# Patient Record
Sex: MALE | Race: BLACK OR AFRICAN AMERICAN | Employment: FULL TIME | URBAN - METROPOLITAN AREA
[De-identification: names, ages, dates, MRNs, and addresses within clinical notes are randomized per-mention and may not be internally consistent; named-entity substitution may affect disease eponyms.]

---

## 2017-07-07 ENCOUNTER — OFFICE VISIT (OUTPATIENT)
Dept: ORTHOPEDIC SURGERY | Age: 52
End: 2017-07-07

## 2017-07-07 VITALS
HEIGHT: 72 IN | SYSTOLIC BLOOD PRESSURE: 144 MMHG | RESPIRATION RATE: 18 BRPM | DIASTOLIC BLOOD PRESSURE: 91 MMHG | WEIGHT: 263 LBS | HEART RATE: 67 BPM | BODY MASS INDEX: 35.62 KG/M2

## 2017-07-07 DIAGNOSIS — G60.9 IDIOPATHIC PERIPHERAL NEUROPATHY: ICD-10-CM

## 2017-07-07 DIAGNOSIS — M54.14 THORACIC AND LUMBOSACRAL NEURITIS: ICD-10-CM

## 2017-07-07 DIAGNOSIS — M54.17 THORACIC AND LUMBOSACRAL NEURITIS: ICD-10-CM

## 2017-07-07 DIAGNOSIS — M51.24 DISPLACEMENT OF THORACIC INTERVERTEBRAL DISC WITHOUT MYELOPATHY: ICD-10-CM

## 2017-07-07 DIAGNOSIS — M96.1 LUMBAR POSTLAMINECTOMY SYNDROME: Primary | ICD-10-CM

## 2017-07-07 DIAGNOSIS — M54.17 LUMBOSACRAL RADICULITIS: ICD-10-CM

## 2017-07-07 RX ORDER — GLUCOSAMINE SULFATE 1500 MG
POWDER IN PACKET (EA) ORAL DAILY
COMMUNITY

## 2017-07-07 RX ORDER — FERROUS SULFATE 15 MG/ML
15 DROPS ORAL DAILY
COMMUNITY

## 2017-07-07 RX ORDER — DIAZEPAM 10 MG/1
TABLET ORAL
Qty: 1 TAB | Refills: 0 | OUTPATIENT
Start: 2017-07-07 | End: 2018-04-02

## 2017-07-07 NOTE — PROGRESS NOTES
[facility]  [address]  [address], [address] [place]   Phone: [phone]  Fax: [phone]        PROGRESS NOTE      HISTORY OF PRESENT ILLNESS:  The patient is a 46 y.o. male whom I last saw on 12/10/14 for f/u chronic low back pain into the BLE with onset of thoracic pain extending into the ribs on the right. This is a patient with a diagnosis of lumbar postlaminectomy syndrome having undergone eight lumbar spinal surgeries (most recent one from 2013 - lumbar interbody fusion performed at [facility]). At his last clinical appointment, I increased his Topamax 75 mg to BID. Pt failed to f/u until today. The patient returns today with chronic progressive middle and low back and groin pain and distal BLE pain (L>R) extending from the knees to the feet, worsening x 6-7 months. His symptoms are really more consistent with neuropathy, which he has been diagnosed with. He rates pain 8-10/10. He is currently followed by the [place] for pain management and is prescribed Oxycodone and Tramadol. Pt has continued to take Topamax 75 mg BID. He failed NEURONTIN and CYMBALTA in the past. Pt is an insulin-dependent diabetic with blood sugars consistently remaninig below 200 (A1C 6.5%). Lumbar spine MRI dated 4/21/17 reviewed. Per report, slight interval progression of degenerative disc disease at T11-T12. No definite appreciable change in lumbar spine findings. At L1-L2, facet joints are unremarkable. No spinal canal or neuroforaminal narrowing. No disc contour abnormality. At L2-L3, facet joints are unremarkable. Spinal canal appears grossly patent. tehre is a question of new disc material and/or osteophyte formation extending into the neuroforamen bilaterally. Blooming artifact on postcontrast images diminishes evaluation for granulation tissue. At L3-L4, facet joints are unremarkable. No spinal canal or neuroforaminal narrowing. No disc contour abnormality.  At L4-L5, no appreciable spinal canal or neural foraminal narrowing. Granulation tissue again noted circumferentially around the thecal sac with more prominent granulation tissue again noted along the left paracentral region. hypertrophic changes at the posterior elements again noted. Overall, no gross appreciable change. At L5-S1, hypertrophic changes the posterior elements again noted. Probable L5 pars defects again noted. Unchanged endplate osteophyte formation. Circumferential granulation tissue again noted, without gross appreciable change. blooming artifact limits evaluation of the neural foramen. Spinal canal is grossly unchanged. Thoracic spine MRI dated 6/13/17 reviewed. Per report, interval worsening of the large paracentral and foraminal disc protrusion on the right at T11-12. There is increase in the degree of spinal canal stenosis with secondary mass effect on the distal aspect of the conus at this level. Moderate spinal canal stenosis is present and there is severe right neural foraminal stenosis on the exiting T11 nerve root. Persistent and stable degenerative disc changes throughout the remainder of the thoracic spine without evidence of increased spinal canal or neural foraminal impingement.  reviewed. Past Medical History:   Diagnosis Date    Chronic pain     Diabetes ([facility] [address]     Hypertension     Unspecified sleep apnea         Social History     Social History    Marital status:      Spouse name: N/A    Number of children: N/A    Years of education: N/A     Occupational History    Not on file. Social History Main Topics    Smoking status: Never Smoker    Smokeless tobacco: Never Used    Alcohol use No    Drug use: No    Sexual activity: Not on file     Other Topics Concern    Not on file     Social History Narrative       Current Outpatient Prescriptions   Medication Sig Dispense Refill    cholecalciferol (VITAMIN D3) 1,000 unit cap Take  by mouth daily.       ferrous sulfate 15 mg iron, 75 mg,/mL (DONNA-IN-SOL) 15 mg iron (75 mg)/mL drop drops Take 15 mg by mouth daily.  metFORMIN (GLUCOPHAGE) 1,000 mg tablet Take 1,000 mg by mouth two (2) times daily (with meals).  insulin glargine (LANTUS) 100 unit/mL injection 50 Units by SubCUTAneous route nightly.  losartan (COZAAR) 100 mg tablet Take 100 mg by mouth daily.  atorvastatin (LIPITOR) 10 mg tablet Take 10 mg by mouth daily.  amLODIPine (NORVASC) 10 mg tablet Take 10 mg by mouth daily.  hydrochlorothiazide (HYDRODIURIL) 25 mg tablet Take 25 mg by mouth daily.  topiramate (TOPAMAX) 25 mg tablet Take 75 mg by mouth two (2) times a day.  potassium chloride SA (MICRO-K) 10 mEq capsule Take 10 mEq by mouth daily.  oxyCODONE-acetaminophen (PERCOCET) 5-325 mg per tablet Take 2 tablets by mouth every four (4) hours as needed for Pain. No Known Allergies       PHYSICAL EXAMINATION    Visit Vitals    BP (!) 144/91    Pulse 67    Resp 18    Ht 6' (1.829 m)    Wt 263 lb (119.3 kg)    BMI 35.67 kg/m2       CONSTITUTIONAL: NAD, A&O x 3  SENSATION: Decreased sensation to light touch in a stocking distribution of the BLE from the knees distally. Sensation to light touch otherwise intact. RANGE OF MOTION: The patient has full passive range of motion in all four extremities. MOTOR:  Straight Leg Raise: Negative, bilateral               Hip Flex Knee Ext Knee Flex Ankle DF GTE Ankle PF Tone   Right +4/5 +4/5 +4/5 +4/5 +4/5 +4/5 +4/5   Left +4/5 +4/5 +4/5 +4/5 +4/5 +4/5 +4/5       ASSESSMENT   Alyson Truong was seen today for back pain, leg pain and foot pain.     Diagnoses and all orders for this visit:    Lumbar postlaminectomy syndrome  -     SCHEDULE SURGERY    Displacement of thoracic intervertebral disc without myelopathy  -     SCHEDULE SURGERY    Thoracic and lumbosacral neuritis  -     SCHEDULE SURGERY    Lumbosacral radiculitis  -     SCHEDULE SURGERY    Idiopathic peripheral neuropathy  - SCHEDULE SURGERY          IMPRESSION AND PLAN:  The patient presents for chronic progressive thoracic and lumbar spine pain with distal BLE pain and paraesthesias. His symptoms appear multifactorial in nature, with some related to thoracic spinal pathology, some to lumbar spinal pathology and some to neuropathy. There is surgical pathology on his thoracic spine MRI. Patient is uninterested in surgical intervention for his thoracic spine. I have discussed the options of a spinal cord stimulator implantation concerning his lumbar spine. I will provide him with an informative spinal cord stimulator DVD for patient to review. Patient elects to proceed with lumbar blocks. I will order L1/2 epidural. As for his distal BLE symptoms, which is most consistent with peripheral neuropathy, I offered to try him on Lyrica however, he wishes to continue on Topamax 75 mg BID at this time. I will see the patient back following block. Written by Margarito Pineda, as dictated by Concepción Ge MD  I examined the patient, reviewed and agree with the note.

## 2018-04-02 ENCOUNTER — HOSPITAL ENCOUNTER (EMERGENCY)
Age: 53
Discharge: HOME | End: 2018-04-02
Attending: EMERGENCY MEDICINE
Payer: COMMERCIAL

## 2018-04-02 VITALS
TEMPERATURE: 98.6 F | RESPIRATION RATE: 18 BRPM | DIASTOLIC BLOOD PRESSURE: 85 MMHG | HEART RATE: 83 BPM | SYSTOLIC BLOOD PRESSURE: 137 MMHG | OXYGEN SATURATION: 100 %

## 2018-04-02 DIAGNOSIS — M54.17 LUMBOSACRAL RADICULITIS: Primary | ICD-10-CM

## 2018-04-02 LAB — GLUCOSE BLD STRIP.AUTO-MCNC: 114 MG/DL (ref 70–110)

## 2018-04-02 PROCEDURE — 74011250636 HC RX REV CODE- 250/636: Performed by: EMERGENCY MEDICINE

## 2018-04-02 PROCEDURE — 99282 EMERGENCY DEPT VISIT SF MDM: CPT

## 2018-04-02 PROCEDURE — 96372 THER/PROPH/DIAG INJ SC/IM: CPT

## 2018-04-02 PROCEDURE — 74011250637 HC RX REV CODE- 250/637: Performed by: EMERGENCY MEDICINE

## 2018-04-02 PROCEDURE — 82962 GLUCOSE BLOOD TEST: CPT

## 2018-04-02 RX ORDER — METHYLPREDNISOLONE 4 MG/1
TABLET ORAL
Qty: 1 DOSE PACK | Refills: 0 | Status: SHIPPED | OUTPATIENT
Start: 2018-04-02 | End: 2018-07-19 | Stop reason: ALTCHOICE

## 2018-04-02 RX ORDER — TRAMADOL HYDROCHLORIDE 50 MG/1
50 TABLET ORAL
Qty: 12 TAB | Refills: 0 | Status: SHIPPED | OUTPATIENT
Start: 2018-04-02

## 2018-04-02 RX ORDER — TRAMADOL HYDROCHLORIDE 50 MG/1
50 TABLET ORAL
Status: DISCONTINUED | OUTPATIENT
Start: 2018-04-02 | End: 2018-04-02 | Stop reason: HOSPADM

## 2018-04-02 RX ORDER — KETOROLAC TROMETHAMINE 30 MG/ML
60 INJECTION, SOLUTION INTRAMUSCULAR; INTRAVENOUS
Status: COMPLETED | OUTPATIENT
Start: 2018-04-02 | End: 2018-04-02

## 2018-04-02 RX ADMIN — TRAMADOL HYDROCHLORIDE 50 MG: 50 TABLET, FILM COATED ORAL at 07:52

## 2018-04-02 RX ADMIN — KETOROLAC TROMETHAMINE 60 MG: 30 INJECTION, SOLUTION INTRAMUSCULAR at 07:52

## 2018-04-02 NOTE — ED PROVIDER NOTES
EMERGENCY DEPARTMENT HISTORY AND PHYSICAL EXAM    7:32 AM      Date: 4/2/2018  Patient Name: Matt Burnett    History of Presenting Illness     Chief Complaint   Patient presents with    Back Pain         History Provided By: Patient    Chief Complaint: Back pain   Duration: 1 Weeks  Timing:  acute on chronic  Location: Lower back  Quality: radiating numbness and tingling  Severity: Moderate  Modifying Factors: worse when walking  Associated Symptoms: B/L leg numbness      Additional History (Context): Matt Burnett is a 46 y.o. male with diabetes, hypertension and chronic back pain with multiple spinal fusions who presents with lower back pain x1 wk. Pain radiates to feet with numbness and tingling and associated muscle spasms. Pt denies leg weakness, incontinence, new trauma. Pt did not check his sugar this morning PTA. Pt does not have a pain contract and only uses Tramadol which he fills at the [facility]. No etOH or tobacco use. PCP: Hattie Rojas MD    Current Facility-Administered Medications   Medication Dose Route Frequency Provider Last Rate Last Dose    traMADol (ULTRAM) tablet 50 mg  50 mg Oral Q6H PRN Marcelina Chiu MD   50 mg at 04/02/18 3530     Current Outpatient Prescriptions   Medication Sig Dispense Refill    methylPREDNISolone (MEDROL, TIFFANY,) 4 mg tablet Per dose pack instructions 1 Dose Pack 0    traMADol (ULTRAM) 50 mg tablet Take 1 Tab by mouth every six (6) hours as needed for Pain. Max Daily Amount: 200 mg. 12 Tab 0    cholecalciferol (VITAMIN D3) 1,000 unit cap Take  by mouth daily.  ferrous sulfate 15 mg iron, 75 mg,/mL (DONNA-IN-SOL) 15 mg iron (75 mg)/mL drop drops Take 15 mg by mouth daily.  potassium chloride SA (MICRO-K) 10 mEq capsule Take 10 mEq by mouth daily.  metFORMIN (GLUCOPHAGE) 1,000 mg tablet Take 1,000 mg by mouth two (2) times daily (with meals).  insulin glargine (LANTUS) 100 unit/mL injection 50 Units by SubCUTAneous route nightly.       losartan (COZAAR) 100 mg tablet Take 100 mg by mouth daily.  atorvastatin (LIPITOR) 10 mg tablet Take 10 mg by mouth daily.  amLODIPine (NORVASC) 10 mg tablet Take 10 mg by mouth daily.  hydrochlorothiazide (HYDRODIURIL) 25 mg tablet Take 25 mg by mouth daily.  topiramate (TOPAMAX) 25 mg tablet Take 75 mg by mouth two (2) times a day. Past History     Past Medical History:  Past Medical History:   Diagnosis Date    Chronic pain     Diabetes ([address]     Hypertension     Unspecified sleep apnea        Past Surgical History:  Past Surgical History:   Procedure Laterality Date    NEUROLOGICAL PROCEDURE UNLISTED      spinal fusion       Family History:  Family History   Problem Relation Age of Onset    No Known Problems Mother     Diabetes Father     Hypertension Father        Social History:  Social History   Substance Use Topics    Smoking status: Never Smoker    Smokeless tobacco: Never Used    Alcohol use No       Allergies:  No Known Allergies      Review of Systems       Review of Systems   Gastrointestinal: Negative for nausea and vomiting. Genitourinary: Negative for dysuria and flank pain. Denies incontinence   Musculoskeletal: Positive for back pain. Neurological: Positive for numbness (B/L legs). Negative for weakness. All other systems reviewed and are negative. Physical Exam     Visit Vitals    /85    Pulse 83    Temp 98.6 °F (37 °C)    Resp 18    SpO2 100%         Physical Exam   Constitutional: He is oriented to person, place, and time. He appears well-developed and well-nourished. No distress. HENT:   Head: Normocephalic and atraumatic. Mouth/Throat: Oropharynx is clear and moist.   Eyes: Conjunctivae and EOM are normal. Pupils are equal, round, and reactive to light. No scleral icterus. Neck: Normal range of motion. Neck supple. Cardiovascular: Normal rate, regular rhythm and normal heart sounds.     No murmur heard.  Pulmonary/Chest: Effort normal and breath sounds normal. No respiratory distress. Abdominal: Soft. Bowel sounds are normal. He exhibits no distension. There is no tenderness. Musculoskeletal: He exhibits no edema. Tender L4-L5   Mild paraspinal tenderness   Lymphadenopathy:     He has no cervical adenopathy. Neurological: He is alert and oriented to person, place, and time. He has normal strength. Coordination normal.   Chronic decreased sensation in B/L LE  Negative straight leg raise   Skin: Skin is warm and dry. No rash noted. Multiple old well-healed surgical scars   Psychiatric: He has a normal mood and affect. His behavior is normal.   Nursing note and vitals reviewed. Diagnostic Study Results     Labs -  Recent Results (from the past 12 hour(s))   GLUCOSE, POC    Collection Time: 04/02/18  7:45 AM   Result Value Ref Range    Glucose (POC) 114 (H) 70 - 110 mg/dL       Radiologic Studies -   MRI LUMB SPINE WO CONT    (Results Pending)         Medical Decision Making   I am the first provider for this patient. I reviewed the vital signs, available nursing notes, past medical history, past surgical history, family history and social history. Vital Signs-Reviewed the patient's vital signs. Pulse Oximetry Analysis -  100% on room air (Interpretation) nonhypoxic     Cardiac Monitor:  Rate: 83      Records Reviewed: Nursing Notes (Time of Review: 7:32 AM)    ED Course: Progress Notes, Reevaluation, and Consults:        Provider Notes (Medical Decision Making):   MDM  Number of Diagnoses or Management Options  Lumbosacral radiculitis:   Diagnosis management comments: H/o chronic lower back pain with increased lower back pain radiating to R leg denies new weakness or numbness straight leg raise neg in Ed will tx and outpt referral and MRI        Amount and/or Complexity of Data Reviewed  Clinical lab tests: ordered and reviewed              Diagnosis     Clinical Impression:   1. Lumbosacral radiculitis        Disposition: Discharge     Follow-up Information     Follow up With Details Comments Contact Info    [facility] EMERGENCY DEPT  As needed, If symptoms worsen [address] [facility]    Trang Bernal MD Schedule an appointment as soon as possible for a visit for ED follow up appointment  Chirag Collier [address]  [facility] [address] [place]      Etienne Andrade MD Schedule an appointment as soon as possible for a visit  ambrosio   [address] [address]  [address], [place] Schedule an appointment as soon as possible for a visit  [address] [facility] [address] [address]  [phone]             Discharge Medication List as of 4/2/2018  7:50 AM      START taking these medications    Details   methylPREDNISolone (MEDROL, TIFFANY,) 4 mg tablet Per dose pack instructions, Print, Disp-1 Dose Pack, R-0      traMADol (ULTRAM) 50 mg tablet Take 1 Tab by mouth every six (6) hours as needed for Pain. Max Daily Amount: 200 mg., Print, Disp-12 Tab, R-0         CONTINUE these medications which have NOT CHANGED    Details   cholecalciferol (VITAMIN D3) 1,000 unit cap Take  by mouth daily. , Historical Med      ferrous sulfate 15 mg iron, 75 mg,/mL (DONNA-IN-SOL) 15 mg iron (75 mg)/mL drop drops Take 15 mg by mouth daily. , Historical Med      potassium chloride SA (MICRO-K) 10 mEq capsule Take 10 mEq by mouth daily. , Historical Med      metFORMIN (GLUCOPHAGE) 1,000 mg tablet Take 1,000 mg by mouth two (2) times daily (with meals). , Historical Med      insulin glargine (LANTUS) 100 unit/mL injection 50 Units by SubCUTAneous route nightly., Historical Med      losartan (COZAAR) 100 mg tablet Take 100 mg by mouth daily. , Historical Med      atorvastatin (LIPITOR) 10 mg tablet Take 10 mg by mouth daily. , Historical Med      amLODIPine (NORVASC) 10 mg tablet Take 10 mg by mouth daily. , Historical Med      hydrochlorothiazide (HYDRODIURIL) 25 mg tablet Take 25 mg by mouth daily. , Historical Med      topiramate (TOPAMAX) 25 mg tablet Take 75 mg by mouth two (2) times a day., Historical Med           _______________________________    Attestations:  Scribe Attestation     Lizabeth Mclaughlin acting as a scribe for and in the presence of Za Bright MD      April 02, 2018 at 7:58 AM       Provider Attestation:      I personally performed the services described in the documentation, reviewed the documentation, as recorded by the scribe in my presence, and it accurately and completely records my words and actions.  April 02, 2018 at 7:58 AM - Za Bright MD    _______________________________

## 2018-04-02 NOTE — DISCHARGE INSTRUCTIONS

## 2018-04-03 ENCOUNTER — OFFICE VISIT (OUTPATIENT)
Dept: SURGERY | Age: 53
End: 2018-04-03

## 2018-04-03 VITALS
RESPIRATION RATE: 20 BRPM | WEIGHT: 256 LBS | HEART RATE: 92 BPM | BODY MASS INDEX: 35.84 KG/M2 | TEMPERATURE: 96.6 F | SYSTOLIC BLOOD PRESSURE: 162 MMHG | DIASTOLIC BLOOD PRESSURE: 92 MMHG | HEIGHT: 71 IN

## 2018-04-03 DIAGNOSIS — E66.01 MORBID OBESITY (HCC): Primary | ICD-10-CM

## 2018-04-03 DIAGNOSIS — Z79.4 CONTROLLED TYPE 2 DIABETES MELLITUS WITHOUT COMPLICATION, WITH LONG-TERM CURRENT USE OF INSULIN (HCC): ICD-10-CM

## 2018-04-03 DIAGNOSIS — I10 ESSENTIAL HYPERTENSION: ICD-10-CM

## 2018-04-03 DIAGNOSIS — E11.9 CONTROLLED TYPE 2 DIABETES MELLITUS WITHOUT COMPLICATION, WITH LONG-TERM CURRENT USE OF INSULIN (HCC): ICD-10-CM

## 2018-04-03 RX ORDER — LEVOTHYROXINE SODIUM 137 UG/1
TABLET ORAL
COMMUNITY

## 2018-04-03 NOTE — PROGRESS NOTES
Initial Consultation for Bariatric Surgery Template (Gastric Bypass)    Jono Bazzi is a 46 y.o. male who comes into the office today for initial consultation for the surgical options for the treatment of morbid obesity. The patient initially identified obesity at the age of 48  and at age 25 weighed 165 lbs. He has tried a variety of unsupervised weight-loss attempts including self imposed, Weight Watchers and family physician, but has yet to meet with lasting success. Maximum weight lost on a diet is about 10  lbs, but that the weight loss always seems to return. He has gained weight since having 10 back surgeries in the last 10 years. Today, the patient is  Height: 5' 11\" (180.3 cm) tall, Weight: 116.1 kg (256 lb) lbs for a Body mass index is 35.7 kg/(m^2). It is due to the patient's severe obesity, which is further complicated by adult onset diabetes mellitus, hypertension, obstructive sleep apnea - CPAP and weight related arthopathies  that the patient is now seeking out bariatric surgery, specifically, gastric bypass. Past Medical History:   Diagnosis Date    Chronic pain     Diabetes ([address]     Hypertension     Unspecified sleep apnea        Past Surgical History:   Procedure Laterality Date    HC KNEE ARTHROSCOPY/SURGERY Left     NEUROLOGICAL PROCEDURE UNLISTED      spinal fusion X10 Lumbar region       Current Outpatient Prescriptions   Medication Sig Dispense Refill    methylPREDNISolone (MEDROL, TIFFANY,) 4 mg tablet Per dose pack instructions 1 Dose Pack 0    traMADol (ULTRAM) 50 mg tablet Take 1 Tab by mouth every six (6) hours as needed for Pain. Max Daily Amount: 200 mg. 12 Tab 0    cholecalciferol (VITAMIN D3) 1,000 unit cap Take  by mouth daily.  ferrous sulfate 15 mg iron, 75 mg,/mL (DONNA-IN-SOL) 15 mg iron (75 mg)/mL drop drops Take 15 mg by mouth daily.  potassium chloride SA (MICRO-K) 10 mEq capsule Take 10 mEq by mouth daily.       metFORMIN (GLUCOPHAGE) 1,000 mg tablet Take 1,000 mg by mouth two (2) times daily (with meals).  insulin glargine (LANTUS) 100 unit/mL injection 50 Units by SubCUTAneous route nightly.  losartan (COZAAR) 100 mg tablet Take 100 mg by mouth daily.  atorvastatin (LIPITOR) 10 mg tablet Take 10 mg by mouth daily.  amLODIPine (NORVASC) 10 mg tablet Take 10 mg by mouth daily.  hydrochlorothiazide (HYDRODIURIL) 25 mg tablet Take 25 mg by mouth daily.          No Known Allergies    Social History   Substance Use Topics    Smoking status: Never Smoker    Smokeless tobacco: Never Used    Alcohol use Yes       Family History   Problem Relation Age of Onset    No Known Problems Mother    [facility] Diabetes Father     Hypertension Father        Family Status   Relation Status    Mother [de-identified]    Father        Review of Systems:  Positive in BOLD    CONST: Fever, weight loss, fatigue or chills  GI: Nausea, vomiting, abdominal pain, change in bowel habits, hematochezia, melena, and GERD   INTEG: Dermatitis, abnormal moles  HEENT: Recent changes in vision, vertigo, epistaxis, dysphagia and hoarseness  CV: Chest pain, palpitations, HTN, edema and varicosities  RESP: Cough, shortness of breath, wheezing, hemoptysis, snoring and reactive airway disease  : Hematuria, dysuria, frequency, urgency, nocturia and stress urinary incontinence   MS: Weakness, joint pain and arthritis  ENDO: Diabetes, thyroid disease, polyuria, polydipsia, polyphagia, poor wound healing, heat intolerance, cold intolerance  LYMPH/HEME: Anemia, bruising and history of blood transfusions  NEURO: Dizziness, headache, fainting, seizures and stroke  PSYCH: Anxiety and depression      Physical Exam    Visit Vitals    BP (!) 162/92 (BP 1 Location: Right arm, BP Patient Position: At rest)    Pulse 92    Temp 96.6 °F (35.9 °C) (Oral)    Resp 20    Ht 5' 11\" (1.803 m)    Wt 116.1 kg (256 lb)    BMI 35.7 kg/m2       Pre op weight: 256  EBW: 96  Wt loss to date: 0 General: 46 y.o.) male in no acute distress. Morbidly obese in abdomen and hips - mixed pattern  HEENT: Normocephalic, atraumatic, Pupils equal and reactive, nasopharynx clear, oropharynx clear and moist without lesions  NECK: Supple, no lymphadenopathy, thyromegaly, carotid bruits or jugular venous distension. trachea midline  RESP: Clear to auscultation bilaterally, no wheezes, rhonchi, or rales, normal respiratory excursion  CV: Regular rate and rhythm, no murmurs, rubs or gallops. 3+/4 pulses in bilateral dorsalis pedis and posterior tibialis. No distal edema or varicosities. ABD: Soft, nontender, nondistended, normoactive bowel sounds, small umbilical hernias, no hepatosplenomegaly, easily palpable costal margins, mixed distribution  Extremities: Warm, well perfused, no tenderness or swelling, normal gait/station  Neuro: Sensation and strength grossly intact and symmetrical  Psych: Alert and oriented to person, place, and time. Impression:    Delfina Winslow is a 46 y.o. male who is suffering from morbid obesity with a BMI of 36  and comorbidities including adult onset diabetes mellitus, hypertension, obstructive sleep apnea - CPAP and weight related arthopathies  who would benefit from bariatric surgery. We have had an extensive discussion with regard to the risks, benefits and likely outcomes of the operation. We've discussed the restrictive and malabsorptive nature of the gastric bypass and compared and contrasted with the sleeve gastrectomy. The patient understands the likelihood of losing approximately 80% of their excess weight in 12 to 18 months. He also understands the risks including but not limited to bleeding, infection, need for reoperation, ulcers, leaks and strictures, bowel obstruction secondary to adhesions and internal hernias, DVT, PE, heart attack, stroke, and death.  Patient also understands risks of inadequate weight loss, excess weight loss, vitamin insufficiency, protein malnutrition, excess skin, and loss of hair. We have reviewed the components of a successful postoperative course including requirement for a high protein, low carbohydrate diet, 60 oz a day of zero calorie liquids, daily vitamin supplementation, daily exercise, regular follow-up, and participation in support groups. At this time we will enroll the patient in our bariatric program, undertake routine laboratory evaluation, chest X-ray, EKG, possible UGI and evaluation by  nutritionist as well as psychologist and pending their satisfactory completion of the preop evaluation, plan to perform a gastric bypass.

## 2018-04-03 NOTE — MR AVS SNAPSHOT
[facility] 
 
 
 [address] [address] 
[phone] Patient: Lonny Dear MRN: [id number] :1965 Visit Information Date & Time Provider Department Dept. Phone Encounter #  
 4/3/2018  9:30 AM MD [facility] [phone] [id number] Your Appointments 2018  3:00 PM  
NUTRITION COUNSELING with [facility] DIETICIAN [address] ([facility]) Appt Note: 1 of 3 [facility]  
 [address] 222 [facility]  
  
   
 [address] [facility] [address] [place] [address] 951 2018  3:00 PM  
NUTRITION COUNSELING with [facility] DIETICIAN [address] ([facility]) Appt Note: 2 of 3 [facility] [address] [address]  
[phone] 2018 10:00 AM  
Nurse Visit with [address] [place] [address] [address] [facility] ([facility]) Appt Note: orientation [facility] ([facility]) [phone]  
  
    
 2018  2:45 PM  
Follow Up with MD Pa[facility]) Appt Note: [address] [facility] [address] [facility]  
  
    
 2018  3:00 PM  
NUTRITION COUNSELING with [facility] DIETICIAN [address] ([facility]) Appt Note: 3 of 3  
 [address] [address]  
[phone] Upcoming Health Maintenance Date Due Hepatitis C Screening 1965 DTaP/Tdap/Td series (1 - Tdap) 1986 FOBT Q 1 YEAR AGE 50-75 2015 Influenza Age 5 to Adult 2017 MEDICARE YEARLY EXAM 3/28/2018 Allergies as of 4/3/2018  Review Complete On: 4/3/2018 By: Kenan Setting No Known Allergies Current Immunizations  Never Reviewed No immunizations on file. Not reviewed this visit You Were Diagnosed With   
  
 Codes Comments Morbid obesity ([facility]    -  Primary ICD-10-CM: E66.01 
ICD-9-CM: 278.01   
 BMI 35.0-35.9,adult     ICD-10-CM: R38.17 ICD-9-CM: V85.35 Essential hypertension     ICD-10-CM: I10 
ICD-9-CM: 401.9 Controlled type 2 diabetes mellitus without complication, with long-term current use of insulin (HCC)     ICD-10-CM: E11.9, Z79.4 ICD-9-CM: 250.00, V58.67 Vitals BP Pulse Temp Resp Height(growth percentile) Weight(growth percentile) (!) 162/92 (BP 1 Location: Right arm, BP Patient Position: At rest) 92 96.6 °F (35.9 °C) (Oral) 20 5' 11\" (1.803 m) 256 lb (116.1 kg) BMI Smoking Status 35.7 kg/m2 Never Smoker Vitals History BMI and BSA Data Body Mass Index Body Surface Area 35.7 kg/m 2 2.41 m 2 Preferred Pharmacy Pharmacy Name Phone Kerline Dick [address]  [facility] [phone] Your Updated Medication List  
  
   
This list is accurate as of 4/3/18 11:16 AM.  Always use your most recent med list. amLODIPine 10 mg tablet Commonly known as:  Erenest John Take 10 mg by mouth daily. atorvastatin 10 mg tablet Commonly known as:  LIPITOR Take 10 mg by mouth daily. ferrous sulfate 15 mg iron (75 mg)/mL 15 mg iron (75 mg)/mL Drop drops Commonly known as:  DONNA-IN-SOL Take 15 mg by mouth daily. hydroCHLOROthiazide 25 mg tablet Commonly known as:  HYDRODIURIL Take 25 mg by mouth daily. insulin glargine 100 unit/mL injection Commonly known as:  LANTUS  
50 Units by SubCUTAneous route nightly. losartan 100 mg tablet Commonly known as:  COZAAR Take 100 mg by mouth daily. metFORMIN 1,000 mg tablet Commonly known as:  GLUCOPHAGE Take 1,000 mg by mouth two (2) times daily (with meals). methylPREDNISolone 4 mg tablet Commonly known as:  MEDROL (TIFFANY) Per dose pack instructions  
  
 potassium chloride SA 10 mEq capsule Commonly known as:  Chilhowie Kennedi Take 10 mEq by mouth daily. SYNTHROID 137 mcg tablet Generic drug:  levothyroxine Take  by mouth Daily (before breakfast). traMADol 50 mg tablet Commonly known as:  ULTRAM  
Take 1 Tab by mouth every six (6) hours as needed for Pain. Max Daily Amount: 200 mg. VITAMIN D3 1,000 unit Cap Generic drug:  cholecalciferol Take  by mouth daily. To-Do List   
 04/03/2018 Lab:  ALBUMIN   
  
 04/03/2018 Lab:  CBC WITH AUTOMATED DIFF   
  
 04/03/2018 ECG:  EKG, 12 LEAD, INITIAL   
  
 04/03/2018 Lab:  H. PYLORI BREATH TEST   
  
 04/03/2018 Lab:  IRON   
  
 04/03/2018 Lab:  METABOLIC PANEL, BASIC   
  
 04/03/2018 Lab:  TSH 3RD GENERATION   
  
 04/03/2018 Lab:  VITAMIN B1, WHOLE BLOOD   
  
 04/03/2018 Lab:  VITAMIN B12 & FOLATE   
  
 04/03/2018 Lab:  VITAMIN D, 25 HYDROXY Patient Instructions If you have any questions or concerns about today's appointment, the verbal and/or written instructions you were given for follow up care, please call our office at [phone]. [facility] - [address] 
 
[phone] office [phone] Introducing [facility]! Ariana Hernandez introduces [employer] patient portal. Now you can access parts of your medical record, email your doctor's office, and request medication refills online. 1. In your internet browser, go to [email or web address] 2. Click on the First Time User? Click Here link in the Sign In box. You will see the New Member Sign Up page. 3. Enter your [employer] Access Code exactly as it appears below.  You will not need to use this code after youve completed the sign-up process. If you do not sign up before the expiration date, you must request a new code. · [employer] Access Code: [email or web address] Expires: 7/1/2018  7:29 AM 
 
4. Enter the last four digits of your Social Security Number (xxxx) and Date of Birth (mm/dd/yyyy) as indicated and click Submit. You will be taken to the next sign-up page. 5. Create a [employer] ID. This will be your [employer] login ID and cannot be changed, so think of one that is secure and easy to remember. 6. Create a [employer] password. You can change your password at any time. 7. Enter your Password Reset Question and Answer. This can be used at a later time if you forget your password. 8. Enter your e-mail address. You will receive e-mail notification when new information is available in [address] 9. Click Sign Up. You can now view and download portions of your medical record. 10. Click the Download Summary menu link to download a portable copy of your medical information. If you have questions, please visit the Frequently Asked Questions section of the [employer] website. Remember, [employer] is NOT to be used for urgent needs. For medical emergencies, dial 911. Now available from your iPhone and Android! Please provide this summary of care documentation to your next provider. Your primary care clinician is listed as Irasema Pickard. If you have any questions after today's visit, please call [phone].

## 2018-04-03 NOTE — PROGRESS NOTES
Morgan Pino is a 46 y.o. male who presents today with   Chief Complaint   Patient presents with    Morbid Obesity     consult        Body mass index is 35.7 kg/(m^2). 1. Have you been to the ER, urgent care clinic since your last visit? Hospitalized since your last visit? No    2. Have you seen or consulted any other health care providers outside of the [address] [place] [address] since your last visit? Include any pap smears or colon screening.  No

## 2018-04-03 NOTE — COMMUNICATION BODY
Initial Consultation for Bariatric Surgery Template (Gastric Bypass)    Marcelo Dhaliwal is a 46 y.o. male who comes into the office today for initial consultation for the surgical options for the treatment of morbid obesity. The patient initially identified obesity at the age of 48  and at age 25 weighed 165 lbs. He has tried a variety of unsupervised weight-loss attempts including self imposed, Weight Watchers and family physician, but has yet to meet with lasting success. Maximum weight lost on a diet is about 10  lbs, but that the weight loss always seems to return. He has gained weight since having 10 back surgeries in the last 10 years. Today, the patient is  Height: 5' 11\" (180.3 cm) tall, Weight: 116.1 kg (256 lb) lbs for a Body mass index is 35.7 kg/(m^2). It is due to the patient's severe obesity, which is further complicated by adult onset diabetes mellitus, hypertension, obstructive sleep apnea - CPAP and weight related arthopathies  that the patient is now seeking out bariatric surgery, specifically, gastric bypass. Past Medical History:   Diagnosis Date    Chronic pain     Diabetes ([address]     Hypertension     Unspecified sleep apnea        Past Surgical History:   Procedure Laterality Date    HC KNEE ARTHROSCOPY/SURGERY Left     NEUROLOGICAL PROCEDURE UNLISTED      spinal fusion X10 Lumbar region       Current Outpatient Prescriptions   Medication Sig Dispense Refill    methylPREDNISolone (MEDROL, TIFFANY,) 4 mg tablet Per dose pack instructions 1 Dose Pack 0    traMADol (ULTRAM) 50 mg tablet Take 1 Tab by mouth every six (6) hours as needed for Pain. Max Daily Amount: 200 mg. 12 Tab 0    cholecalciferol (VITAMIN D3) 1,000 unit cap Take  by mouth daily.  ferrous sulfate 15 mg iron, 75 mg,/mL (DONNA-IN-SOL) 15 mg iron (75 mg)/mL drop drops Take 15 mg by mouth daily.  potassium chloride SA (MICRO-K) 10 mEq capsule Take 10 mEq by mouth daily.       metFORMIN (GLUCOPHAGE) 1,000 mg tablet Take 1,000 mg by mouth two (2) times daily (with meals).  insulin glargine (LANTUS) 100 unit/mL injection 50 Units by SubCUTAneous route nightly.  losartan (COZAAR) 100 mg tablet Take 100 mg by mouth daily.  atorvastatin (LIPITOR) 10 mg tablet Take 10 mg by mouth daily.  amLODIPine (NORVASC) 10 mg tablet Take 10 mg by mouth daily.  hydrochlorothiazide (HYDRODIURIL) 25 mg tablet Take 25 mg by mouth daily.          No Known Allergies    Social History   Substance Use Topics    Smoking status: Never Smoker    Smokeless tobacco: Never Used    Alcohol use Yes       Family History   Problem Relation Age of Onset    No Known Problems Mother    [facility] Diabetes Father     Hypertension Father        Family Status   Relation Status    Mother [de-identified]    Father        Review of Systems:  Positive in BOLD    CONST: Fever, weight loss, fatigue or chills  GI: Nausea, vomiting, abdominal pain, change in bowel habits, hematochezia, melena, and GERD   INTEG: Dermatitis, abnormal moles  HEENT: Recent changes in vision, vertigo, epistaxis, dysphagia and hoarseness  CV: Chest pain, palpitations, HTN, edema and varicosities  RESP: Cough, shortness of breath, wheezing, hemoptysis, snoring and reactive airway disease  : Hematuria, dysuria, frequency, urgency, nocturia and stress urinary incontinence   MS: Weakness, joint pain and arthritis  ENDO: Diabetes, thyroid disease, polyuria, polydipsia, polyphagia, poor wound healing, heat intolerance, cold intolerance  LYMPH/HEME: Anemia, bruising and history of blood transfusions  NEURO: Dizziness, headache, fainting, seizures and stroke  PSYCH: Anxiety and depression      Physical Exam    Visit Vitals    BP (!) 162/92 (BP 1 Location: Right arm, BP Patient Position: At rest)    Pulse 92    Temp 96.6 °F (35.9 °C) (Oral)    Resp 20    Ht 5' 11\" (1.803 m)    Wt 116.1 kg (256 lb)    BMI 35.7 kg/m2       Pre op weight: 256  EBW: 96  Wt loss to date: 0 General: 46 y.o.) male in no acute distress. Morbidly obese in abdomen and hips - mixed pattern  HEENT: Normocephalic, atraumatic, Pupils equal and reactive, nasopharynx clear, oropharynx clear and moist without lesions  NECK: Supple, no lymphadenopathy, thyromegaly, carotid bruits or jugular venous distension. trachea midline  RESP: Clear to auscultation bilaterally, no wheezes, rhonchi, or rales, normal respiratory excursion  CV: Regular rate and rhythm, no murmurs, rubs or gallops. 3+/4 pulses in bilateral dorsalis pedis and posterior tibialis. No distal edema or varicosities. ABD: Soft, nontender, nondistended, normoactive bowel sounds, small umbilical hernias, no hepatosplenomegaly, easily palpable costal margins, mixed distribution  Extremities: Warm, well perfused, no tenderness or swelling, normal gait/station  Neuro: Sensation and strength grossly intact and symmetrical  Psych: Alert and oriented to person, place, and time. Impression:    Tommie Curran is a 46 y.o. male who is suffering from morbid obesity with a BMI of 36  and comorbidities including adult onset diabetes mellitus, hypertension, obstructive sleep apnea - CPAP and weight related arthopathies  who would benefit from bariatric surgery. We have had an extensive discussion with regard to the risks, benefits and likely outcomes of the operation. We've discussed the restrictive and malabsorptive nature of the gastric bypass and compared and contrasted with the sleeve gastrectomy. The patient understands the likelihood of losing approximately 80% of their excess weight in 12 to 18 months. He also understands the risks including but not limited to bleeding, infection, need for reoperation, ulcers, leaks and strictures, bowel obstruction secondary to adhesions and internal hernias, DVT, PE, heart attack, stroke, and death.  Patient also understands risks of inadequate weight loss, excess weight loss, vitamin insufficiency, protein malnutrition, excess skin, and loss of hair. We have reviewed the components of a successful postoperative course including requirement for a high protein, low carbohydrate diet, 60 oz a day of zero calorie liquids, daily vitamin supplementation, daily exercise, regular follow-up, and participation in support groups. At this time we will enroll the patient in our bariatric program, undertake routine laboratory evaluation, chest X-ray, EKG, possible UGI and evaluation by  nutritionist as well as psychologist and pending their satisfactory completion of the preop evaluation, plan to perform a gastric bypass.

## 2018-04-03 NOTE — LETTER
4/3/2018 10:34 AM 
 
Patient:  Nisreen Mitchell YOB: 1965 Date of Visit: 4/3/2018 Erin Reed MD 
[address] [facility] [address] [facility] [address] [address] VIA Facsimile: [phone] Dear Erin Reed MD, Thank you for referring Mr. Basilia Macias to [address] [address] for evaluation and treatment. Below are the relevant portions of my assessment and plan of care. Initial Consultation for Bariatric Surgery Template (Gastric Bypass) Nisreen Mitchell is a 46 y.o. male who comes into the office today for initial consultation for the surgical options for the treatment of morbid obesity. The patient initially identified obesity at the age of 48  and at age 25 weighed 165 lbs. He has tried a variety of unsupervised weight-loss attempts including self imposed, Weight Watchers and family physician, but has yet to meet with lasting success. Maximum weight lost on a diet is about 10  lbs, but that the weight loss always seems to return. He has gained weight since having 10 back surgeries in the last 10 years. Today, the patient is  Height: 5' 11\" (180.3 cm) tall, Weight: 116.1 kg (256 lb) lbs for a Body mass index is 35.7 kg/(m^2). It is due to the patient's severe obesity, which is further complicated by adult onset diabetes mellitus, hypertension, obstructive sleep apnea - CPAP and weight related arthopathies  that the patient is now seeking out bariatric surgery, specifically, gastric bypass. Past Medical History:  
Diagnosis Date  Chronic pain  Diabetes ([address]  Hypertension  Unspecified sleep apnea Past Surgical History:  
Procedure Laterality Date  HC KNEE ARTHROSCOPY/SURGERY Left  NEUROLOGICAL PROCEDURE UNLISTED    
 spinal fusion X10 Lumbar region Current Outpatient Prescriptions Medication Sig Dispense Refill  methylPREDNISolone (MEDROL, TIFFANY,) 4 mg tablet Per dose pack instructions 1 Dose Pack 0  
  traMADol (ULTRAM) 50 mg tablet Take 1 Tab by mouth every six (6) hours as needed for Pain. Max Daily Amount: 200 mg. 12 Tab 0  cholecalciferol (VITAMIN D3) 1,000 unit cap Take  by mouth daily.  ferrous sulfate 15 mg iron, 75 mg,/mL (DONNA-IN-SOL) 15 mg iron (75 mg)/mL drop drops Take 15 mg by mouth daily.  potassium chloride SA (MICRO-K) 10 mEq capsule Take 10 mEq by mouth daily.  metFORMIN (GLUCOPHAGE) 1,000 mg tablet Take 1,000 mg by mouth two (2) times daily (with meals).  insulin glargine (LANTUS) 100 unit/mL injection 50 Units by SubCUTAneous route nightly.  losartan (COZAAR) 100 mg tablet Take 100 mg by mouth daily.  atorvastatin (LIPITOR) 10 mg tablet Take 10 mg by mouth daily.  amLODIPine (NORVASC) 10 mg tablet Take 10 mg by mouth daily.  hydrochlorothiazide (HYDRODIURIL) 25 mg tablet Take 25 mg by mouth daily. No Known Allergies Social History Substance Use Topics  Smoking status: Never Smoker  Smokeless tobacco: Never Used  Alcohol use Yes Family History Problem Relation Age of Onset  No Known Problems Mother  Diabetes Father  Hypertension Father Family Status Relation Status  Mother Alive  Father  Review of Systems:  Positive in BOLD 
 
CONST: Fever, weight loss, fatigue or chills GI: Nausea, vomiting, abdominal pain, change in bowel habits, hematochezia, melena, and GERD INTEG: Dermatitis, abnormal moles HEENT: Recent changes in vision, vertigo, epistaxis, dysphagia and hoarseness CV: Chest pain, palpitations, HTN, edema and varicosities RESP: Cough, shortness of breath, wheezing, hemoptysis, snoring and reactive airway disease : Hematuria, dysuria, frequency, urgency, nocturia and stress urinary incontinence MS: Weakness, joint pain and arthritis ENDO: Diabetes, thyroid disease, polyuria, polydipsia, polyphagia, poor wound healing, heat intolerance, cold intolerance LYMPH/HEME: Anemia, bruising and history of blood transfusions NEURO: Dizziness, headache, fainting, seizures and stroke PSYCH: Anxiety and depression Physical Exam 
 
Visit Vitals  BP (!) 162/92 (BP 1 Location: Right arm, BP Patient Position: At rest)  Pulse 92  Temp 96.6 °F (35.9 °C) (Oral)  Resp 20  
 Ht 5' 11\" (1.803 m)  Wt 116.1 kg (256 lb)  BMI 35.7 kg/m2 Pre op weight: 256 EBW: 96 Wt loss to date: 0 General: 46 y.o.) male in no acute distress. Morbidly obese in abdomen and hips - mixed pattern HEENT: Normocephalic, atraumatic, Pupils equal and reactive, nasopharynx clear, oropharynx clear and moist without lesions NECK: Supple, no lymphadenopathy, thyromegaly, carotid bruits or jugular venous distension. trachea midline RESP: Clear to auscultation bilaterally, no wheezes, rhonchi, or rales, normal respiratory excursion CV: Regular rate and rhythm, no murmurs, rubs or gallops. 3+/4 pulses in bilateral dorsalis pedis and posterior tibialis. No distal edema or varicosities. ABD: Soft, nontender, nondistended, normoactive bowel sounds, small umbilical hernias, no hepatosplenomegaly, easily palpable costal margins, mixed distribution Extremities: Warm, well perfused, no tenderness or swelling, normal gait/station Neuro: Sensation and strength grossly intact and symmetrical 
Psych: Alert and oriented to person, place, and time. Impression: Morgan Pino is a 46 y.o. male who is suffering from morbid obesity with a BMI of 36  and comorbidities including adult onset diabetes mellitus, hypertension, obstructive sleep apnea - CPAP and weight related arthopathies  who would benefit from bariatric surgery. We have had an extensive discussion with regard to the risks, benefits and likely outcomes of the operation.  We've discussed the restrictive and malabsorptive nature of the gastric bypass and compared and contrasted with the sleeve gastrectomy. The patient understands the likelihood of losing approximately 80% of their excess weight in 12 to 18 months. He also understands the risks including but not limited to bleeding, infection, need for reoperation, ulcers, leaks and strictures, bowel obstruction secondary to adhesions and internal hernias, DVT, PE, heart attack, stroke, and death. Patient also understands risks of inadequate weight loss, excess weight loss, vitamin insufficiency, protein malnutrition, excess skin, and loss of hair. We have reviewed the components of a successful postoperative course including requirement for a high protein, low carbohydrate diet, 60 oz a day of zero calorie liquids, daily vitamin supplementation, daily exercise, regular follow-up, and participation in support groups. At this time we will enroll the patient in our bariatric program, undertake routine laboratory evaluation, chest X-ray, EKG, possible UGI and evaluation by  nutritionist as well as psychologist and pending their satisfactory completion of the preop evaluation, plan to perform a gastric bypass. Thank you very much for your referral of Mr. Vincent Hernandez. If you have questions, please do not hesitate to call me. I look forward to following Mr. Yannick Jackson along with you and will keep you updated as to his progress.   
 
 
 
 
Sincerely, 
 
 
Efren Braun MD

## 2018-04-03 NOTE — PATIENT INSTRUCTIONS
If you have any questions or concerns about today's appointment, the verbal and/or written instructions you were given for follow up care, please call our office at [phone].     [facility] - [address]    [phone] office  [phone]

## 2018-04-05 LAB
UREA BREATH TEST QL: NEGATIVE
UREA BREATH TEST QL: NORMAL

## 2018-04-17 ENCOUNTER — DOCUMENTATION ONLY (OUTPATIENT)
Dept: SURGERY | Age: 53
End: 2018-04-17

## 2018-04-17 NOTE — PROGRESS NOTES
[employer] [facility]  [address] [facility], [facility]    Patient's Name: Duane Fredrickson                                  Age: 46 y.o. YOB: 1965                                          Sex: male  Date: 04/11/2018  Insurance: [facility]                          Session: 1 of 3               Surgeon:  Dr. Nallely Allan    Height: 5'11\"                    Weight: 256#           Starting weight with surgeon: 256#          Do you smoke?: no    Alcohol Intake:     I drink occasionally:x  Number of drinks at a time:  2  Number of times a week: Patient did not answer. Class Guidelines    Pt. Understand that if they miss a month, depending on insurance, they may have to start over. Pt. Also understand that the expectation is to lose  Or maintain weight. Weight gain may result in delaying surgery until the weight is off. EATING HABITS AND BEHAVIORS:  Pt. Struggles With:  Liquid calories:   Skipping breakfast:   Eating foods with a high amount of carbohydrates:   Eating High fat foods:   Eating large portions:   Making poor snack choices:x  Eating out frequently: x  Skipping meals:   Reading labels: x  Drinking >48 oz fluids daily: x  Getting sufficient physical activity/exercise: x  Night time eating/snacking:   Binge eating:   Eating often, even when not hungry (grazing):   Giving into peer pressure regarding eating/drinking: x  Other:     Each class we spend time discussing the pre-op diet, diet progression and vitamin/mineral requirements as well as the Key Diet Principles. Each class we also cover lowering carbohydrate consumption. Keeping carbohydrates <25 grams per meal and avoiding added sugars is emphasized. Patient is educated on the effects that  carbohydrates and bad fats have on them.       PHYSICAL ACTIVITY/EXERCISE:   Patient's activity is increasing because patient plans to or is:  Walking more: x  Other aerobic activity such as running, swimming, Isidro, kickboxing ect. : x  Chair exercises: x  Active in resistance training such as weight lifting: x  Other:     Each class we spend time discussing the importance of increasing activity. Patient can start with 10 minutes of walking daily or chair exercises and build from there. BEHAVIOR MODIFICATION:  Making modifications to behavior, patient plans to or is:  Eating only when hungry not to treat stress, anger, tiredness or boredom:   Eating away from TV, computer and phone: x  Eating on a small plate: x  Eats slowly, chewing food well: x  Other: We spend time in each class discussing the importance of breaking bad habits and how to do this. I encourage pt.s to self evaluate and look for those crucial moments in which they are making these poor choices. I recommend a food journal which can help identify when/where//why/who we are with when we compromise and make exceptions that are poor choices. ADDITIONAL INFORMATION:  Specific changes patient made since the last class:  Cut back on fruit juice and sweet tea.     Devorah Guadarrama RD  04/11/2018

## 2018-04-20 ENCOUNTER — HOSPITAL ENCOUNTER (OUTPATIENT)
Dept: LAB | Age: 53
Discharge: HOME | End: 2018-04-20
Payer: COMMERCIAL

## 2018-04-20 ENCOUNTER — HOSPITAL ENCOUNTER (OUTPATIENT)
Dept: PREADMISSION TESTING | Age: 53
Discharge: HOME | End: 2018-04-20
Payer: COMMERCIAL

## 2018-04-20 DIAGNOSIS — Z79.4 CONTROLLED TYPE 2 DIABETES MELLITUS WITHOUT COMPLICATION, WITH LONG-TERM CURRENT USE OF INSULIN (HCC): ICD-10-CM

## 2018-04-20 DIAGNOSIS — I10 ESSENTIAL HYPERTENSION: ICD-10-CM

## 2018-04-20 DIAGNOSIS — E66.01 MORBID OBESITY (HCC): ICD-10-CM

## 2018-04-20 DIAGNOSIS — E11.9 CONTROLLED TYPE 2 DIABETES MELLITUS WITHOUT COMPLICATION, WITH LONG-TERM CURRENT USE OF INSULIN (HCC): ICD-10-CM

## 2018-04-20 LAB
25(OH)D3 SERPL-MCNC: 27.5 NG/ML (ref 30–100)
ALBUMIN SERPL-MCNC: 4 G/DL (ref 3.4–5)
ANION GAP SERPL CALC-SCNC: 5 MMOL/L (ref 3–18)
ATRIAL RATE: 69 BPM
BASOPHILS # BLD: 0 K/UL (ref 0–0.06)
BASOPHILS NFR BLD: 0 % (ref 0–2)
BUN SERPL-MCNC: 12 MG/DL (ref 7–18)
BUN/CREAT SERPL: 9 (ref 12–20)
CALCIUM SERPL-MCNC: 9.1 MG/DL (ref 8.5–10.1)
CALCULATED P AXIS, ECG09: 65 DEGREES
CALCULATED R AXIS, ECG10: -11 DEGREES
CALCULATED T AXIS, ECG11: 92 DEGREES
CHLORIDE SERPL-SCNC: 106 MMOL/L (ref 100–108)
CO2 SERPL-SCNC: 33 MMOL/L (ref 21–32)
CREAT SERPL-MCNC: 1.28 MG/DL (ref 0.6–1.3)
DIAGNOSIS, 93000: NORMAL
DIFFERENTIAL METHOD BLD: ABNORMAL
EOSINOPHIL # BLD: 0.1 K/UL (ref 0–0.4)
EOSINOPHIL NFR BLD: 2 % (ref 0–5)
ERYTHROCYTE [DISTWIDTH] IN BLOOD BY AUTOMATED COUNT: 13.3 % (ref 11.6–14.5)
FOLATE SERPL-MCNC: 16.9 NG/ML (ref 3.1–17.5)
GLUCOSE SERPL-MCNC: 89 MG/DL (ref 74–99)
HCT VFR BLD AUTO: 38.9 % (ref 36–48)
HGB BLD-MCNC: 12.7 G/DL (ref 13–16)
IRON SERPL-MCNC: 81 UG/DL (ref 50–175)
LYMPHOCYTES # BLD: 2.7 K/UL (ref 0.9–3.6)
LYMPHOCYTES NFR BLD: 38 % (ref 21–52)
MCH RBC QN AUTO: 30.2 PG (ref 24–34)
MCHC RBC AUTO-ENTMCNC: 32.6 G/DL (ref 31–37)
MCV RBC AUTO: 92.6 FL (ref 74–97)
MONOCYTES # BLD: 0.6 K/UL (ref 0.05–1.2)
MONOCYTES NFR BLD: 8 % (ref 3–10)
NEUTS SEG # BLD: 3.7 K/UL (ref 1.8–8)
NEUTS SEG NFR BLD: 52 % (ref 40–73)
P-R INTERVAL, ECG05: 166 MS
PLATELET # BLD AUTO: 264 K/UL (ref 135–420)
PMV BLD AUTO: 12.1 FL (ref 9.2–11.8)
POTASSIUM SERPL-SCNC: 3.7 MMOL/L (ref 3.5–5.5)
Q-T INTERVAL, ECG07: 384 MS
QRS DURATION, ECG06: 106 MS
QTC CALCULATION (BEZET), ECG08: 411 MS
RBC # BLD AUTO: 4.2 M/UL (ref 4.7–5.5)
SODIUM SERPL-SCNC: 144 MMOL/L (ref 136–145)
TSH SERPL DL<=0.05 MIU/L-ACNC: 0.83 UIU/ML (ref 0.36–3.74)
VENTRICULAR RATE, ECG03: 69 BPM
VIT B12 SERPL-MCNC: 445 PG/ML (ref 211–911)
WBC # BLD AUTO: 7.1 K/UL (ref 4.6–13.2)

## 2018-04-20 PROCEDURE — 84443 ASSAY THYROID STIM HORMONE: CPT | Performed by: SURGERY

## 2018-04-20 PROCEDURE — 82306 VITAMIN D 25 HYDROXY: CPT | Performed by: SURGERY

## 2018-04-20 PROCEDURE — 82040 ASSAY OF SERUM ALBUMIN: CPT | Performed by: SURGERY

## 2018-04-20 PROCEDURE — 83540 ASSAY OF IRON: CPT | Performed by: SURGERY

## 2018-04-20 PROCEDURE — 82607 VITAMIN B-12: CPT | Performed by: SURGERY

## 2018-04-20 PROCEDURE — 85025 COMPLETE CBC W/AUTO DIFF WBC: CPT | Performed by: SURGERY

## 2018-04-20 PROCEDURE — 93005 ELECTROCARDIOGRAM TRACING: CPT

## 2018-04-20 PROCEDURE — 84425 ASSAY OF VITAMIN B-1: CPT | Performed by: SURGERY

## 2018-04-20 PROCEDURE — 36415 COLL VENOUS BLD VENIPUNCTURE: CPT | Performed by: SURGERY

## 2018-04-20 PROCEDURE — 80048 BASIC METABOLIC PNL TOTAL CA: CPT | Performed by: SURGERY

## 2018-04-23 LAB — VIT B1 BLD-SCNC: 386.3 NMOL/L (ref 66.5–200)

## 2018-05-03 ENCOUNTER — TELEPHONE (OUTPATIENT)
Dept: SURGERY | Age: 53
End: 2018-05-03

## 2018-05-03 NOTE — TELEPHONE ENCOUNTER
----- Message from Lori Leiva MD sent at 5/3/2018 11:55 AM EDT -----  Needs a CV clearance for this EKG

## 2018-05-03 NOTE — TELEPHONE ENCOUNTER
Called patient to inform him that Dr. Judith Oglesby would like for him to have a cardiac clearance. Made appt for patient with cardiovascular specialist for May 31 at 01:45pm. Per patient request I have e-mail the information to the patient. Patient confirm he rcvd the  Information.

## 2018-05-22 ENCOUNTER — DOCUMENTATION ONLY (OUTPATIENT)
Dept: SURGERY | Age: 53
End: 2018-05-22

## 2018-05-22 NOTE — PROGRESS NOTES
[facility]  [address] [facility], [facility]    Patient's Name: Justice Noe                                  Age: 46 y.o. YOB: 1965                                          Sex: male  Date: 05/16/2018  Insurance: [facility]                          Session: 2 of 3               Surgeon:  Dr. Steph Mendieta    Height: 5'11\"                    Weight: 249#           Starting weight with surgeon: 256#          Do you smoke?: no    Alcohol Intake:     I drink occasionally:x  Number of drinks at a time:  1  Number of times a week: 1 maybe    Class Guidelines    Pt. Understand that if they miss a month, depending on insurance, they may have to start over. Pt. Also understand that the expectation is to lose  Or maintain weight. Weight gain may result in delaying surgery until the weight is off. EATING HABITS AND BEHAVIORS:  Pt. Struggles With:  Liquid calories:   Skipping breakfast:   Eating foods with a high amount of carbohydrates:   Eating High fat foods:   Eating large portions:   Making poor snack choices:  Eating out frequently: x  Skipping meals: x  Reading labels: x  Drinking >48 oz fluids daily: x  Getting sufficient physical activity/exercise: x  Night time eating/snacking:   Binge eating:   Eating often, even when not hungry (grazing):   Giving into peer pressure regarding eating/drinking:   Other:     Each class we spend time discussing the pre-op diet, diet progression and vitamin/mineral requirements as well as the Key Diet Principles. Each class we also cover lowering carbohydrate consumption. Keeping carbohydrates <25 grams per meal and avoiding added sugars is emphasized. Patient is educated on the effects that  carbohydrates and bad fats have on them. PHYSICAL ACTIVITY/EXERCISE:   Patient's activity is increasing because patient plans to or is:  Walking more:    Other aerobic activity such as running, swimming, Isidro, kickboxing ect.:   Chair exercises: Active in resistance training such as weight lifting:   Other:     Each class we spend time discussing the importance of increasing activity. Patient can start with 10 minutes of walking daily or chair exercises and build from there. BEHAVIOR MODIFICATION:  Making modifications to behavior, patient plans to or is:  Eating only when hungry not to treat stress, anger, tiredness or boredom:   Eating away from TV, computer and phone: x  Eating on a small plate: x  Eats slowly, chewing food well: x  Other: We spend time in each class discussing the importance of breaking bad habits and how to do this. I encourage pt.s to self evaluate and look for those crucial moments in which they are making these poor choices. I recommend a food journal which can help identify when/where//why/who we are with when we compromise and make exceptions that are poor choices. ADDITIONAL INFORMATION:  Specific changes patient made since the last class:  No eating after 7, no soda, no candy. RD's concerns/opinion's:  Patient is making a few lifestyle changes.     Martín Fisher RD  05/16/2018

## 2018-05-23 ENCOUNTER — OFFICE VISIT (OUTPATIENT)
Dept: CARDIOLOGY CLINIC | Age: 53
End: 2018-05-23

## 2018-05-23 VITALS
SYSTOLIC BLOOD PRESSURE: 169 MMHG | OXYGEN SATURATION: 96 % | HEART RATE: 84 BPM | WEIGHT: 250 LBS | DIASTOLIC BLOOD PRESSURE: 103 MMHG | BODY MASS INDEX: 35 KG/M2 | HEIGHT: 71 IN

## 2018-05-23 DIAGNOSIS — R94.31 ABNORMAL EKG: ICD-10-CM

## 2018-05-23 DIAGNOSIS — Z01.818 PRE-OP TESTING: ICD-10-CM

## 2018-05-23 DIAGNOSIS — R06.00 DYSPNEA, UNSPECIFIED TYPE: Primary | ICD-10-CM

## 2018-05-23 NOTE — PROGRESS NOTES
[facility]    Mr. Christos Mancia is a 46year old male with a history of diabetes, hypertension, obesity, obstructive sleep apnea, and chronic back pain. Mr. Christos Mancia is here today for initial evaluation prior to considering gastric bypass surgery for cardiac surgery. Mr. Christos Mancia denies any prior history of myocardial infarction or congestive heart failure. He does have diabetes, hypertension, hyperlipidemia and sleep apnea. He uses a CPAP machine regularly. Mr. Christos Mancia has a significant chronic back pain and had multiple spinal fusion surgeries in the past.  He said his functional status is limited because of significant back pain. He says he cannot walk more than one block for the last ten years because of his significant spasm in the back after walking and discomfort. He does not have any chest pain or chest tightness or shortness of breath with limited amount of walking he can perform. He uses 1-2 pillows at night. He denies any PND or lower extremity swelling, however he is concerned his functional status is limited because of his back pain. Denies any nausea, vomiting, abdominal pain, fever, chills, sputum production. No hematuria or other bleeding complaints    Past Medical History:   Diagnosis Date    Chronic back pain     Diabetes ([facility] [address]     Hypertension     Unspecified sleep apnea     Use CPAP         Past Surgical History:   Procedure Laterality Date    HC KNEE ARTHROSCOPY/SURGERY Left     NEUROLOGICAL PROCEDURE UNLISTED      spinal fusion X10 Lumbar region       Current Outpatient Prescriptions   Medication Sig    levothyroxine (SYNTHROID) 137 mcg tablet Take  by mouth Daily (before breakfast).  methylPREDNISolone (MEDROL, TIFFANY,) 4 mg tablet Per dose pack instructions    traMADol (ULTRAM) 50 mg tablet Take 1 Tab by mouth every six (6) hours as needed for Pain. Max Daily Amount: 200 mg.     cholecalciferol (VITAMIN D3) 1,000 unit cap Take  by mouth daily.  ferrous sulfate 15 mg iron, 75 mg,/mL (DONNA-IN-SOL) 15 mg iron (75 mg)/mL drop drops Take 15 mg by mouth daily.  potassium chloride SA (MICRO-K) 10 mEq capsule Take 10 mEq by mouth daily.  metFORMIN (GLUCOPHAGE) 1,000 mg tablet Take 1,000 mg by mouth two (2) times daily (with meals).  insulin glargine (LANTUS) 100 unit/mL injection 50 Units by SubCUTAneous route nightly.  losartan (COZAAR) 100 mg tablet Take 100 mg by mouth daily.  atorvastatin (LIPITOR) 10 mg tablet Take 10 mg by mouth daily.  amLODIPine (NORVASC) 10 mg tablet Take 10 mg by mouth daily.  hydrochlorothiazide (HYDRODIURIL) 25 mg tablet Take 25 mg by mouth daily. No current facility-administered medications for this visit. Allergies and Sensitivities:  No Known Allergies    Family History:  Family History   Problem Relation Age of Onset    No Known Problems Mother    Varun.Alma Diabetes Father     Hypertension Father        Social History:  Social History   Substance Use Topics    Smoking status: Never Smoker    Smokeless tobacco: Never Used    Alcohol use Yes     He  reports that he has never smoked. He has never used smokeless tobacco.  He  reports that he drinks alcohol. Review of Systems:  Cardiac symptoms as noted above in HPI. All others negative. Denies fatigue, malaise, skin rash, joint pain, blurring vision, photophobia, neck pain, hemoptysis, chronic cough, nausea, vomiting, hematuria, burning micturition, BRBPR, chronic headaches. Physical Exam:  BP Readings from Last 3 Encounters:   05/23/18 (!) 169/103   04/03/18 (!) 162/92   04/02/18 137/85         Pulse Readings from Last 3 Encounters:   05/23/18 84   04/03/18 92   04/02/18 83          Wt Readings from Last 3 Encounters:   05/23/18 250 lb (113.4 kg)   04/03/18 256 lb (116.1 kg)   07/07/17 263 lb (119.3 kg)       Constitutional: Oriented to person, place, and time. HENT: Head: Normocephalic and atraumatic.  Eyes: Conjunctivae and extraocular motions are normal.   Neck: No JVD present. Carotid bruit is not appreciated. Cardiovascular: Regular rhythm. No murmur, gallop or rubs appreciated  Lung: Breath sounds normal. No respiratory distress. No ronchi or rales appreciated  Abdominal: No tenderness. No rebound and no guarding. Musculoskeletal: There is no lower extremity edema. No cynosis  Lymphadenopathy:  No cervical or supraclavicular adenopathy appriciated. Neurological: No gross motor deficit noted. Skin: No visible skin rash noted. No Ear discharge noted  Psychiatric: Normal mood and affect. Good distal pulse    Review of Data  LABS:   Lab Results   Component Value Date/Time    Sodium 144 04/20/2018 09:41 AM    Potassium 3.7 04/20/2018 09:41 AM    Chloride 106 04/20/2018 09:41 AM    CO2 33 (H) 04/20/2018 09:41 AM    Glucose 89 04/20/2018 09:41 AM    BUN 12 04/20/2018 09:41 AM    Creatinine 1.28 04/20/2018 09:41 AM     No flowsheet data found. No results found for: GPT, ALT  No results found for: HBA1C, HGBE8, EZS8ITFY, UZV6JDPB    EKG  (04/18) Sinus rhythm at 69 bpm. T wave abnormality in anterolateral leads. ECHO    STRESS TEST (EST, PHARM, NUC, ECHO etc)    IMPRESSION & PLAN:  Mr. Aguilar Dunn is a 46year old male with a history of diabetes, hypertension, sleep apnea and chronic back pain. Diabetes:  Mr. Aguilar Dunn has diabetes. Goal HGB A1c less than 7 is recommended from a cardiovascular standpoint. Currently he is on Metformin and insulin. I would recommend to continue same. Defer this management to PCP. Hypertension:  His blood pressure today is 169/100 mmHg. He is on Losartan and Amlodipine and Hydrochlorothiazide, however this morning he has not taken his medication yet. Usually his blood pressure stays less than 150 according to patient. I recommend to continue same.   Echocardiogram will be ordered to rule out hypertensive cardiovascular heart disease because of his uncontrolled hypertension and also because of abnormal EKG. Abnormal EKG:  Mr. Hayde Gonzalez had an EKG for preop workup. EKG showed sinus rhythm. Possible electrical criteria to suggest left ventricular hypertrophy. There was T wave inversion in anterolateral lead, which could be from hypertrophy or underlying silent ischemia because of diabetes. Nuclear stress test and echocardiogram will be ordered before the elective surgery. His functional status is limited because of chronic back pain. Preop evaluation:  Mr. Hayde Gonzalez is considering gastric bypass surgery. Currently he has no cardiac symptoms, however he is functionally limited because of his severe debilitating back pain. His EKG is abnormal as mentioned above. Because of his risk factors and abnormal EKG, I am going to proceed with nuclear stress test for an elective procedure. An echocardiogram has also been ordered because of hypertension and abnormal EKG. He was advised to take aspirin 81 mg OTC. Sleep apnea:  He uses his CPAP machine regularly. I will defer this management to PCP. Obesity:  Mr. Hayde Gonzalez weighs 250 lbs with multiple other comorbidities. His BMI is 34. He is being seen by Dr. Ranjan Soni for gastric bypass surgery. Preop workup has been ordered as mentioned above. Importance of diet was discussed with patient. This plan was discussed with patient who is in agreement. Thank you for allowing me to participate in patient care. Please feel free to call me if you have any question or concern. Deb Winslow MD  Please note: This document has been produced using voice recognition software. Unrecognized errors in transcription may be present.

## 2018-05-23 NOTE — MR AVS SNAPSHOT
[address] [facility] [address] [id number] 
[phone] Patient: Morgan Pino MRN: [id number] :1965 Visit Information Date & Time Provider Department Dept. Phone Encounter #  
 2018  8:00 AM Morgan Dang MD [address] [place] [address] Specialist at [facility] [phone] [id number] Your Appointments 2018  2:45 PM  
Follow Up with MD Ariana Clements [facility] [address]) Appt Note: [address], [address] [facility], [address] [facility]  
  
    
 2018  3:00 PM  
NUTRITION COUNSELING with [facility] DIETICIAN [address] [place] ([address]) Appt Note: 3 of 3  
 [address] [place] [address] [address] [place] [address] 2018  8:30 AM  
Follow Up with Morgan Dang MD  
Cardio Specialist at [facility]) Appt Note: after echo and nuc  
 [facility] [phone] Upcoming Health Maintenance Date Due Hepatitis C Screening 1965 HEMOGLOBIN A1C Q6M 1965 LIPID PANEL Q1 1965 FOOT EXAM Q1 1975 MICROALBUMIN Q1 1975 EYE EXAM RETINAL OR DILATED Q1 1975 Pneumococcal 19-64 Medium Risk (1 of 1 - PPSV23) 1984 DTaP/Tdap/Td series (1 - Tdap) 1986 FOBT Q 1 YEAR AGE 50-75 2015 MEDICARE YEARLY EXAM 3/28/2018 Influenza Age 5 to Adult 2018 Allergies as of 2018  Review Complete On: 2018 By: Karime Feliciano No Known Allergies Current Immunizations  Never Reviewed No immunizations on file. Not reviewed this visit You Were Diagnosed With   
  
 Codes Comments Dyspnea, unspecified type    -  Primary ICD-10-CM: R06.00 
ICD-9-CM: 786.09 Abnormal EKG     ICD-10-CM: R94.31 
ICD-9-CM: 794.31 Pre-op testing     ICD-10-CM: P50.715 ICD-9-CM: V72.84 Vitals BP Pulse Height(growth percentile) Weight(growth percentile) SpO2 BMI  
 (!) 169/103 84 5' 11\" (1.803 m) 250 lb (113.4 kg) 96% 34.87 kg/m2 Smoking Status Never Smoker BMI and BSA Data Body Mass Index Body Surface Area 34.87 kg/m 2 2.38 m 2 Preferred Pharmacy Pharmacy Name Phone [facility] [address]  [facility] [phone] Your Updated Medication List  
  
   
This list is accurate as of 5/23/18  8:29 AM.  Always use your most recent med list. amLODIPine 10 mg tablet Commonly known as:  Skip Newcomer Take 10 mg by mouth daily. atorvastatin 10 mg tablet Commonly known as:  LIPITOR Take 10 mg by mouth daily. ferrous sulfate 15 mg iron (75 mg)/mL 15 mg iron (75 mg)/mL Drop drops Commonly known as:  DONNA-IN-SOL Take 15 mg by mouth daily. hydroCHLOROthiazide 25 mg tablet Commonly known as:  HYDRODIURIL Take 25 mg by mouth daily. insulin glargine 100 unit/mL injection Commonly known as:  LANTUS  
50 Units by SubCUTAneous route nightly. losartan 100 mg tablet Commonly known as:  COZAAR Take 100 mg by mouth daily. metFORMIN 1,000 mg tablet Commonly known as:  GLUCOPHAGE Take 1,000 mg by mouth two (2) times daily (with meals). methylPREDNISolone 4 mg tablet Commonly known as:  MEDROL (TIFFANY) Per dose pack instructions  
  
 potassium chloride SA 10 mEq capsule Commonly known as:  Wade [place]ar Take 10 mEq by mouth daily. SYNTHROID 137 mcg tablet Generic drug:  levothyroxine Take  by mouth Daily (before breakfast). traMADol 50 mg tablet Commonly known as:  Keith Palin  
 Take 1 Tab by mouth every six (6) hours as needed for Pain. Max Daily Amount: 200 mg. VITAMIN D3 1,000 unit Cap Generic drug:  cholecalciferol Take  by mouth daily. Patient Instructions Luis Miguel will call to schedule echo and stress test  
 
 
  
Introducing [facility]! Mimi Larson introduces [employer] patient portal. Now you can access parts of your medical record, email your doctor's office, and request medication refills online. 1. In your internet browser, go to [email or web address]. [employer] 2. Click on the First Time User? Click Here link in the Sign In box. You will see the New Member Sign Up page. 3. Enter your [facility] [employer] Access Code exactly as it appears below. You will not need to use this code after youve completed the sign-up process. If you do not sign up before the expiration date, you must request a new code. · [facility] [employer] Access Code: [email or web address] Expires: 7/1/2018  7:29 AM 
 
4. Enter the last four digits of your Social Security Number (xxxx) and Date of Birth (mm/dd/yyyy) as indicated and click Submit. You will be taken to the next sign-up page. 5. Create a [facility] [employer] ID. This will be your [facility] [employer] login ID and cannot be changed, so think of one that is secure and easy to remember. 6. Create a [facility] [employer] password. You can change your password at any time. 7. Enter your Password Reset Question and Answer. This can be used at a later time if you forget your password. 8. Enter your e-mail address. You will receive e-mail notification when new information is available in [address] 9. Click Sign Up. You can now view and download portions of your medical record. 10. Click the Download Summary menu link to download a portable copy of your medical information. If you have questions, please visit the Frequently Asked Questions section of the [employer] website. Remember, [facility] [employer] is NOT to be used for urgent needs. For medical emergencies, dial 911. Now available from your iPhone and Android! Please provide this summary of care documentation to your next provider. Your primary care clinician is listed as Ana Chavez. If you have any questions after today's visit, please call [phone].

## 2018-06-07 ENCOUNTER — TELEPHONE (OUTPATIENT)
Dept: SURGERY | Age: 53
End: 2018-06-07

## 2018-06-07 NOTE — TELEPHONE ENCOUNTER
Patient called he received phone call to either move his appointment or r/s due to him needing cardiac clearance work up.  Patient wants to still the doctor on 6/8/18 so that he can let the doctor know what is going on with him and if there is anything else he needs to do because he is almost done with requirements in the bariatric program.

## 2018-06-13 ENCOUNTER — DOCUMENTATION ONLY (OUTPATIENT)
Dept: SURGERY | Age: 53
End: 2018-06-13

## 2018-06-13 NOTE — PROGRESS NOTES
[facility]  [address] [facility], [address]    Patient's Name: Ashlee Garcia                                  Age: 46 y.o. YOB: 1965                                          Sex: male  Date: 06/13/2018  Insurance: [employer]                          Session: 3 of 3               Surgeon:  Dr. Violeta Gee       Height: 5'11\"                    Weight: 252#           Starting weight with surgeon: 256#          Do you smoke?: no    Alcohol Intake:     I drink occasionally:x  Number of drinks at a time:  2  Number of times a week: 1    Class Guidelines    Pt. Understand that if they miss a month, depending on insurance, they may have to start over. Pt. Also understand that the expectation is to lose  Or maintain weight. Weight gain may result in delaying surgery until the weight is off. EATING HABITS AND BEHAVIORS:  Pt. Struggles With:  Liquid calories:   Skipping breakfast:   Eating foods with a high amount of carbohydrates:   Eating High fat foods:   Eating large portions:   Making poor snack choices:x  Eating out frequently: x  Skipping meals:   Reading labels: x  Drinking >48 oz fluids daily: x  Getting sufficient physical activity/exercise: x  Night time eating/snacking:   Binge eating:   Eating often, even when not hungry (grazing):   Giving into peer pressure regarding eating/drinking:   Other:     Each class we spend time discussing the pre-op diet, diet progression and vitamin/mineral requirements as well as the Key Diet Principles. Each class we also cover lowering carbohydrate consumption. Keeping carbohydrates <25 grams per meal and avoiding added sugars is emphasized. Patient is educated on the effects that  carbohydrates and bad fats have on them. PHYSICAL ACTIVITY/EXERCISE:   Patient's activity is increasing because patient plans to or is:  Walking more: x  Other aerobic activity such as running, swimming, Isidro, kickboxing ect. : x  Chair exercises: x  Active in resistance training such as weight lifting: x  Other:     Each class we spend time discussing the importance of increasing activity. Patient can start with 10 minutes of walking daily or chair exercises and build from there. BEHAVIOR MODIFICATION:  Making modifications to behavior, patient plans to or is:  Eating only when hungry not to treat stress, anger, tiredness or boredom:   Eating away from TV, computer and phone:   Eating on a small plate: x  Eats slowly, chewing food well: x  Other: We spend time in each class discussing the importance of breaking bad habits and how to do this. I encourage pt.s to self evaluate and look for those crucial moments in which they are making these poor choices. I recommend a food journal which can help identify when/where//why/who we are with when we compromise and make exceptions that are poor choices. ADDITIONAL INFORMATION:  Specific changes patient made since the last class:  Drink more water. RD's concerns/opinion's:  Patient has completed a 3 month WLT. He has needed to maintain weight but has been an active participant in the class. He has been educated on the pre-op, post-op diet as well as the vitamin/mineral/protein supplements. He is cleared to proceed from a nutritional perspective.     Melissa Ayaal, DELORES  06/13/2018

## 2018-07-02 ENCOUNTER — APPOINTMENT (OUTPATIENT)
Dept: NUCLEAR MEDICINE | Age: 53
End: 2018-07-02
Attending: INTERNAL MEDICINE
Payer: COMMERCIAL

## 2018-07-02 ENCOUNTER — APPOINTMENT (OUTPATIENT)
Dept: NON INVASIVE DIAGNOSTICS | Age: 53
End: 2018-07-02
Attending: INTERNAL MEDICINE
Payer: COMMERCIAL

## 2018-07-02 ENCOUNTER — HOSPITAL ENCOUNTER (OUTPATIENT)
Dept: NON INVASIVE DIAGNOSTICS | Age: 53
Discharge: HOME | End: 2018-07-02
Attending: INTERNAL MEDICINE
Payer: COMMERCIAL

## 2018-07-02 ENCOUNTER — HOSPITAL ENCOUNTER (OUTPATIENT)
Dept: NUCLEAR MEDICINE | Age: 53
Discharge: HOME | End: 2018-07-02
Attending: INTERNAL MEDICINE
Payer: COMMERCIAL

## 2018-07-02 VITALS
SYSTOLIC BLOOD PRESSURE: 138 MMHG | HEIGHT: 71 IN | DIASTOLIC BLOOD PRESSURE: 79 MMHG | WEIGHT: 250 LBS | BODY MASS INDEX: 35 KG/M2

## 2018-07-02 DIAGNOSIS — R94.31 ABNORMAL EKG: ICD-10-CM

## 2018-07-02 DIAGNOSIS — R06.00 DYSPNEA, UNSPECIFIED TYPE: ICD-10-CM

## 2018-07-02 DIAGNOSIS — Z01.818 PRE-OP TESTING: ICD-10-CM

## 2018-07-02 LAB
ECHO AV PEAK GRADIENT: 0 MMHG
ECHO AV PEAK VELOCITY: 0 CM/S
ECHO AV REGURGITANT PHT: 502.4 CM
ECHO LA VOL 2C: 84.24 ML (ref 18–58)
ECHO LA VOL 4C: 48.97 ML (ref 18–58)
ECHO LA VOLUME INDEX A2C: 36.33 ML/M2
ECHO LA VOLUME INDEX A4C: 21.12 ML/M2
ECHO LV EDV A2C: 155.7 ML
ECHO LV EDV A4C: 177.1 ML
ECHO LV EDV BP: 170.8 ML (ref 67–155)
ECHO LV EDV INDEX A4C: 76.4 ML/M2
ECHO LV EDV INDEX BP: 73.7 ML/M2
ECHO LV EDV NDEX A2C: 67.2 ML/M2
ECHO LV EJECTION FRACTION A2C: 67 %
ECHO LV EJECTION FRACTION A4C: 42 %
ECHO LV EJECTION FRACTION BIPLANE: 57 % (ref 55–100)
ECHO LV ESV A2C: 50.8 ML
ECHO LV ESV A4C: 103.6 ML
ECHO LV ESV BP: 73.4 ML (ref 22–58)
ECHO LV ESV INDEX A2C: 21.9 ML/M2
ECHO LV ESV INDEX A4C: 44.7 ML/M2
ECHO LV ESV INDEX BP: 31.7 ML/M2
ECHO LV INTERNAL DIMENSION DIASTOLIC: 5.14 CM (ref 4.2–5.9)
ECHO LV INTERNAL DIMENSION SYSTOLIC: 3.76 CM
ECHO LV ISOVOLUMETRIC RELAXATION TIME (IVRT): 55.5 MS
ECHO LV IVSD: 1.24 CM (ref 0.6–1)
ECHO LV MASS 2D: 321.5 G (ref 88–224)
ECHO LV MASS INDEX 2D: 138.7 G/M2
ECHO LV POSTERIOR WALL DIASTOLIC: 1.33 CM (ref 0.6–1)
ECHO LVOT DIAM: 2.33 CM
ECHO LVOT PEAK GRADIENT: 3.5 MMHG
ECHO LVOT PEAK VELOCITY: 94.17 CM/S
ECHO LVOT VTI: 16.13 CM
ECHO MV A VELOCITY: 84.7 CM/S
ECHO MV AREA PHT: 8.5 CM2
ECHO MV E DECELERATION TIME (DT): 89 MS
ECHO MV E VELOCITY: 0.51 CM/S
ECHO MV E/A RATIO: 0.6
ECHO MV PRESSURE HALF TIME (PHT): 25.8 MS
ECHO PVEIN A DURATION: 72.1 MS
ECHO PVEIN A VELOCITY: 19.38 CM/S
PISA AR MAX VEL: 521.43 CM/S

## 2018-07-02 PROCEDURE — 93306 TTE W/DOPPLER COMPLETE: CPT

## 2018-07-03 ENCOUNTER — HOSPITAL ENCOUNTER (OUTPATIENT)
Dept: NUCLEAR MEDICINE | Age: 53
Discharge: HOME | End: 2018-07-03
Attending: INTERNAL MEDICINE
Payer: COMMERCIAL

## 2018-07-03 ENCOUNTER — HOSPITAL ENCOUNTER (OUTPATIENT)
Dept: NON INVASIVE DIAGNOSTICS | Age: 53
Discharge: HOME | End: 2018-07-03
Attending: INTERNAL MEDICINE
Payer: COMMERCIAL

## 2018-07-03 VITALS
HEIGHT: 71 IN | BODY MASS INDEX: 35 KG/M2 | WEIGHT: 250 LBS | DIASTOLIC BLOOD PRESSURE: 106 MMHG | SYSTOLIC BLOOD PRESSURE: 167 MMHG

## 2018-07-03 LAB
NUC REST EJECTION FRACTION: 49 %
STRESS BASELINE HR: 86 BPM
STRESS ESTIMATED WORKLOAD: 1 METS
STRESS EXERCISE DUR MIN: NORMAL
STRESS PEAK DIAS BP: 104 MMHG
STRESS PEAK SYS BP: 187 MMHG
STRESS POST PEAK HR: 136 BPM
STRESS RATE PRESSURE PRODUCT: NORMAL BPM*MMHG
STRESS TARGET HR: 143 BPM

## 2018-07-03 PROCEDURE — 93017 CV STRESS TEST TRACING ONLY: CPT

## 2018-07-03 PROCEDURE — 74011250636 HC RX REV CODE- 250/636: Performed by: INTERNAL MEDICINE

## 2018-07-03 PROCEDURE — 78452 HT MUSCLE IMAGE SPECT MULT: CPT

## 2018-07-03 RX ADMIN — REGADENOSON 0.4 MG: 0.08 INJECTION, SOLUTION INTRAVENOUS at 09:15

## 2018-07-19 ENCOUNTER — OFFICE VISIT (OUTPATIENT)
Dept: SURGERY | Age: 53
End: 2018-07-19

## 2018-07-19 VITALS
WEIGHT: 253 LBS | RESPIRATION RATE: 20 BRPM | HEIGHT: 71 IN | SYSTOLIC BLOOD PRESSURE: 172 MMHG | DIASTOLIC BLOOD PRESSURE: 104 MMHG | TEMPERATURE: 97.1 F | BODY MASS INDEX: 35.42 KG/M2 | HEART RATE: 90 BPM

## 2018-07-19 DIAGNOSIS — I10 ESSENTIAL HYPERTENSION: ICD-10-CM

## 2018-07-19 DIAGNOSIS — Z79.4 CONTROLLED TYPE 2 DIABETES MELLITUS WITHOUT COMPLICATION, WITH LONG-TERM CURRENT USE OF INSULIN (HCC): ICD-10-CM

## 2018-07-19 DIAGNOSIS — E11.9 CONTROLLED TYPE 2 DIABETES MELLITUS WITHOUT COMPLICATION, WITH LONG-TERM CURRENT USE OF INSULIN (HCC): ICD-10-CM

## 2018-07-19 DIAGNOSIS — E66.01 MORBID OBESITY (HCC): Primary | ICD-10-CM

## 2018-07-19 NOTE — PROGRESS NOTES
Preop History and Physical Exam:    Albert Ron is a 48 y.o. male who comes into the office today after completing the entirety of the bariatric preoperative protocol satisfactorily. he initially identified obesity at the age of 48 and at age 25 weighed 165lbs. he has tried a variety of unsupervised weight-loss attempts, but has yet to meet with lasting success. Today, the patient is Height: 5' 11\" (180.3 cm) tall, Weight: 114.8 kg (253 lb) lbs for a Body mass index is 35.29 kg/(m^2). It is due to their severe obesity, which is further complicated by adult onset diabetes mellitus, hypertension, obstructive sleep apnea - CPAP and weight related arthopathies  that the patient is now seeking out bariatric surgery, specifically, the gastric bypass      Past Medical History:   Diagnosis Date    Chronic back pain     Diabetes ([address]     Hypertension     Unspecified sleep apnea     Use CPAP       Past Surgical History:   Procedure Laterality Date    HC KNEE ARTHROSCOPY/SURGERY Left     NEUROLOGICAL PROCEDURE UNLISTED      spinal fusion X10 Lumbar region       Current Outpatient Prescriptions   Medication Sig Dispense Refill    levothyroxine (SYNTHROID) 137 mcg tablet Take  by mouth Daily (before breakfast).  traMADol (ULTRAM) 50 mg tablet Take 1 Tab by mouth every six (6) hours as needed for Pain. Max Daily Amount: 200 mg. 12 Tab 0    cholecalciferol (VITAMIN D3) 1,000 unit cap Take  by mouth daily.  ferrous sulfate 15 mg iron, 75 mg,/mL (DONNA-IN-SOL) 15 mg iron (75 mg)/mL drop drops Take 15 mg by mouth daily.  potassium chloride SA (MICRO-K) 10 mEq capsule Take 10 mEq by mouth daily.  metFORMIN (GLUCOPHAGE) 1,000 mg tablet Take 1,000 mg by mouth two (2) times daily (with meals).  insulin glargine (LANTUS) 100 unit/mL injection 50 Units by SubCUTAneous route nightly.  losartan (COZAAR) 100 mg tablet Take 100 mg by mouth daily.       atorvastatin (LIPITOR) 10 mg tablet Take 10 mg by mouth daily.  amLODIPine (NORVASC) 10 mg tablet Take 10 mg by mouth daily.  hydrochlorothiazide (HYDRODIURIL) 25 mg tablet Take 25 mg by mouth daily. No Known Allergies    Social History   Substance Use Topics    Smoking status: Never Smoker    Smokeless tobacco: Never Used    Alcohol use Yes       Family History   Problem Relation Age of Onset    No Known Problems Mother     Diabetes Father     Hypertension Father      Family Status   Relation Status    Mother Alive    Father        Review of Systems:  Positive in BOLD     CONST: Fever, weight loss, fatigue or chills  GI: Nausea, vomiting, abdominal pain, change in bowel habits, hematochezia, melena, and GERD   INTEG: Dermatitis, abnormal moles  HEENT: Recent changes in vision, vertigo, epistaxis, dysphagia and hoarseness  CV: Chest pain, palpitations, HTN, edema and varicosities  RESP: Cough, shortness of breath, wheezing, hemoptysis, snoring and reactive airway disease  : Hematuria, dysuria, frequency, urgency, nocturia and stress urinary incontinence   MS: Weakness, joint pain and arthritis  ENDO: Diabetes, thyroid disease, polyuria, polydipsia, polyphagia, poor wound healing, heat intolerance, cold intolerance  LYMPH/HEME: Anemia, bruising and history of blood transfusions  NEURO: Dizziness, headache, fainting, seizures and stroke  PSYCH: Anxiety and depression    Physical Exam    Visit Vitals    BP (!) 172/104 (BP 1 Location: Left arm, BP Patient Position: At rest)    Pulse 90    Temp 97.1 °F (36.2 °C) (Oral)    Resp 20    Ht 5' 11\" (1.803 m)    Wt 114.8 kg (253 lb)    BMI 35.29 kg/m2     General: 46 y.o.) male in no acute distress. Morbidly obese in abdomen and hips - mixed pattern  HEENT: Normocephalic, atraumatic, Pupils equal and reactive, nasopharynx clear, oropharynx clear and moist without lesions  NECK: Supple, no lymphadenopathy, thyromegaly, carotid bruits or jugular venous distension.  trachea midline  RESP: Clear to auscultation bilaterally, no wheezes, rhonchi, or rales, normal respiratory excursion  CV: Regular rate and rhythm, no murmurs, rubs or gallops. 3+/4 pulses in bilateral dorsalis pedis and posterior tibialis. No distal edema or varicosities. ABD: Soft, nontender, nondistended, normoactive bowel sounds, small umbilical hernias, no hepatosplenomegaly, easily palpable costal margins, mixed distribution  Extremities: Warm, well perfused, no tenderness or swelling, normal gait/station  Neuro: Sensation and strength grossly intact and symmetrical  Psych: Alert and oriented to person, place, and time. Studies:    LABS: within normal limits except for hypovit D   EKG: abnormal  NUTRITIONAL EVALUATION has deemed her a good candidate for weight loss surgery - lost 4 lbs  PSYCHIATRIC EVALUATION has deemed her a good candidate for weight loss surgery  CV clear pending   PCP clear pending    Impression:    Mary Rios is a 48 y.o. male who is suffering from morbid obesity and comorbidities including adult onset diabetes mellitus, hypertension, hyperlipidemia and obstructive sleep apnea - CPAPwho would benefit from bariatric surgery. We've discussed the restrictive and malabsorptive nature of the gastric bypass and compared and contrasted with the sleeve gastrectomy. The patient understands the likelihood of losing approximately 80% of their excess weight in 12 to 18 months. He also understands the risks including but not limited to bleeding, infection, need for reoperation, anastomotic ulcers, leaks and strictures, bowel obstruction secondary to adhesions and internal hernias, DVT, PE, heart attack, stroke, and death. Patient also understands risks of inadequate weight loss, excess weight loss, vitamin insufficiency, protein malnutrition, excess skin, and loss of hair.   We have reviewed the components of a successful postoperative course including requirement for a high protein, low carbohydrate diet, 60 oz a day of zero calorie liquids, daily vitamin supplementation, daily exercise, regular follow-up, and participation in support groups. We have reviewed the preoperative liver shrinking clear liquid diet, as well as reviewed any medication changes required while on the clear liquid diet. In addition, the patient understands that all medications to be taken during the first 8 weeks postoperatively can be taken whole as long as the medication is approximately the size of a Janae 325 mg aspirin tablet in size. The patient further understands that it is his/her responsibility to review these and verify with their primary care doctor and pharmacist that all medications are of the appropriate size. We will schedule the patient for laparoscopic gastric bypass  in the near future once clearances are complete. He knows his BP must be better controlled on the day of surgery. He is seeing his PCP next week.

## 2018-07-19 NOTE — LETTER
7/19/2018 2:20 PM 
 
Patient:  Mary Bowers YOB: 1965 Date of Visit: 7/19/2018 Abner Solis MD 
[id number] [facility] [address] [place] [address] [phone] VIA Facsimile: [phone] Dear Abner Solis MD, Thank you for referring Mr. Coronado Dears to [facility]  for evaluation and treatment. Below are the relevant portions of my assessment and plan of care. Preop History and Physical Exam: 
 
Mary Bowers is a 48 y.o. male who comes into the office today after completing the entirety of the bariatric preoperative protocol satisfactorily. he initially identified obesity at the age of 48 and at age 25 weighed 165lbs. he has tried a variety of unsupervised weight-loss attempts, but has yet to meet with lasting success. Today, the patient is Height: 5' 11\" (180.3 cm) tall, Weight: 114.8 kg (253 lb) lbs for a Body mass index is 35.29 kg/(m^2). It is due to their severe obesity, which is further complicated by adult onset diabetes mellitus, hypertension, obstructive sleep apnea - CPAP and weight related arthopathies  that the patient is now seeking out bariatric surgery, specifically, the gastric bypass Past Medical History:  
Diagnosis Date  Chronic back pain  Diabetes ([address]  Hypertension  Unspecified sleep apnea Use CPAP Past Surgical History:  
Procedure Laterality Date  HC KNEE ARTHROSCOPY/SURGERY Left  NEUROLOGICAL PROCEDURE UNLISTED    
 spinal fusion X10 Lumbar region Current Outpatient Prescriptions Medication Sig Dispense Refill  levothyroxine (SYNTHROID) 137 mcg tablet Take  by mouth Daily (before breakfast).  traMADol (ULTRAM) 50 mg tablet Take 1 Tab by mouth every six (6) hours as needed for Pain. Max Daily Amount: 200 mg. 12 Tab 0  cholecalciferol (VITAMIN D3) 1,000 unit cap Take  by mouth daily.     
 ferrous sulfate 15 mg iron, 75 mg,/mL (DONNA-IN-SOL) 15 mg iron (75 mg)/mL drop drops Take 15 mg by mouth daily.  potassium chloride SA (MICRO-K) 10 mEq capsule Take 10 mEq by mouth daily.  metFORMIN (GLUCOPHAGE) 1,000 mg tablet Take 1,000 mg by mouth two (2) times daily (with meals).  insulin glargine (LANTUS) 100 unit/mL injection 50 Units by SubCUTAneous route nightly.  losartan (COZAAR) 100 mg tablet Take 100 mg by mouth daily.  atorvastatin (LIPITOR) 10 mg tablet Take 10 mg by mouth daily.  amLODIPine (NORVASC) 10 mg tablet Take 10 mg by mouth daily.  hydrochlorothiazide (HYDRODIURIL) 25 mg tablet Take 25 mg by mouth daily. No Known Allergies Social History Substance Use Topics  Smoking status: Never Smoker  Smokeless tobacco: Never Used  Alcohol use Yes Family History Problem Relation Age of Onset  No Known Problems Mother  Diabetes Father  Hypertension Father Family Status Relation Status  Mother Alive  Father  Review of Systems:  Positive in BOLD 
  
CONST: Fever, weight loss, fatigue or chills GI: Nausea, vomiting, abdominal pain, change in bowel habits, hematochezia, melena, and GERD INTEG: Dermatitis, abnormal moles HEENT: Recent changes in vision, vertigo, epistaxis, dysphagia and hoarseness CV: Chest pain, palpitations, HTN, edema and varicosities RESP: Cough, shortness of breath, wheezing, hemoptysis, snoring and reactive airway disease : Hematuria, dysuria, frequency, urgency, nocturia and stress urinary incontinence MS: Weakness, joint pain and arthritis ENDO: Diabetes, thyroid disease, polyuria, polydipsia, polyphagia, poor wound healing, heat intolerance, cold intolerance LYMPH/HEME: Anemia, bruising and history of blood transfusions NEURO: Dizziness, headache, fainting, seizures and stroke PSYCH: Anxiety and depression Physical Exam 
 
Visit Vitals  BP (!) 172/104 (BP 1 Location: Left arm, BP Patient Position: At rest)  Pulse 90  Temp 97.1 °F (36.2 °C) (Oral)  Resp 20  
 Ht 5' 11\" (1.803 m)  Wt 114.8 kg (253 lb)  BMI 35.29 kg/m2 General: 46 y.o.) male in no acute distress. Morbidly obese in abdomen and hips - mixed pattern HEENT: Normocephalic, atraumatic, Pupils equal and reactive, nasopharynx clear, oropharynx clear and moist without lesions NECK: Supple, no lymphadenopathy, thyromegaly, carotid bruits or jugular venous distension. trachea midline RESP: Clear to auscultation bilaterally, no wheezes, rhonchi, or rales, normal respiratory excursion CV: Regular rate and rhythm, no murmurs, rubs or gallops. 3+/4 pulses in bilateral dorsalis pedis and posterior tibialis. No distal edema or varicosities. ABD: Soft, nontender, nondistended, normoactive bowel sounds, small umbilical hernias, no hepatosplenomegaly, easily palpable costal margins, mixed distribution Extremities: Warm, well perfused, no tenderness or swelling, normal gait/station Neuro: Sensation and strength grossly intact and symmetrical 
Psych: Alert and oriented to person, place, and time. Studies: LABS: within normal limits except for hypovit D  
EKG: abnormal 
NUTRITIONAL EVALUATION has deemed her a good candidate for weight loss surgery - lost 4 lbs PSYCHIATRIC EVALUATION has deemed her a good candidate for weight loss surgery CV clear pending PCP clear pending Impression: Barry Almeida is a 48 y.o. male who is suffering from morbid obesity and comorbidities including adult onset diabetes mellitus, hypertension, hyperlipidemia and obstructive sleep apnea - CPAPwho would benefit from bariatric surgery. We've discussed the restrictive and malabsorptive nature of the gastric bypass and compared and contrasted with the sleeve gastrectomy. The patient understands the likelihood of losing approximately 80% of their excess weight in 12 to 18 months.  He also understands the risks including but not limited to bleeding, infection, need for reoperation, anastomotic ulcers, leaks and strictures, bowel obstruction secondary to adhesions and internal hernias, DVT, PE, heart attack, stroke, and death. Patient also understands risks of inadequate weight loss, excess weight loss, vitamin insufficiency, protein malnutrition, excess skin, and loss of hair. We have reviewed the components of a successful postoperative course including requirement for a high protein, low carbohydrate diet, 60 oz a day of zero calorie liquids, daily vitamin supplementation, daily exercise, regular follow-up, and participation in support groups. We have reviewed the preoperative liver shrinking clear liquid diet, as well as reviewed any medication changes required while on the clear liquid diet. In addition, the patient understands that all medications to be taken during the first 8 weeks postoperatively can be taken whole as long as the medication is approximately the size of a Janae 325 mg aspirin tablet in size. The patient further understands that it is his/her responsibility to review these and verify with their primary care doctor and pharmacist that all medications are of the appropriate size. We will schedule the patient for laparoscopic gastric bypass  in the near future once clearances are complete. He knows his BP must be better controlled on the day of surgery. He is seeing his PCP next week. Thank you very much for your referral of Mr. Naomi Mehta. If you have questions, please do not hesitate to call me. I look forward to following . Saeed Correa along with you and will keep you updated as to his progress.   
 
 
 
 
Sincerely, 
 
 
Valentine Santana MD

## 2018-07-19 NOTE — PROGRESS NOTES
Joann Hoffman is a 48 y.o. male who presents today with   Chief Complaint   Patient presents with    Morbid Obesity     Pt presents today to discuss surgical options. Body mass index is 35.29 kg/(m^2). 1. Have you been to the ER, urgent care clinic since your last visit? Hospitalized since your last visit? No    2. Have you seen or consulted any other health care providers outside of the [address] [place] [address] since your last visit? Include any pap smears or colon screening.  No

## 2018-07-19 NOTE — COMMUNICATION BODY
Preop History and Physical Exam:    Viri Bravo is a 48 y.o. male who comes into the office today after completing the entirety of the bariatric preoperative protocol satisfactorily. he initially identified obesity at the age of 48 and at age 25 weighed 165lbs. he has tried a variety of unsupervised weight-loss attempts, but has yet to meet with lasting success. Today, the patient is Height: 5' 11\" (180.3 cm) tall, Weight: 114.8 kg (253 lb) lbs for a Body mass index is 35.29 kg/(m^2). It is due to their severe obesity, which is further complicated by adult onset diabetes mellitus, hypertension, obstructive sleep apnea - CPAP and weight related arthopathies  that the patient is now seeking out bariatric surgery, specifically, the gastric bypass      Past Medical History:   Diagnosis Date    Chronic back pain     Diabetes ([address]     Hypertension     Unspecified sleep apnea     Use CPAP       Past Surgical History:   Procedure Laterality Date    HC KNEE ARTHROSCOPY/SURGERY Left     NEUROLOGICAL PROCEDURE UNLISTED      spinal fusion X10 Lumbar region       Current Outpatient Prescriptions   Medication Sig Dispense Refill    levothyroxine (SYNTHROID) 137 mcg tablet Take  by mouth Daily (before breakfast).  traMADol (ULTRAM) 50 mg tablet Take 1 Tab by mouth every six (6) hours as needed for Pain. Max Daily Amount: 200 mg. 12 Tab 0    cholecalciferol (VITAMIN D3) 1,000 unit cap Take  by mouth daily.  ferrous sulfate 15 mg iron, 75 mg,/mL (DONNA-IN-SOL) 15 mg iron (75 mg)/mL drop drops Take 15 mg by mouth daily.  potassium chloride SA (MICRO-K) 10 mEq capsule Take 10 mEq by mouth daily.  metFORMIN (GLUCOPHAGE) 1,000 mg tablet Take 1,000 mg by mouth two (2) times daily (with meals).  insulin glargine (LANTUS) 100 unit/mL injection 50 Units by SubCUTAneous route nightly.  losartan (COZAAR) 100 mg tablet Take 100 mg by mouth daily.       atorvastatin (LIPITOR) 10 mg tablet Take 10 mg by mouth daily.  amLODIPine (NORVASC) 10 mg tablet Take 10 mg by mouth daily.  hydrochlorothiazide (HYDRODIURIL) 25 mg tablet Take 25 mg by mouth daily. No Known Allergies    Social History   Substance Use Topics    Smoking status: Never Smoker    Smokeless tobacco: Never Used    Alcohol use Yes       Family History   Problem Relation Age of Onset    No Known Problems Mother     Diabetes Father     Hypertension Father      Family Status   Relation Status    Mother Alive    Father        Review of Systems:  Positive in BOLD     CONST: Fever, weight loss, fatigue or chills  GI: Nausea, vomiting, abdominal pain, change in bowel habits, hematochezia, melena, and GERD   INTEG: Dermatitis, abnormal moles  HEENT: Recent changes in vision, vertigo, epistaxis, dysphagia and hoarseness  CV: Chest pain, palpitations, HTN, edema and varicosities  RESP: Cough, shortness of breath, wheezing, hemoptysis, snoring and reactive airway disease  : Hematuria, dysuria, frequency, urgency, nocturia and stress urinary incontinence   MS: Weakness, joint pain and arthritis  ENDO: Diabetes, thyroid disease, polyuria, polydipsia, polyphagia, poor wound healing, heat intolerance, cold intolerance  LYMPH/HEME: Anemia, bruising and history of blood transfusions  NEURO: Dizziness, headache, fainting, seizures and stroke  PSYCH: Anxiety and depression    Physical Exam    Visit Vitals    BP (!) 172/104 (BP 1 Location: Left arm, BP Patient Position: At rest)    Pulse 90    Temp 97.1 °F (36.2 °C) (Oral)    Resp 20    Ht 5' 11\" (1.803 m)    Wt 114.8 kg (253 lb)    BMI 35.29 kg/m2     General: 46 y.o.) male in no acute distress. Morbidly obese in abdomen and hips - mixed pattern  HEENT: Normocephalic, atraumatic, Pupils equal and reactive, nasopharynx clear, oropharynx clear and moist without lesions  NECK: Supple, no lymphadenopathy, thyromegaly, carotid bruits or jugular venous distension.  trachea midline  RESP: Clear to auscultation bilaterally, no wheezes, rhonchi, or rales, normal respiratory excursion  CV: Regular rate and rhythm, no murmurs, rubs or gallops. 3+/4 pulses in bilateral dorsalis pedis and posterior tibialis. No distal edema or varicosities. ABD: Soft, nontender, nondistended, normoactive bowel sounds, small umbilical hernias, no hepatosplenomegaly, easily palpable costal margins, mixed distribution  Extremities: Warm, well perfused, no tenderness or swelling, normal gait/station  Neuro: Sensation and strength grossly intact and symmetrical  Psych: Alert and oriented to person, place, and time. Studies:    LABS: within normal limits except for hypovit D   EKG: abnormal  NUTRITIONAL EVALUATION has deemed her a good candidate for weight loss surgery - lost 4 lbs  PSYCHIATRIC EVALUATION has deemed her a good candidate for weight loss surgery  CV clear pending   PCP clear pending    Impression:    Fely Calero is a 48 y.o. male who is suffering from morbid obesity and comorbidities including adult onset diabetes mellitus, hypertension, hyperlipidemia and obstructive sleep apnea - CPAPwho would benefit from bariatric surgery. We've discussed the restrictive and malabsorptive nature of the gastric bypass and compared and contrasted with the sleeve gastrectomy. The patient understands the likelihood of losing approximately 80% of their excess weight in 12 to 18 months. He also understands the risks including but not limited to bleeding, infection, need for reoperation, anastomotic ulcers, leaks and strictures, bowel obstruction secondary to adhesions and internal hernias, DVT, PE, heart attack, stroke, and death. Patient also understands risks of inadequate weight loss, excess weight loss, vitamin insufficiency, protein malnutrition, excess skin, and loss of hair.   We have reviewed the components of a successful postoperative course including requirement for a high protein, low carbohydrate diet, 60 oz a day of zero calorie liquids, daily vitamin supplementation, daily exercise, regular follow-up, and participation in support groups. We have reviewed the preoperative liver shrinking clear liquid diet, as well as reviewed any medication changes required while on the clear liquid diet. In addition, the patient understands that all medications to be taken during the first 8 weeks postoperatively can be taken whole as long as the medication is approximately the size of a Janae 325 mg aspirin tablet in size. The patient further understands that it is his/her responsibility to review these and verify with their primary care doctor and pharmacist that all medications are of the appropriate size. We will schedule the patient for laparoscopic gastric bypass  in the near future once clearances are complete. He knows his BP must be better controlled on the day of surgery. He is seeing his PCP next week.

## 2018-07-19 NOTE — PATIENT INSTRUCTIONS
If you have any questions or concerns about today's appointment, the verbal and/or written instructions you were given for follow up care, please call our office at [phone]. [facility] - [address]    [phone] office  [phone]    Take the following medications as noted. - If the medication is circled, take with a sip of water on the morning of surgery prior to reporting to the hospital  - If the medication has a line drawn through it, do not take that medication after starting your liquid diet before surgery  - If the medication has a star beside it, change its dosing as written beside it on the date written beside it. Current Outpatient Prescriptions   Medication Sig Dispense Refill    levothyroxine (SYNTHROID) 137 mcg tablet Take  by mouth Daily (before breakfast).  traMADol (ULTRAM) 50 mg tablet Take 1 Tab by mouth every six (6) hours as needed for Pain. Max Daily Amount: 200 mg. 12 Tab 0    cholecalciferol (VITAMIN D3) 1,000 unit cap Take  by mouth daily.  ferrous sulfate 15 mg iron, 75 mg,/mL (DONNA-IN-SOL) 15 mg iron (75 mg)/mL drop drops Take 15 mg by mouth daily.  potassium chloride SA (MICRO-K) 10 mEq capsule Take 10 mEq by mouth daily.  metFORMIN (GLUCOPHAGE) 1,000 mg tablet Take 1,000 mg by mouth two (2) times daily (with meals).  insulin glargine (LANTUS) 100 unit/mL injection 50 Units by SubCUTAneous route nightly.  losartan (COZAAR) 100 mg tablet Take 100 mg by mouth daily.  atorvastatin (LIPITOR) 10 mg tablet Take 10 mg by mouth daily.  amLODIPine (NORVASC) 10 mg tablet Take 10 mg by mouth daily.  hydrochlorothiazide (HYDRODIURIL) 25 mg tablet Take 25 mg by mouth daily.

## 2018-07-23 ENCOUNTER — TELEPHONE (OUTPATIENT)
Dept: SURGERY | Age: 53
End: 2018-07-23

## 2018-08-20 ENCOUNTER — TELEPHONE (OUTPATIENT)
Dept: CARDIOLOGY CLINIC | Age: 53
End: 2018-08-20

## 2018-08-20 NOTE — TELEPHONE ENCOUNTER
Patient is having gastric bypass with Dr. Efrain Rm office on September 11, 2018. Needs to know if he can be cleared for surgery. Advised will talk to Dr. Chery Stover tomorrow to see if he is able to proceed with surgery.

## 2018-08-28 ENCOUNTER — TELEPHONE (OUTPATIENT)
Dept: SURGERY | Age: 53
End: 2018-08-28

## 2018-08-29 NOTE — TELEPHONE ENCOUNTER
Message  Received: 1 week ago       MD Leo Allen       Caller: Unspecified (1 week ago)                     He needs to come see me for discussion for possible cath before surgery   He had mildly abnormal stress test            Previous Messages

## 2021-07-15 ENCOUNTER — IMPORTED ENCOUNTER (OUTPATIENT)
Dept: URBAN - METROPOLITAN AREA CLINIC 1 | Facility: CLINIC | Age: 56
End: 2021-07-15

## 2021-07-15 PROBLEM — H52.223: Noted: 2021-07-15

## 2021-07-15 PROBLEM — H52.13: Noted: 2021-07-15

## 2021-07-15 PROBLEM — H52.4: Noted: 2021-07-15

## 2021-07-15 PROCEDURE — S0620 ROUTINE OPHTHALMOLOGICAL EXA: HCPCS

## 2021-07-15 NOTE — PATIENT DISCUSSION
1. Myopia with Astigmatism OU -- Rx was given for correction if indicated and requested. 2. Presbyopia3. DM Type II (Insulin) without signs of Diabetic Retinopathy and no signs of Macular edema OU on today's dilated exam.4. Cataracts OU -- Observe5. Alexey Aguilar for an appointment in 1 year 36 (Dilated) with Dr. Rafal Palomo.

## 2021-08-11 NOTE — PATIENT DISCUSSION
Recommend Mini lower lift, +/- platysmaplasty, post-tragel, NO SMAS to *(discussed risks and benefits of sx. .. ).

## 2021-08-11 NOTE — PATIENT DISCUSSION
Recommend Bilateral upper lid blepharoplasty. Medicare (discussed risks and benefits of sx. ..).  If patient desires BLL Blepharoplasty will remove fat to upper lids @ [facility].

## 2021-08-19 NOTE — PATIENT DISCUSSION
This visual field clearly demonstrated a minimum of 45% loss of upper field of vision OU, with upper lid skin in repose and elevated by taping of the lid to demonstrate potential correction. This field shows that taping the lids significantly improved this patient's superior field of vision by approximately 43%, OU.

## 2022-03-19 PROBLEM — M96.1 LUMBAR POSTLAMINECTOMY SYNDROME: Status: ACTIVE | Noted: 2017-07-07

## 2022-03-19 PROBLEM — I10 ESSENTIAL HYPERTENSION: Status: ACTIVE | Noted: 2018-04-03

## 2022-03-19 PROBLEM — M54.14 THORACIC AND LUMBOSACRAL NEURITIS: Status: ACTIVE | Noted: 2017-07-07

## 2022-03-19 PROBLEM — M54.17 THORACIC AND LUMBOSACRAL NEURITIS: Status: ACTIVE | Noted: 2017-07-07

## 2022-03-19 PROBLEM — M54.17 LUMBOSACRAL RADICULITIS: Status: ACTIVE | Noted: 2017-07-07

## 2022-03-19 PROBLEM — E11.9 CONTROLLED TYPE 2 DIABETES MELLITUS WITHOUT COMPLICATION, WITH LONG-TERM CURRENT USE OF INSULIN (HCC): Status: ACTIVE | Noted: 2018-04-03

## 2022-03-19 PROBLEM — Z79.4 CONTROLLED TYPE 2 DIABETES MELLITUS WITHOUT COMPLICATION, WITH LONG-TERM CURRENT USE OF INSULIN (HCC): Status: ACTIVE | Noted: 2018-04-03

## 2022-03-19 PROBLEM — G60.9 IDIOPATHIC PERIPHERAL NEUROPATHY: Status: ACTIVE | Noted: 2017-07-07

## 2022-03-20 PROBLEM — M51.24 DISPLACEMENT OF THORACIC INTERVERTEBRAL DISC WITHOUT MYELOPATHY: Status: ACTIVE | Noted: 2017-07-07

## 2022-03-20 PROBLEM — E66.01 MORBID OBESITY (HCC): Status: ACTIVE | Noted: 2018-04-03

## 2022-04-02 ASSESSMENT — KERATOMETRY
OD_AXISANGLE_DEGREES: 037
OD_K2POWER_DIOPTERS: 41.00
OS_K2POWER_DIOPTERS: 39.50
OD_AXISANGLE2_DEGREES: 127
OD_K1POWER_DIOPTERS: 40.75
OS_AXISANGLE_DEGREES: 180
OS_K1POWER_DIOPTERS: 39.25
OS_AXISANGLE2_DEGREES: 090

## 2022-04-02 ASSESSMENT — VISUAL ACUITY
OD_CC: 20/25
OS_CC: 20/25

## 2022-04-02 ASSESSMENT — TONOMETRY
OS_IOP_MMHG: 16
OD_IOP_MMHG: 15

## 2022-06-23 ENCOUNTER — OFFICE VISIT (OUTPATIENT)
Dept: ORTHOPEDIC SURGERY | Age: 57
End: 2022-06-23
Payer: OTHER GOVERNMENT

## 2022-06-23 VITALS
BODY MASS INDEX: 34.33 KG/M2 | TEMPERATURE: 97.3 F | OXYGEN SATURATION: 98 % | HEIGHT: 71 IN | HEART RATE: 95 BPM | WEIGHT: 245.2 LBS

## 2022-06-23 DIAGNOSIS — G89.29 CHRONIC PAIN OF LEFT KNEE: Primary | ICD-10-CM

## 2022-06-23 DIAGNOSIS — M23.92 LOCKING OF LEFT KNEE: ICD-10-CM

## 2022-06-23 DIAGNOSIS — M17.12 PRIMARY OSTEOARTHRITIS OF LEFT KNEE: ICD-10-CM

## 2022-06-23 DIAGNOSIS — M25.562 CHRONIC PAIN OF LEFT KNEE: Primary | ICD-10-CM

## 2022-06-23 DIAGNOSIS — M25.562 MECHANICAL PAIN OF LEFT KNEE: ICD-10-CM

## 2022-06-23 PROCEDURE — 20610 DRAIN/INJ JOINT/BURSA W/O US: CPT | Performed by: ORTHOPAEDIC SURGERY

## 2022-06-23 PROCEDURE — 73564 X-RAY EXAM KNEE 4 OR MORE: CPT | Performed by: ORTHOPAEDIC SURGERY

## 2022-06-23 PROCEDURE — 99204 OFFICE O/P NEW MOD 45 MIN: CPT | Performed by: ORTHOPAEDIC SURGERY

## 2022-06-23 RX ORDER — BETAMETHASONE SODIUM PHOSPHATE AND BETAMETHASONE ACETATE 3; 3 MG/ML; MG/ML
6 INJECTION, SUSPENSION INTRA-ARTICULAR; INTRALESIONAL; INTRAMUSCULAR; SOFT TISSUE ONCE
Status: COMPLETED | OUTPATIENT
Start: 2022-06-23 | End: 2022-06-23

## 2022-06-23 RX ADMIN — BETAMETHASONE SODIUM PHOSPHATE AND BETAMETHASONE ACETATE 6 MG: 3; 3 INJECTION, SUSPENSION INTRA-ARTICULAR; INTRALESIONAL; INTRAMUSCULAR; SOFT TISSUE at 08:37

## 2022-06-23 NOTE — PROGRESS NOTES
Patient: Gwyn Trevino                MRN: [id number]       SSN: [id number]  YOB: 1965        AGE: 64 y.o. SEX: male  Body mass index is 34.2 kg/m². PCP: Shiloh Wilhelm MD  06/23/22    Mr. Mattie Valverde presents today with bilateral knee pain worse on the left and on the right and some injections in the past not all that helpful he does get some mechanical symptoms once or twice a day inconsistently but is really issue is is that he cannot walk any longer than 5 minutes without pain difficulty with stairs kneeling getting up and down from a chair he does sedentary computer work and when he sits for a while and goes to stand up it really bothers him the pain is moderate and usually nonradicular denies fevers or chills otherwise been feeling well he is diabetic and hypertension and the exam today very pleasant gentleman lateral thrust mildly antalgic gait positive movie theater sign when he gets up from a chair both hips rotate nicely he is neurologically intact and Chelly Girt' sign is negative.     Review of his x-rays today on 6/23/2022 4 views of the left knee confirm end-stage arthritis left knee MRI confirms this    I do not think he should have an arthroscopy I think he should go straight to total knee replacement when the timing is right for him there was a discussion regarding surgery was decided that it is recommended form    Would also like to try with an injection today which I think is a good idea should give him some temporary relief therefore left knee injected as per protocol    Risks and benefits described including but not limited to infection DVT pulmonary embolism anesthetic complications blood loss requiring transfusion chronic pain instability implant longevity arthrofibrosis and also the need for bending and straightening knee on an hourly basis to avoid complications    REVIEW OF SYSTEMS:      CON: negative  EYE: negative   ENT: negative  RESP: negative  GI: negative   :  negative  MSK: Positive  A twelve point review of systems was completed, positives noted and all other systems were reviewed and are negative          Past Medical History:   Diagnosis Date    Chronic back pain     Diabetes ([address]     Hypertension     Unspecified sleep apnea     Use CPAP       Family History   Problem Relation Age of Onset    No Known Problems Mother     Diabetes Father     Hypertension Father        Current Outpatient Medications   Medication Sig Dispense Refill    levothyroxine (SYNTHROID) 137 mcg tablet Take  by mouth Daily (before breakfast).  traMADol (ULTRAM) 50 mg tablet Take 1 Tab by mouth every six (6) hours as needed for Pain. Max Daily Amount: 200 mg. 12 Tab 0    cholecalciferol (VITAMIN D3) 1,000 unit cap Take  by mouth daily.  ferrous sulfate 15 mg iron, 75 mg,/mL (DONNA-IN-SOL) 15 mg iron (75 mg)/mL drop drops Take 15 mg by mouth daily.  potassium chloride SA (MICRO-K) 10 mEq capsule Take 10 mEq by mouth daily.  metFORMIN (GLUCOPHAGE) 1,000 mg tablet Take 1,000 mg by mouth two (2) times daily (with meals).  insulin glargine (LANTUS) 100 unit/mL injection 50 Units by SubCUTAneous route nightly.  losartan (COZAAR) 100 mg tablet Take 100 mg by mouth daily.  atorvastatin (LIPITOR) 10 mg tablet Take 10 mg by mouth daily.  amLODIPine (NORVASC) 10 mg tablet Take 10 mg by mouth daily.  hydrochlorothiazide (HYDRODIURIL) 25 mg tablet Take 25 mg by mouth daily.        Current Facility-Administered Medications   Medication Dose Route Frequency Provider Last Rate Last Admin    betamethasone (CELESTONE) injection 6 mg  6 mg Intra artICUlar ONCE Alirio Helm MD           No Known Allergies    Past Surgical History:   Procedure Laterality Date    [facility] KNEE ARTHROSCOPY[facility]SURGERY Left     NEUROLOGICAL PROCEDURE UNLISTED      spinal fusion X10 Lumbar region       Social History     Socioeconomic History    Marital status:  Spouse name: Not on file    Number of children: Not on file    Years of education: Not on file    Highest education level: Not on file   Occupational History    Not on file   Tobacco Use    Smoking status: Never Smoker    Smokeless tobacco: Never Used   Substance and Sexual Activity    Alcohol use: Yes    Drug use: No    Sexual activity: Not on file   Other Topics Concern    Not on file   Social History Narrative    Not on file     Social Determinants of Health     Financial Resource Strain:     Difficulty of Paying Living Expenses: Not on file   Food Insecurity:     Worried About Running Out of Food in the Last Year: Not on file    Gissel of Food in the Last Year: Not on file   Transportation Needs:     Lack of Transportation (Medical): Not on file    Lack of Transportation (Non-Medical):  Not on file   Physical Activity: Inactive    Days of Exercise per Week: 0 days    Minutes of Exercise per Session: 0 min   Stress:     Feeling of Stress : Not on file   Social Connections:     Frequency of Communication with Friends and Family: Not on file    Frequency of Social Gatherings with Friends and Family: Not on file    Attends Judaism Services: Not on file    Active Member of [address] [place] [address] or Organizations: Not on file    Attends Club or Organization Meetings: Not on file    Marital Status: Not on file   Intimate Partner Violence: Not At Risk    Fear of Current or Ex-Partner: No    Emotionally Abused: No    Physically Abused: No    Sexually Abused: No   Housing Stability:     Unable to Pay for Housing in the Last Year: Not on file    Number of [place] in the Last Year: Not on file    Unstable Housing in the Last Year: Not on file       Visit Vitals  Pulse 95   Temp 97.3 °F (36.3 °C) (Temporal)   Ht 5' 11\" (1.803 m)   Wt 111.2 kg (245 lb 3.2 oz)   SpO2 98%   BMI 34.20 kg/m²         PHYSICAL EXAMINATION:  GENERAL: Alert and oriented x3, in no acute distress, well-developed, well-nourished, afebrile. HEART: No JVD. EYES: No scleral icterus   NECK: No significant lymphadenopathy   LUNGS: No respiratory compromise or indrawing  ABDOMEN: Soft, non-tender, non-distended. Note: This note was completed using voice recognition software. Any typographical/name errors or mistakes are unintentional.    Electronically signed by: MD HERMINIA Rockwell, Deborah Lazo M.D., have reviewed the history, physical, and have updated the allergic reactions for Kristi Tyson. TIME OUT performed immediately prior to the start of procedure:  Genesis Louie M.D., have performed the following reviews on Kristi Tyson prior to the start of the procedure:    - Patient was identified by name and date of birth  - Agreement on procedure being performed was verified  - Risks and benefits explained to the patient  - Patient was positioned for comfort  - Consent was signed and verified  - Patient was advised regarding risks of bruising, bleeding, infection and pain    Time: 8:33 AM     Body Part: intra-articular injection of left knee. Medication and Dose: 1mL Celestone preparation, i.e. 6 mg, and 3 mL 1% lidocaine    Date of Procedure: 06/23/22    PROCEDURE PERFORMED BY : Gigi Lazo M.D., [facility])    Provider Assisted by: Chinedu Chase    Patient assisted by: self    Patient tolerated procedure well with no complications

## 2022-07-25 ENCOUNTER — COMPREHENSIVE EXAM (OUTPATIENT)
Dept: URBAN - METROPOLITAN AREA CLINIC 1 | Facility: CLINIC | Age: 57
End: 2022-07-25

## 2022-07-25 DIAGNOSIS — H52.13: ICD-10-CM

## 2022-07-25 DIAGNOSIS — H52.223: ICD-10-CM

## 2022-07-25 DIAGNOSIS — H52.4: ICD-10-CM

## 2022-07-25 PROCEDURE — 92014 COMPRE OPH EXAM EST PT 1/>: CPT

## 2022-07-25 ASSESSMENT — TONOMETRY
OD_IOP_MMHG: 15
OS_IOP_MMHG: 15

## 2022-07-25 ASSESSMENT — KERATOMETRY
OS_AXISANGLE_DEGREES: 180
OD_AXISANGLE2_DEGREES: 127
OS_K2POWER_DIOPTERS: 39.50
OS_K1POWER_DIOPTERS: 39.25
OD_K2POWER_DIOPTERS: 41.00
OD_AXISANGLE_DEGREES: 037
OS_AXISANGLE2_DEGREES: 090
OD_K1POWER_DIOPTERS: 40.75

## 2022-07-25 ASSESSMENT — VISUAL ACUITY
OD_CC: J1+
OS_CC: 20/25-2
OS_CC: J1+
OD_CC: 20/20

## 2022-09-26 NOTE — PROCEDURE NOTE: SURGICAL
"<p><strong>PREOPERATIVE DIAGNOSIS:<span>&nbsp;&nbsp;&nbsp;&nbsp;&nbsp;&nbsp;&nbsp;&nbsp;&nbsp;&nbsp;&nbsp;&nbsp;&nbsp;&nbsp; </span></strong><span>1</span>. Dermatochalasis upper lids</p><p style=""tab-stops:-1.0in;""><span>&nbsp;&nbsp;&nbsp;&nbsp;&nbsp;&nbsp;&nbsp;&nbsp;&nbsp;&nbsp;&nbsp;&nbsp;&nbsp;&nbsp;&nbsp;&nbsp;&nbsp;&nbsp;&nbsp;&nbsp;&nbsp;&nbsp;&nbsp;&nbsp;&nbsp;&nbsp;&nbsp;&nbsp;&nbsp;&nbsp;&nbsp;&nbsp;&nbsp;&nbsp;&nbsp;&nbsp;&nbsp;&nbsp;&nbsp;&nbsp;&nbsp;&nbsp;&nbsp;&nbsp;&nbsp;&nbsp;&nbsp;&nbsp;&nbsp;&nbsp;&nbsp;&nbsp;&nbsp;&nbsp;&nbsp;&nbsp;&nbsp;&nbsp;&nbsp;&nbsp;</span>2. Lower lid fat herniation and excess skin. <span>&nbsp; </span></p><p style=""tab-stops:-1.0in;""><span>&nbsp; &nbsp; &nbsp; &nbsp; &nbsp; &nbsp; &nbsp; &nbsp; &nbsp; &nbsp; &nbsp; &nbsp; &nbsp; &nbsp; &nbsp; &nbsp; &nbsp; &nbsp; &nbsp; &nbsp; &nbsp; &nbsp; &nbsp; &nbsp; &nbsp; &nbsp; &nbsp; &nbsp; &nbsp; &nbsp; </span>3.  Bags

## 2022-10-03 NOTE — PATIENT DISCUSSION
One week post op: great curve and shape, no infection, healing well, sutures intact and removed 10/3/22, use erythromycin QHS to upper eyelids x 7-10 days. Skinuva QHS x 1 month to incisions after 10 days. Wear sunglasses and hat while outdoors. Avoid sun exposure. No restrictions in 5 days.

## 2022-10-04 NOTE — PATIENT DISCUSSION
One week post op: lids in good position, no infection, healing well, use erythromycin QHS to lower eyelids and Tobradex QHS x 7 days. Then move to once per day. Wear sunglasses and hat while outdoors. Avoid sun exposure. No restrictions in 5 days.

## 2022-10-04 NOTE — PATIENT DISCUSSION
One week post op: great curve and shape, no infection, healing well, sutures intact and removed 10/3/22, use erythromycin QHS to upper eyelids x 7 days. Then move to once per day for a week. Wear sunglasses and hat while outdoors. Avoid sun exposure. No restrictions in 5 days.

## 2022-12-06 DIAGNOSIS — M17.12 PRIMARY OSTEOARTHRITIS OF LEFT KNEE: ICD-10-CM

## 2022-12-06 DIAGNOSIS — Z01.818 PREOPERATIVE TESTING: Primary | ICD-10-CM

## 2022-12-12 ENCOUNTER — HOSPITAL ENCOUNTER (OUTPATIENT)
Dept: GENERAL RADIOLOGY | Age: 57
Discharge: HOME | End: 2022-12-12
Payer: COMMERCIAL

## 2022-12-12 ENCOUNTER — HOSPITAL ENCOUNTER (OUTPATIENT)
Dept: PREADMISSION TESTING | Age: 57
Discharge: HOME | End: 2022-12-12
Payer: COMMERCIAL

## 2022-12-12 DIAGNOSIS — M17.12 PRIMARY OSTEOARTHRITIS OF LEFT KNEE: ICD-10-CM

## 2022-12-12 DIAGNOSIS — M25.562 CHRONIC PAIN OF LEFT KNEE: Primary | ICD-10-CM

## 2022-12-12 DIAGNOSIS — G89.29 CHRONIC PAIN OF LEFT KNEE: Primary | ICD-10-CM

## 2022-12-12 DIAGNOSIS — Z01.818 PREOPERATIVE TESTING: ICD-10-CM

## 2022-12-12 LAB
ALBUMIN SERPL-MCNC: 4 G/DL (ref 3.4–5)
ANION GAP SERPL CALC-SCNC: 7 MMOL/L (ref 3–18)
APPEARANCE UR: CLEAR
APTT PPP: 28.2 SEC (ref 23–36.4)
ATRIAL RATE: 78 BPM
BACTERIA URNS QL MICRO: ABNORMAL /HPF
BASOPHILS # BLD: 0 K/UL (ref 0–0.1)
BASOPHILS NFR BLD: 0 % (ref 0–2)
BILIRUB UR QL: NEGATIVE
BUN SERPL-MCNC: 22 MG/DL (ref 7–18)
BUN/CREAT SERPL: 14 (ref 12–20)
CALCIUM SERPL-MCNC: 9.7 MG/DL (ref 8.5–10.1)
CALCULATED P AXIS, ECG09: 66 DEGREES
CALCULATED R AXIS, ECG10: -32 DEGREES
CALCULATED T AXIS, ECG11: 102 DEGREES
CHLORIDE SERPL-SCNC: 105 MMOL/L (ref 100–111)
CO2 SERPL-SCNC: 29 MMOL/L (ref 21–32)
COLOR UR: YELLOW
CREAT SERPL-MCNC: 1.59 MG/DL (ref 0.6–1.3)
DIAGNOSIS, 93000: NORMAL
DIFFERENTIAL METHOD BLD: ABNORMAL
EOSINOPHIL # BLD: 0.1 K/UL (ref 0–0.4)
EOSINOPHIL NFR BLD: 1 % (ref 0–5)
EPITH CASTS URNS QL MICRO: NEGATIVE /LPF (ref 0–5)
ERYTHROCYTE [DISTWIDTH] IN BLOOD BY AUTOMATED COUNT: 13.2 % (ref 11.6–14.5)
EST. AVERAGE GLUCOSE BLD GHB EST-MCNC: 137 MG/DL
GLUCOSE SERPL-MCNC: 116 MG/DL (ref 74–99)
GLUCOSE UR STRIP.AUTO-MCNC: >1000 MG/DL
HBA1C MFR BLD: 6.4 % (ref 4.2–5.6)
HCT VFR BLD AUTO: 42 % (ref 36–48)
HGB BLD-MCNC: 14.1 G/DL (ref 13–16)
HGB UR QL STRIP: ABNORMAL
IMM GRANULOCYTES # BLD AUTO: 0 K/UL (ref 0–0.04)
IMM GRANULOCYTES NFR BLD AUTO: 0 % (ref 0–0.5)
INR PPP: 1 (ref 0.8–1.2)
KETONES UR QL STRIP.AUTO: NEGATIVE MG/DL
LEUKOCYTE ESTERASE UR QL STRIP.AUTO: NEGATIVE
LYMPHOCYTES # BLD: 2.6 K/UL (ref 0.9–3.6)
LYMPHOCYTES NFR BLD: 35 % (ref 21–52)
MCH RBC QN AUTO: 30.3 PG (ref 24–34)
MCHC RBC AUTO-ENTMCNC: 33.6 G/DL (ref 31–37)
MCV RBC AUTO: 90.3 FL (ref 78–100)
MONOCYTES # BLD: 0.6 K/UL (ref 0.05–1.2)
MONOCYTES NFR BLD: 8 % (ref 3–10)
NEUTS SEG # BLD: 4.1 K/UL (ref 1.8–8)
NEUTS SEG NFR BLD: 56 % (ref 40–73)
NITRITE UR QL STRIP.AUTO: NEGATIVE
NRBC # BLD: 0 K/UL (ref 0–0.01)
NRBC BLD-RTO: 0 PER 100 WBC
P-R INTERVAL, ECG05: 162 MS
PH UR STRIP: 5.5 [PH] (ref 5–8)
PLATELET # BLD AUTO: 251 K/UL (ref 135–420)
PMV BLD AUTO: 11.9 FL (ref 9.2–11.8)
POTASSIUM SERPL-SCNC: 4.1 MMOL/L (ref 3.5–5.5)
PROT UR STRIP-MCNC: 30 MG/DL
PROTHROMBIN TIME: 13.1 SEC (ref 11.5–15.2)
Q-T INTERVAL, ECG07: 368 MS
QRS DURATION, ECG06: 92 MS
QTC CALCULATION (BEZET), ECG08: 419 MS
RBC # BLD AUTO: 4.65 M/UL (ref 4.35–5.65)
RBC #/AREA URNS HPF: NEGATIVE /HPF (ref 0–5)
SODIUM SERPL-SCNC: 141 MMOL/L (ref 136–145)
SP GR UR REFRACTOMETRY: 1.03 (ref 1–1.03)
UROBILINOGEN UR QL STRIP.AUTO: 0.2 EU/DL (ref 0.2–1)
VENTRICULAR RATE, ECG03: 78 BPM
WBC # BLD AUTO: 7.4 K/UL (ref 4.6–13.2)
WBC URNS QL MICRO: NEGATIVE /HPF (ref 0–4)

## 2022-12-12 PROCEDURE — 36415 COLL VENOUS BLD VENIPUNCTURE: CPT

## 2022-12-12 PROCEDURE — 85730 THROMBOPLASTIN TIME PARTIAL: CPT

## 2022-12-12 PROCEDURE — 71046 X-RAY EXAM CHEST 2 VIEWS: CPT

## 2022-12-12 PROCEDURE — 82040 ASSAY OF SERUM ALBUMIN: CPT

## 2022-12-12 PROCEDURE — 85610 PROTHROMBIN TIME: CPT

## 2022-12-12 PROCEDURE — 85025 COMPLETE CBC W/AUTO DIFF WBC: CPT

## 2022-12-12 PROCEDURE — 87086 URINE CULTURE/COLONY COUNT: CPT

## 2022-12-12 PROCEDURE — 81001 URINALYSIS AUTO W/SCOPE: CPT

## 2022-12-12 PROCEDURE — 93005 ELECTROCARDIOGRAM TRACING: CPT

## 2022-12-12 PROCEDURE — 80048 BASIC METABOLIC PNL TOTAL CA: CPT

## 2022-12-12 PROCEDURE — 83036 HEMOGLOBIN GLYCOSYLATED A1C: CPT

## 2022-12-13 LAB
BACTERIA SPEC CULT: NORMAL
SERVICE CMNT-IMP: NORMAL

## 2022-12-22 RX ORDER — INSULIN GLARGINE 100 [IU]/ML
50 INJECTION, SOLUTION SUBCUTANEOUS DAILY
COMMUNITY

## 2022-12-22 RX ORDER — LEVOTHYROXINE SODIUM 175 UG/1
175 TABLET ORAL
COMMUNITY

## 2022-12-22 RX ORDER — AMLODIPINE BESYLATE 10 MG/1
TABLET ORAL DAILY
COMMUNITY

## 2022-12-22 RX ORDER — LOSARTAN POTASSIUM 100 MG/1
100 TABLET ORAL DAILY
COMMUNITY

## 2022-12-22 RX ORDER — HYDROCHLOROTHIAZIDE 25 MG/1
25 TABLET ORAL DAILY
COMMUNITY

## 2022-12-22 RX ORDER — ATORVASTATIN CALCIUM 10 MG/1
TABLET, FILM COATED ORAL DAILY
COMMUNITY

## 2022-12-22 NOTE — PERIOP NOTES
PRE-SURGICAL INSTRUCTIONS        Patient's Name:  Raghav De Santiago      [id number] Date:  12/22/2022            Covid Testing Date and Time:    Surgery Date:  1/4/2023                Do NOT eat or drink anything, including candy, gum, or ice chips after midnight on 1/3/2023, unless you have specific instructions from your surgeon or anesthesia provider to do so. You may brush your teeth before coming to the hospital.  No smoking 24 hours prior to the day of surgery. No alcohol 24 hours prior to the day of surgery. No recreational drugs for one week prior to the day of surgery. Leave all valuables, including money/purse, at home. Remove all jewelry, nail polish, acrylic nails, and makeup (including mascara); no lotions powders, deodorant, or perfume/cologne/after shave on the skin. Follow instruction for Hibiclens washes and CHG wipes from surgeon's office. Glasses/contact lenses and dentures may be worn to the hospital.  They will be removed prior to surgery. Call your doctor if symptoms of a cold or illness develop within 24-48 hours prior to your surgery. 11.  If you are having an outpatient procedure, please make arrangements for a responsible ADULT TO [address] [place] [address] and stay with you for 24 hours after your surgery. 12. ONE VISITOR in the hospital at this time for outpatient procedures. Exceptions may be made for surgical admissions, per nursing unit guidelines      Special Instructions:      Bring list of CURRENT medications. Bring inhaler. Bring CPAP machine. Bring any pertinent legal medical records. Take these medications the morning of surgery with a sip of water:  bp meds and per Md  Follow physician instructions about insulin. Follow physician instructions about stopping anticoagulants. Complete bowel prep per MD instructions. On the day of surgery, come in the main entrance of [facility]. Let the  at the desk know you are there for surgery. A staff member will come escort you to the surgical area on the second floor. If you have any questions or concerns, please do not hesitate to call:     (Prior to the day of surgery) PAT department:  [phone]   (Day of surgery) Pre-Op department:  [phone]    These surgical instructions were reviewed with patient during the PAT phone call.

## 2022-12-28 ENCOUNTER — OFFICE VISIT (OUTPATIENT)
Dept: ORTHOPEDIC SURGERY | Age: 57
End: 2022-12-28

## 2022-12-28 ENCOUNTER — HOSPITAL ENCOUNTER (OUTPATIENT)
Dept: PREADMISSION TESTING | Age: 57
Discharge: HOME | End: 2022-12-28
Payer: COMMERCIAL

## 2022-12-28 VITALS
OXYGEN SATURATION: 99 % | RESPIRATION RATE: 18 BRPM | BODY MASS INDEX: 35.08 KG/M2 | HEIGHT: 71 IN | DIASTOLIC BLOOD PRESSURE: 84 MMHG | HEART RATE: 81 BPM | TEMPERATURE: 97.5 F | SYSTOLIC BLOOD PRESSURE: 160 MMHG | WEIGHT: 250.6 LBS

## 2022-12-28 DIAGNOSIS — M17.12 PRIMARY OSTEOARTHRITIS OF LEFT KNEE: ICD-10-CM

## 2022-12-28 DIAGNOSIS — Z01.818 PREOPERATIVE TESTING: ICD-10-CM

## 2022-12-28 DIAGNOSIS — Z01.818 PRE-OP EXAM: Primary | ICD-10-CM

## 2022-12-28 LAB
ABO + RH BLD: NORMAL
BLOOD GROUP ANTIBODIES SERPL: NORMAL
SPECIMEN EXP DATE BLD: NORMAL

## 2022-12-28 PROCEDURE — 36415 COLL VENOUS BLD VENIPUNCTURE: CPT

## 2022-12-28 PROCEDURE — 86900 BLOOD TYPING SEROLOGIC ABO: CPT

## 2022-12-28 RX ORDER — ACETAMINOPHEN 325 MG/1
1000 TABLET ORAL ONCE
OUTPATIENT
Start: 2022-12-28 | End: 2022-12-28

## 2022-12-28 RX ORDER — CELECOXIB 100 MG/1
400 CAPSULE ORAL ONCE
OUTPATIENT
Start: 2022-12-28 | End: 2022-12-28

## 2022-12-28 RX ORDER — PREGABALIN 25 MG/1
75 CAPSULE ORAL ONCE
OUTPATIENT
Start: 2022-12-28 | End: 2022-12-28

## 2022-12-28 NOTE — H&P (VIEW-ONLY)
[facility], [address] [facility]  [phone]           HISTORY & PHYSICAL      Patient: Alessia Church                MRN: [id number]       SSN: [id number]  YOB: 1965        AGE: 62 y.o. SEX: male  Body mass index is 34.95 kg/m². PCP: Danielle Warner MD  12/28/22      CC: left knee end stage OA  Problem List Items Addressed This Visit    None  Visit Diagnoses       Pre-op exam    -  Primary    Primary osteoarthritis of left knee                  HPI:  The patient is a pleasant 62 y.o. whom has end stage OA of their Left knee and has failed conservative treatment including but not limited to NSAIDS, cortisone injections, viscosupplementation, PT, and pain medicine. Due to the current findings and affected activities of daily living, surgical intervention is indicated. The alternatives, risks, complications, as well as expected outcome were discussed. These include but are not limited to infection, blood loss, need for blood transfusion, neurovascular damage, DVT, PE,  post-op stiffness and pain, leg length discrepancy, dislocation, anesthetic complications, prothesis longevity, need for more surgery, MI, stroke, and even death. The patient understands and wishes to proceed with surgery. Past Medical History:   Diagnosis Date    Chronic back pain     Diabetes [address] [address]     Hypertension     Unspecified sleep apnea     Use CPAP         Current Outpatient Medications:     levothyroxine (SYNTHROID) 175 mcg tablet, Take 175 mcg by mouth Daily (before breakfast). , Disp: , Rfl:     amLODIPine (NORVASC) 10 mg tablet, Take  by mouth daily. , Disp: , Rfl:     atorvastatin (LIPITOR) 10 mg tablet, Take  by mouth daily. , Disp: , Rfl:     hydroCHLOROthiazide (HYDRODIURIL) 25 mg tablet, Take 25 mg by mouth daily. , Disp: , Rfl:     insulin glargine (LANTUS,BASAGLAR) 100 unit/mL (3 mL) inpn, 50 Units by SubCUTAneous route daily. , Disp: , Rfl:     losartan (COZAAR) 100 mg tablet, Take 100 mg by mouth daily. , Disp: , Rfl:     traMADol (ULTRAM) 50 mg tablet, Take 1 Tab by mouth every six (6) hours as needed for Pain. Max Daily Amount: 200 mg., Disp: 12 Tab, Rfl: 0    cholecalciferol (VITAMIN D3) 25 mcg (1,000 unit) cap, Take  by mouth daily. , Disp: , Rfl:     potassium chloride SA (MICRO-K) 10 mEq capsule, Take 10 mEq by mouth daily. , Disp: , Rfl:     metFORMIN (GLUCOPHAGE) 1,000 mg tablet, Take 1,000 mg by mouth two (2) times daily (with meals). , Disp: , Rfl:     levothyroxine (SYNTHROID) 137 mcg tablet, Take  by mouth Daily (before breakfast). , Disp: , Rfl:     ferrous sulfate 15 mg iron, 75 mg,/mL (DONNA-IN-SOL) 15 mg iron (75 mg)/mL drop drops, Take 15 mg by mouth daily. , Disp: , Rfl:     insulin glargine (LANTUS) 100 unit/mL injection, 50 Units by SubCUTAneous route nightly., Disp: , Rfl:     losartan (COZAAR) 100 mg tablet, Take 100 mg by mouth daily. , Disp: , Rfl:     atorvastatin (LIPITOR) 10 mg tablet, Take 10 mg by mouth daily. , Disp: , Rfl:     amLODIPine (NORVASC) 10 mg tablet, Take 10 mg by mouth daily. , Disp: , Rfl:     hydrochlorothiazide (HYDRODIURIL) 25 mg tablet, Take 25 mg by mouth daily. , Disp: , Rfl:     No Known Allergies    Social History     Socioeconomic History    Marital status:      Spouse name: Not on file    Number of children: Not on file    Years of education: Not on file    Highest education level: Not on file   Occupational History    Not on file   Tobacco Use    Smoking status: Never    Smokeless tobacco: Never   Vaping Use    Vaping Use: Never used   Substance and Sexual Activity    Alcohol use: Yes    Drug use: Never    Sexual activity: Not on file   Other Topics Concern    Not on file   Social History Narrative    Not on file     Social Determinants of Health     Financial Resource Strain: Not on file   Food Insecurity: Not on file   Transportation Needs: Not on file   Physical Activity: Inactive Days of Exercise per Week: 0 days    Minutes of Exercise per Session: 0 min   Stress: Not on file   Social Connections: Not on file   Intimate Partner Violence: Not At Risk    Fear of Current or Ex-Partner: No    Emotionally Abused: No    Physically Abused: No    Sexually Abused: No   Housing Stability: Not on file       Past Surgical History:   Procedure Laterality Date    [facility] KNEE ARTHROSCOPY/SURGERY Left     NEUROLOGICAL PROCEDURE UNLISTED      spinal fusion X10 Lumbar region       Family History:  Non-contributory. PE:  Visit Vitals  BP (!) 160/84 Comment: retake   Pulse 81   Temp 97.5 °F (36.4 °C)   Resp 18   Ht 5' 11\" (1.803 m)   Wt 250 lb 9.6 oz (113.7 kg)   SpO2 99%   BMI 34.95 kg/m²     A&O X3, NAD, well develop, well nourished  Heart: S1-S2, rrr  Lungs: CTA bilat  Abd: soft, nt, nt, + bs in all quadrants  Ext:  Pos distal pulses to DP, PT      X-ray: Radiographs of the left knee done 12/12/2022 at the [facility] location including AP, lateral, skyline, 3 for standing views confirms end-stage arthritis of bone to bone eburnation and malalignment. Labs: labs were reviewed and wnl. Ua neg    A:  Left  knee end stage OA    P:  At this point we will move forward with surgery. Again, the alternatives, risks, complications, as well as expected outcome were discussed and the patient wishes to proceed with surgery. Pt has been instructed to stop aspirin, nsaids, rheumatologic medications and blood thinners. They have also been instructed to continue on any heart and bp meds and to take them the morning of surgery with sips of water. The patient was counseled at length about the risks of caridad Covid-19 during their perioperative period and any recovery window from their procedure. The patient was made aware that caridad Covid-19  may worsen their prognosis for recovering from their procedure and lend to a higher morbidity and/or mortality risk.   All material risks, benefits, and reasonable alternatives including postponing the procedure were discussed. The patient does  wish to proceed with the procedure at this time.                 Sharmila Harkins

## 2022-12-28 NOTE — PATIENT INSTRUCTIONS
Patient: Isidro Cardenas                MRN: [id number]       SSN: [id number]  YOB: 1965        AGE: 62 y.o. SEX: male  There is no height or weight on file to calculate BMI.    12/28/22    DO:  1:  Sit with the leg out straight 5-10 min every hour while awake. Keep the toes pointed       up towards the saqib. 2.  Bend your knee 5-10 min every hour. Bend it to the point of pain and hold it for 5-10       Min. 3.  ICE your knee 20 min every hour    4. You can expect to have swelling and discomfort in your thigh down to your knee. Swelling may extend to your foot. You may have bruising from the thigh to the foot as well. Some numbness can be expected to the outside part of the knee. Wear the compression stockings and elevate your leg. DO NOT:    1. Do not place anything under your knee to prop it up  2. Do not sit in the recliner chair.

## 2022-12-28 NOTE — H&P
[address], [address] [facility]  [phone]           HISTORY & PHYSICAL      Patient: Enriqueta Willett                MRN: [id number]       SSN: [id number]  YOB: 1965        AGE: 62 y.o. SEX: male  Body mass index is 34.95 kg/m². PCP: Bhumi Hitchcock MD  12/28/22      CC: left knee end stage OA  Problem List Items Addressed This Visit    None  Visit Diagnoses       Pre-op exam    -  Primary    Primary osteoarthritis of left knee                  HPI:  The patient is a pleasant 62 y.o. whom has end stage OA of their Left knee and has failed conservative treatment including but not limited to NSAIDS, cortisone injections, viscosupplementation, PT, and pain medicine. Due to the current findings and affected activities of daily living, surgical intervention is indicated. The alternatives, risks, complications, as well as expected outcome were discussed. These include but are not limited to infection, blood loss, need for blood transfusion, neurovascular damage, DVT, PE,  post-op stiffness and pain, leg length discrepancy, dislocation, anesthetic complications, prothesis longevity, need for more surgery, MI, stroke, and even death. The patient understands and wishes to proceed with surgery. Past Medical History:   Diagnosis Date    Chronic back pain     Diabetes ([address]     Hypertension     Unspecified sleep apnea     Use CPAP         Current Outpatient Medications:     levothyroxine (SYNTHROID) 175 mcg tablet, Take 175 mcg by mouth Daily (before breakfast). , Disp: , Rfl:     amLODIPine (NORVASC) 10 mg tablet, Take  by mouth daily. , Disp: , Rfl:     atorvastatin (LIPITOR) 10 mg tablet, Take  by mouth daily. , Disp: , Rfl:     hydroCHLOROthiazide (HYDRODIURIL) 25 mg tablet, Take 25 mg by mouth daily. , Disp: , Rfl:     insulin glargine (LANTUS,BASAGLAR) 100 unit/mL (3 mL) inpn, 50 Units by SubCUTAneous route daily. , Disp: , Rfl:     losartan (COZAAR) 100 mg tablet, Take 100 mg by mouth daily. , Disp: , Rfl:     traMADol (ULTRAM) 50 mg tablet, Take 1 Tab by mouth every six (6) hours as needed for Pain. Max Daily Amount: 200 mg., Disp: 12 Tab, Rfl: 0    cholecalciferol (VITAMIN D3) 25 mcg (1,000 unit) cap, Take  by mouth daily. , Disp: , Rfl:     potassium chloride SA (MICRO-K) 10 mEq capsule, Take 10 mEq by mouth daily. , Disp: , Rfl:     metFORMIN (GLUCOPHAGE) 1,000 mg tablet, Take 1,000 mg by mouth two (2) times daily (with meals). , Disp: , Rfl:     levothyroxine (SYNTHROID) 137 mcg tablet, Take  by mouth Daily (before breakfast). , Disp: , Rfl:     ferrous sulfate 15 mg iron, 75 mg,/mL (DONNA-IN-SOL) 15 mg iron (75 mg)/mL drop drops, Take 15 mg by mouth daily. , Disp: , Rfl:     insulin glargine (LANTUS) 100 unit/mL injection, 50 Units by SubCUTAneous route nightly., Disp: , Rfl:     losartan (COZAAR) 100 mg tablet, Take 100 mg by mouth daily. , Disp: , Rfl:     atorvastatin (LIPITOR) 10 mg tablet, Take 10 mg by mouth daily. , Disp: , Rfl:     amLODIPine (NORVASC) 10 mg tablet, Take 10 mg by mouth daily. , Disp: , Rfl:     hydrochlorothiazide (HYDRODIURIL) 25 mg tablet, Take 25 mg by mouth daily. , Disp: , Rfl:     No Known Allergies    Social History     Socioeconomic History    Marital status:      Spouse name: Not on file    Number of children: Not on file    Years of education: Not on file    Highest education level: Not on file   Occupational History    Not on file   Tobacco Use    Smoking status: Never    Smokeless tobacco: Never   Vaping Use    Vaping Use: Never used   Substance and Sexual Activity    Alcohol use: Yes    Drug use: Never    Sexual activity: Not on file   Other Topics Concern    Not on file   Social History Narrative    Not on file     Social Determinants of Health     Financial Resource Strain: Not on file   Food Insecurity: Not on file   Transportation Needs: Not on file   Physical Activity: Inactive Days of Exercise per Week: 0 days    Minutes of Exercise per Session: 0 min   Stress: Not on file   Social Connections: Not on file   Intimate Partner Violence: Not At Risk    Fear of Current or Ex-Partner: No    Emotionally Abused: No    Physically Abused: No    Sexually Abused: No   Housing Stability: Not on file       Past Surgical History:   Procedure Laterality Date    [facility] KNEE ARTHROSCOPY/SURGERY Left     NEUROLOGICAL PROCEDURE UNLISTED      spinal fusion X10 Lumbar region       Family History:  Non-contributory. PE:  Visit Vitals  BP (!) 160/84 Comment: retake   Pulse 81   Temp 97.5 °F (36.4 °C)   Resp 18   Ht 5' 11\" (1.803 m)   Wt 250 lb 9.6 oz (113.7 kg)   SpO2 99%   BMI 34.95 kg/m²     A&O X3, NAD, well develop, well nourished  Heart: S1-S2, rrr  Lungs: CTA bilat  Abd: soft, nt, nt, + bs in all quadrants  Ext:  Pos distal pulses to DP, PT      X-ray: Radiographs of the left knee done 12/12/2022 at the [facility] location including AP, lateral, skyline, 3 for standing views confirms end-stage arthritis of bone to bone eburnation and malalignment. Labs: labs were reviewed and wnl. Ua neg    A:  Left  knee end stage OA    P:  At this point we will move forward with surgery. Again, the alternatives, risks, complications, as well as expected outcome were discussed and the patient wishes to proceed with surgery. Pt has been instructed to stop aspirin, nsaids, rheumatologic medications and blood thinners. They have also been instructed to continue on any heart and bp meds and to take them the morning of surgery with sips of water. The patient was counseled at length about the risks of caridad Covid-19 during their perioperative period and any recovery window from their procedure. The patient was made aware that caridad Covid-19  may worsen their prognosis for recovering from their procedure and lend to a higher morbidity and/or mortality risk.   All material risks, benefits, and reasonable alternatives including postponing the procedure were discussed. The patient does  wish to proceed with the procedure at this time.                 Kateryna Perera

## 2023-01-03 ENCOUNTER — ANESTHESIA EVENT (OUTPATIENT)
Dept: SURGERY | Age: 58
End: 2023-01-03
Payer: OTHER GOVERNMENT

## 2023-01-04 ENCOUNTER — ANESTHESIA (OUTPATIENT)
Dept: SURGERY | Age: 58
End: 2023-01-04
Payer: OTHER GOVERNMENT

## 2023-01-04 DIAGNOSIS — Z01.818 PREOPERATIVE TESTING: Primary | ICD-10-CM

## 2023-01-09 ENCOUNTER — HOSPITAL ENCOUNTER (OUTPATIENT)
Dept: PREADMISSION TESTING | Age: 58
Discharge: HOME | End: 2023-01-09
Payer: OTHER GOVERNMENT

## 2023-01-09 DIAGNOSIS — Z01.818 PREOPERATIVE TESTING: ICD-10-CM

## 2023-01-09 LAB
ABO + RH BLD: NORMAL
BLOOD GROUP ANTIBODIES SERPL: NORMAL
SPECIMEN EXP DATE BLD: NORMAL

## 2023-01-09 PROCEDURE — 36415 COLL VENOUS BLD VENIPUNCTURE: CPT

## 2023-01-09 PROCEDURE — 86900 BLOOD TYPING SEROLOGIC ABO: CPT

## 2023-01-11 NOTE — PATIENT DISCUSSION
MONITOR: Patient has narrow angle(s) but these are not deemed to be closeable at this time. However, angle closure is not always predictable and the signs and symptoms of this were discussed. The patient was asked to follow up with repeat gonioscopy as scheduled. What Type Of Note Output Would You Prefer (Optional)?: Bullet Format How Severe Is Your Skin Lesion?: moderate Has Your Skin Lesion Been Treated?: not been treated Is This A New Presentation, Or A Follow-Up?: Skin Lesion Additional History: Patient has a cold sore and is on Valtrex TID. The patient’s sister’s wedding is next weekend and she’s just trying to look her best.

## 2023-01-11 NOTE — PATIENT DISCUSSION
injected 1/11/22 Recommend 40 units of Daxxify to Glabella. Patient elects Daxxify injection. 60units discarded, 40 units used. (discussed risks and benefits of Daxxify. ..). Lot #[id number], 02/2024 exp.

## 2023-01-13 NOTE — NURSE NAVIGATOR
Call placed to patient, ID verified x 2. Patient  has decided with their surgeon to have a total knee replacement  to decrease  pain and improve mobility . Topics discussed included surgery preparation, what to expect the day of surgery, medications, physical and occupational therapy, and discharge planning. It was discussed that this is considered an elective surgery and that prior to the surgery  decisions such as arranging for help at home once they are discharged needs to be made. Patient agreed to get home ready for surgery and to have a ride arranged to go home. He has all of his required DME, he identifies his wife as his support system, and would like to go home day of surgery if he can pass physical therapy safely on 2 surgical.  Instructions were given for CHG bathing. Patient will complete the procedure the morning of surgery. He denies any rashes, bruises or open areas to his skin at this time. Patient was reminded not to apply any deoderant,  or lotions to skin the morning of surgery. He will remain NPO after midnight the night before surgery and will take only the medications as instructed to take by his surgeon the morning of surgery with a sip of water. Patient verbalized that he will take his blood pressure medications the morning of surgery with a sip of water. A total knee replacement education book will be provided to patient post op prior to discharge. Education regarding the importance of early and frequent ambulation to avoid surgical complications and to assist with pain was provided. Recommended the use of ice to assist with pain and swelling post op for 20 minutes an hour, not to be placed directly on her skin. Patient verbalized understanding of all information provided. Opportunity was given to ask questions and phone number of the Orthopaedic   was given for any questions or concerns that may arise later.  Patient was instructed to bring his CPAP machine in the event that he spends the evening.

## 2023-01-16 ENCOUNTER — APPOINTMENT (OUTPATIENT)
Dept: GENERAL RADIOLOGY | Age: 58
End: 2023-01-16
Attending: ORTHOPAEDIC SURGERY
Payer: OTHER GOVERNMENT

## 2023-01-16 ENCOUNTER — HOSPITAL ENCOUNTER (OUTPATIENT)
Age: 58
Setting detail: OBSERVATION
Discharge: HOME HEALTH | End: 2023-01-17
Attending: ORTHOPAEDIC SURGERY | Admitting: ORTHOPAEDIC SURGERY
Payer: OTHER GOVERNMENT

## 2023-01-16 ENCOUNTER — APPOINTMENT (OUTPATIENT)
Dept: GENERAL RADIOLOGY | Age: 58
End: 2023-01-16
Attending: INTERNAL MEDICINE
Payer: OTHER GOVERNMENT

## 2023-01-16 DIAGNOSIS — M17.10 ARTHRITIS OF KNEE: Primary | ICD-10-CM

## 2023-01-16 DIAGNOSIS — M17.10 ARTHRITIS OF KNEE: ICD-10-CM

## 2023-01-16 LAB
ARTERIAL PATENCY WRIST A: POSITIVE
BASE DEFICIT BLD-SCNC: 3.2 MMOL/L
BASOPHILS # BLD: 0 K/UL (ref 0–0.1)
BASOPHILS NFR BLD: 0 % (ref 0–2)
BDY SITE: ABNORMAL
BNP SERPL-MCNC: 24 PG/ML (ref 0–900)
D DIMER PPP FEU-MCNC: 2.21 UG/ML(FEU)
DIFFERENTIAL METHOD BLD: ABNORMAL
EOSINOPHIL # BLD: 0 K/UL (ref 0–0.4)
EOSINOPHIL NFR BLD: 0 % (ref 0–5)
ERYTHROCYTE [DISTWIDTH] IN BLOOD BY AUTOMATED COUNT: 13.1 % (ref 11.6–14.5)
GAS FLOW.O2 O2 DELIVERY SYS: ABNORMAL
GAS FLOW.O2 SETTING OXYMISER: 16 BPM
GLUCOSE BLD STRIP.AUTO-MCNC: 141 MG/DL (ref 70–110)
GLUCOSE BLD STRIP.AUTO-MCNC: 168 MG/DL (ref 70–110)
GLUCOSE BLD STRIP.AUTO-MCNC: 217 MG/DL (ref 70–110)
GLUCOSE BLD STRIP.AUTO-MCNC: 229 MG/DL (ref 70–110)
HCO3 BLD-SCNC: 21.2 MMOL/L (ref 22–26)
HCT VFR BLD AUTO: 41.6 % (ref 36–48)
HGB BLD-MCNC: 13.8 G/DL (ref 13–16)
IMM GRANULOCYTES # BLD AUTO: 0.1 K/UL (ref 0–0.04)
IMM GRANULOCYTES NFR BLD AUTO: 0 % (ref 0–0.5)
LYMPHOCYTES # BLD: 0.8 K/UL (ref 0.9–3.6)
LYMPHOCYTES NFR BLD: 5 % (ref 21–52)
MCH RBC QN AUTO: 30.6 PG (ref 24–34)
MCHC RBC AUTO-ENTMCNC: 33.2 G/DL (ref 31–37)
MCV RBC AUTO: 92.2 FL (ref 78–100)
MONOCYTES # BLD: 0.6 K/UL (ref 0.05–1.2)
MONOCYTES NFR BLD: 4 % (ref 3–10)
NEUTS SEG # BLD: 14.9 K/UL (ref 1.8–8)
NEUTS SEG NFR BLD: 91 % (ref 40–73)
NRBC # BLD: 0 K/UL (ref 0–0.01)
NRBC BLD-RTO: 0 PER 100 WBC
PCO2 BLD: 35.2 MMHG (ref 35–45)
PH BLD: 7.39 (ref 7.35–7.45)
PLATELET # BLD AUTO: 253 K/UL (ref 135–420)
PMV BLD AUTO: 11.7 FL (ref 9.2–11.8)
PO2 BLD: 58 MMHG (ref 80–100)
RBC # BLD AUTO: 4.51 M/UL (ref 4.35–5.65)
SAO2 % BLD: 89.7 % (ref 92–97)
SERVICE CMNT-IMP: ABNORMAL
SPECIMEN TYPE: ABNORMAL
TSH SERPL DL<=0.05 MIU/L-ACNC: 2.26 UIU/ML (ref 0.36–3.74)
WBC # BLD AUTO: 16.4 K/UL (ref 4.6–13.2)

## 2023-01-16 PROCEDURE — 77030013708 HC HNDPC SUC IRR PULS STRY –B: Performed by: ORTHOPAEDIC SURGERY

## 2023-01-16 PROCEDURE — 01402 ANES OPN/ARTH TOT KNE ARTHRP: CPT | Performed by: ANESTHESIOLOGY

## 2023-01-16 PROCEDURE — 77030003028 HC SUT VCRL J&J -A: Performed by: ORTHOPAEDIC SURGERY

## 2023-01-16 PROCEDURE — G0378 HOSPITAL OBSERVATION PER HR: HCPCS

## 2023-01-16 PROCEDURE — 77010033678 HC OXYGEN DAILY

## 2023-01-16 PROCEDURE — C1776 JOINT DEVICE (IMPLANTABLE): HCPCS | Performed by: ORTHOPAEDIC SURGERY

## 2023-01-16 PROCEDURE — 74011000258 HC RX REV CODE- 258: Performed by: ORTHOPAEDIC SURGERY

## 2023-01-16 PROCEDURE — 74011000250 HC RX REV CODE- 250: Performed by: NURSE ANESTHETIST, CERTIFIED REGISTERED

## 2023-01-16 PROCEDURE — 74011000250 HC RX REV CODE- 250: Performed by: PHYSICIAN ASSISTANT

## 2023-01-16 PROCEDURE — 64447 NJX AA&/STRD FEMORAL NRV IMG: CPT | Performed by: ANESTHESIOLOGY

## 2023-01-16 PROCEDURE — 77030006835 HC BLD SAW SAG STRY -B: Performed by: ORTHOPAEDIC SURGERY

## 2023-01-16 PROCEDURE — 77030002933 HC SUT MCRYL J&J -A: Performed by: ORTHOPAEDIC SURGERY

## 2023-01-16 PROCEDURE — C1713 ANCHOR/SCREW BN/BN,TIS/BN: HCPCS | Performed by: ORTHOPAEDIC SURGERY

## 2023-01-16 PROCEDURE — 36600 WITHDRAWAL OF ARTERIAL BLOOD: CPT

## 2023-01-16 PROCEDURE — 74011250636 HC RX REV CODE- 250/636: Performed by: NURSE ANESTHETIST, CERTIFIED REGISTERED

## 2023-01-16 PROCEDURE — 76010000131 HC OR TIME 2 TO 2.5 HR: Performed by: ORTHOPAEDIC SURGERY

## 2023-01-16 PROCEDURE — 84443 ASSAY THYROID STIM HORMONE: CPT

## 2023-01-16 PROCEDURE — 77030019605: Performed by: ORTHOPAEDIC SURGERY

## 2023-01-16 PROCEDURE — 77030008462 HC STPLR SKN PROX J&J -A: Performed by: ORTHOPAEDIC SURGERY

## 2023-01-16 PROCEDURE — 74011636637 HC RX REV CODE- 636/637: Performed by: ANESTHESIOLOGY

## 2023-01-16 PROCEDURE — 77030031139 HC SUT VCRL2 J&J -A: Performed by: ORTHOPAEDIC SURGERY

## 2023-01-16 PROCEDURE — 2709999900 HC NON-CHARGEABLE SUPPLY: Performed by: ORTHOPAEDIC SURGERY

## 2023-01-16 PROCEDURE — 36415 COLL VENOUS BLD VENIPUNCTURE: CPT

## 2023-01-16 PROCEDURE — 74011250636 HC RX REV CODE- 250/636: Performed by: ANESTHESIOLOGY

## 2023-01-16 PROCEDURE — 73560 X-RAY EXAM OF KNEE 1 OR 2: CPT

## 2023-01-16 PROCEDURE — 77030010785: Performed by: ORTHOPAEDIC SURGERY

## 2023-01-16 PROCEDURE — 77030038692 HC WND DEB SYS IRMX -B: Performed by: ORTHOPAEDIC SURGERY

## 2023-01-16 PROCEDURE — 74011636637 HC RX REV CODE- 636/637: Performed by: ORTHOPAEDIC SURGERY

## 2023-01-16 PROCEDURE — 01402 ANES OPN/ARTH TOT KNE ARTHRP: CPT | Performed by: NURSE ANESTHETIST, CERTIFIED REGISTERED

## 2023-01-16 PROCEDURE — 99223 1ST HOSP IP/OBS HIGH 75: CPT | Performed by: INTERNAL MEDICINE

## 2023-01-16 PROCEDURE — 77030020274 HC MISC IMPL ORTHOPEDIC: Performed by: ORTHOPAEDIC SURGERY

## 2023-01-16 PROCEDURE — 77030028925 HC PIN/DRL SET HUM S&N -C: Performed by: ORTHOPAEDIC SURGERY

## 2023-01-16 PROCEDURE — 76942 ECHO GUIDE FOR BIOPSY: CPT | Performed by: ANESTHESIOLOGY

## 2023-01-16 PROCEDURE — 77030018836 HC SOL IRR NACL ICUM -A: Performed by: ORTHOPAEDIC SURGERY

## 2023-01-16 PROCEDURE — 74011250636 HC RX REV CODE- 250/636: Performed by: ORTHOPAEDIC SURGERY

## 2023-01-16 PROCEDURE — 74011000250 HC RX REV CODE- 250: Performed by: ORTHOPAEDIC SURGERY

## 2023-01-16 PROCEDURE — 77030019557 HC ELECTRD VES SEAL MEDT -F: Performed by: ORTHOPAEDIC SURGERY

## 2023-01-16 PROCEDURE — 77030040922 HC BLNKT HYPOTHRM STRY -A: Performed by: ORTHOPAEDIC SURGERY

## 2023-01-16 PROCEDURE — 74011250637 HC RX REV CODE- 250/637: Performed by: NURSE ANESTHETIST, CERTIFIED REGISTERED

## 2023-01-16 PROCEDURE — 74011250637 HC RX REV CODE- 250/637: Performed by: ORTHOPAEDIC SURGERY

## 2023-01-16 PROCEDURE — 74011250637 HC RX REV CODE- 250/637: Performed by: PHYSICIAN ASSISTANT

## 2023-01-16 PROCEDURE — 77030000032 HC CUF TRNQT ZIMM -B: Performed by: ORTHOPAEDIC SURGERY

## 2023-01-16 PROCEDURE — 85025 COMPLETE CBC W/AUTO DIFF WBC: CPT

## 2023-01-16 PROCEDURE — 74011250636 HC RX REV CODE- 250/636: Performed by: INTERNAL MEDICINE

## 2023-01-16 PROCEDURE — 74011250636 HC RX REV CODE- 250/636: Performed by: PHYSICIAN ASSISTANT

## 2023-01-16 PROCEDURE — 77030012890: Performed by: ORTHOPAEDIC SURGERY

## 2023-01-16 PROCEDURE — 76210000002 HC OR PH I REC 3 TO 3.5 HR: Performed by: ORTHOPAEDIC SURGERY

## 2023-01-16 PROCEDURE — 82962 GLUCOSE BLOOD TEST: CPT

## 2023-01-16 PROCEDURE — 77030003666 HC NDL SPINAL BD -A: Performed by: ORTHOPAEDIC SURGERY

## 2023-01-16 PROCEDURE — C9290 INJ, BUPIVACAINE LIPOSOME: HCPCS | Performed by: ORTHOPAEDIC SURGERY

## 2023-01-16 PROCEDURE — 83880 ASSAY OF NATRIURETIC PEPTIDE: CPT

## 2023-01-16 PROCEDURE — 64450 NJX AA&/STRD OTHER PN/BRANCH: CPT | Performed by: ANESTHESIOLOGY

## 2023-01-16 PROCEDURE — 97161 PT EVAL LOW COMPLEX 20 MIN: CPT

## 2023-01-16 PROCEDURE — 82803 BLOOD GASES ANY COMBINATION: CPT

## 2023-01-16 PROCEDURE — 77030008683 HC TU ET CUF COVD -A: Performed by: ANESTHESIOLOGY

## 2023-01-16 PROCEDURE — 76060000035 HC ANESTHESIA 2 TO 2.5 HR: Performed by: ORTHOPAEDIC SURGERY

## 2023-01-16 PROCEDURE — 85379 FIBRIN DEGRADATION QUANT: CPT

## 2023-01-16 PROCEDURE — 74011250636 HC RX REV CODE- 250/636

## 2023-01-16 PROCEDURE — 71045 X-RAY EXAM CHEST 1 VIEW: CPT

## 2023-01-16 DEVICE — GENESIS II NON-POROUS TIBIAL                                    BASEPLATE SIZE 6 LT
Type: IMPLANTABLE DEVICE | Site: KNEE | Status: FUNCTIONAL
Brand: GENESIS II

## 2023-01-16 DEVICE — IMPLANT PATELLAR DIA38MM THK11MM X3 ASYMMETRIC TRIATHLON: Type: IMPLANTABLE DEVICE | Site: KNEE | Status: FUNCTIONAL

## 2023-01-16 DEVICE — CEMENT BNE 20ML 41GM FULL DOSE PMMA W/ TOBRA M VISC RADPQ: Type: IMPLANTABLE DEVICE | Site: KNEE | Status: FUNCTIONAL

## 2023-01-16 DEVICE — LEGION PS HIGH FLEX XLPE SZ 5-6 10MM
Type: IMPLANTABLE DEVICE | Site: KNEE | Status: FUNCTIONAL
Brand: LEGION

## 2023-01-16 DEVICE — GENESIS TROCHLEAR PIN 1/8 X 3
Type: IMPLANTABLE DEVICE | Site: KNEE | Status: FUNCTIONAL
Brand: GENESIS

## 2023-01-16 DEVICE — LEGION PS OXINIUM FEMORAL SZ 7 LEFT
Type: IMPLANTABLE DEVICE | Site: KNEE | Status: FUNCTIONAL
Brand: LEGION

## 2023-01-16 DEVICE — KNEE K1 TOT HEMI STD CEM IMPL CAPPED K1 SN: Type: IMPLANTABLE DEVICE | Site: KNEE | Status: FUNCTIONAL

## 2023-01-16 RX ORDER — SODIUM CHLORIDE 0.9 % (FLUSH) 0.9 %
5-40 SYRINGE (ML) INJECTION EVERY 8 HOURS
Status: DISCONTINUED | OUTPATIENT
Start: 2023-01-16 | End: 2023-01-17 | Stop reason: HOSPADM

## 2023-01-16 RX ORDER — OXYCODONE AND ACETAMINOPHEN 7.5; 325 MG/1; MG/1
1-2 TABLET ORAL
Qty: 56 TABLET | Refills: 0 | Status: SHIPPED | OUTPATIENT
Start: 2023-01-16 | End: 2023-01-23

## 2023-01-16 RX ORDER — SODIUM CHLORIDE, SODIUM LACTATE, POTASSIUM CHLORIDE, CALCIUM CHLORIDE 600; 310; 30; 20 MG/100ML; MG/100ML; MG/100ML; MG/100ML
75 INJECTION, SOLUTION INTRAVENOUS CONTINUOUS
Status: DISCONTINUED | OUTPATIENT
Start: 2023-01-16 | End: 2023-01-16 | Stop reason: HOSPADM

## 2023-01-16 RX ORDER — CELECOXIB 400 MG/1
400 CAPSULE ORAL ONCE
Status: COMPLETED | OUTPATIENT
Start: 2023-01-16 | End: 2023-01-16

## 2023-01-16 RX ORDER — FAMOTIDINE 20 MG/1
20 TABLET, FILM COATED ORAL ONCE
Status: COMPLETED | OUTPATIENT
Start: 2023-01-16 | End: 2023-01-16

## 2023-01-16 RX ORDER — NEOSTIGMINE METHYLSULFATE 1 MG/ML
INJECTION, SOLUTION INTRAVENOUS AS NEEDED
Status: DISCONTINUED | OUTPATIENT
Start: 2023-01-16 | End: 2023-01-16 | Stop reason: HOSPADM

## 2023-01-16 RX ORDER — FENTANYL CITRATE 50 UG/ML
25 INJECTION, SOLUTION INTRAMUSCULAR; INTRAVENOUS AS NEEDED
Status: DISCONTINUED | OUTPATIENT
Start: 2023-01-16 | End: 2023-01-16 | Stop reason: HOSPADM

## 2023-01-16 RX ORDER — LANOLIN ALCOHOL/MO/W.PET/CERES
1 CREAM (GRAM) TOPICAL 2 TIMES DAILY WITH MEALS
Status: DISCONTINUED | OUTPATIENT
Start: 2023-01-16 | End: 2023-01-17 | Stop reason: HOSPADM

## 2023-01-16 RX ORDER — MIDAZOLAM HYDROCHLORIDE 1 MG/ML
2 INJECTION, SOLUTION INTRAMUSCULAR; INTRAVENOUS ONCE
Status: COMPLETED | OUTPATIENT
Start: 2023-01-16 | End: 2023-01-16

## 2023-01-16 RX ORDER — NALOXONE HYDROCHLORIDE 0.4 MG/ML
INJECTION, SOLUTION INTRAMUSCULAR; INTRAVENOUS; SUBCUTANEOUS AS NEEDED
Status: DISCONTINUED | OUTPATIENT
Start: 2023-01-16 | End: 2023-01-16 | Stop reason: HOSPADM

## 2023-01-16 RX ORDER — LOSARTAN POTASSIUM 50 MG/1
100 TABLET ORAL DAILY
Status: DISCONTINUED | OUTPATIENT
Start: 2023-01-17 | End: 2023-01-17 | Stop reason: HOSPADM

## 2023-01-16 RX ORDER — PREGABALIN 75 MG/1
75 CAPSULE ORAL ONCE
Status: COMPLETED | OUTPATIENT
Start: 2023-01-16 | End: 2023-01-16

## 2023-01-16 RX ORDER — CELECOXIB 200 MG/1
200 CAPSULE ORAL 2 TIMES DAILY
Qty: 60 CAPSULE | Refills: 2 | Status: SHIPPED | OUTPATIENT
Start: 2023-01-16 | End: 2023-04-16

## 2023-01-16 RX ORDER — INSULIN LISPRO 100 [IU]/ML
INJECTION, SOLUTION INTRAVENOUS; SUBCUTANEOUS AS NEEDED
Status: DISCONTINUED | OUTPATIENT
Start: 2023-01-16 | End: 2023-01-17 | Stop reason: HOSPADM

## 2023-01-16 RX ORDER — AMLODIPINE BESYLATE 10 MG/1
10 TABLET ORAL DAILY
Status: DISCONTINUED | OUTPATIENT
Start: 2023-01-17 | End: 2023-01-17 | Stop reason: HOSPADM

## 2023-01-16 RX ORDER — ACETAMINOPHEN 500 MG
1000 TABLET ORAL EVERY 6 HOURS
Status: DISCONTINUED | OUTPATIENT
Start: 2023-01-16 | End: 2023-01-17 | Stop reason: HOSPADM

## 2023-01-16 RX ORDER — ROPIVACAINE HYDROCHLORIDE 5 MG/ML
INJECTION, SOLUTION EPIDURAL; INFILTRATION; PERINEURAL
Status: COMPLETED | OUTPATIENT
Start: 2023-01-16 | End: 2023-01-16

## 2023-01-16 RX ORDER — OXYCODONE AND ACETAMINOPHEN 5; 325 MG/1; MG/1
1 TABLET ORAL AS NEEDED
Status: DISCONTINUED | OUTPATIENT
Start: 2023-01-16 | End: 2023-01-16 | Stop reason: HOSPADM

## 2023-01-16 RX ORDER — DOCUSATE SODIUM 100 MG/1
100 CAPSULE, LIQUID FILLED ORAL 2 TIMES DAILY
Qty: 60 CAPSULE | Refills: 2 | Status: SHIPPED | OUTPATIENT
Start: 2023-01-16 | End: 2023-04-16

## 2023-01-16 RX ORDER — LIDOCAINE HYDROCHLORIDE 10 MG/ML
3 INJECTION, SOLUTION EPIDURAL; INFILTRATION; INTRACAUDAL; PERINEURAL ONCE
Status: DISCONTINUED | OUTPATIENT
Start: 2023-01-16 | End: 2023-01-16 | Stop reason: HOSPADM

## 2023-01-16 RX ORDER — SODIUM CHLORIDE 0.9 % (FLUSH) 0.9 %
5-40 SYRINGE (ML) INJECTION AS NEEDED
Status: DISCONTINUED | OUTPATIENT
Start: 2023-01-16 | End: 2023-01-17 | Stop reason: HOSPADM

## 2023-01-16 RX ORDER — CEFADROXIL 500 MG/1
500 CAPSULE ORAL 2 TIMES DAILY
Qty: 14 CAPSULE | Refills: 0 | Status: SHIPPED | OUTPATIENT
Start: 2023-01-16

## 2023-01-16 RX ORDER — ROPIVACAINE HYDROCHLORIDE 5 MG/ML
INJECTION, SOLUTION EPIDURAL; INFILTRATION; PERINEURAL
Status: COMPLETED
Start: 2023-01-16 | End: 2023-01-16

## 2023-01-16 RX ORDER — FLUMAZENIL 0.1 MG/ML
0.2 INJECTION INTRAVENOUS AS NEEDED
Status: DISCONTINUED | OUTPATIENT
Start: 2023-01-16 | End: 2023-01-17 | Stop reason: HOSPADM

## 2023-01-16 RX ORDER — FENTANYL CITRATE 50 UG/ML
50 INJECTION, SOLUTION INTRAMUSCULAR; INTRAVENOUS
Status: DISCONTINUED | OUTPATIENT
Start: 2023-01-16 | End: 2023-01-16 | Stop reason: HOSPADM

## 2023-01-16 RX ORDER — ASPIRIN 81 MG/1
81 TABLET ORAL 2 TIMES DAILY
Status: DISCONTINUED | OUTPATIENT
Start: 2023-01-17 | End: 2023-01-17 | Stop reason: HOSPADM

## 2023-01-16 RX ORDER — ONDANSETRON 2 MG/ML
INJECTION INTRAMUSCULAR; INTRAVENOUS AS NEEDED
Status: DISCONTINUED | OUTPATIENT
Start: 2023-01-16 | End: 2023-01-16 | Stop reason: HOSPADM

## 2023-01-16 RX ORDER — FENTANYL CITRATE 50 UG/ML
INJECTION, SOLUTION INTRAMUSCULAR; INTRAVENOUS AS NEEDED
Status: DISCONTINUED | OUTPATIENT
Start: 2023-01-16 | End: 2023-01-16 | Stop reason: HOSPADM

## 2023-01-16 RX ORDER — DEXTROSE MONOHYDRATE 100 MG/ML
0-250 INJECTION, SOLUTION INTRAVENOUS AS NEEDED
Status: DISCONTINUED | OUTPATIENT
Start: 2023-01-16 | End: 2023-01-17 | Stop reason: HOSPADM

## 2023-01-16 RX ORDER — SODIUM CHLORIDE 0.9 % (FLUSH) 0.9 %
5-40 SYRINGE (ML) INJECTION AS NEEDED
Status: DISCONTINUED | OUTPATIENT
Start: 2023-01-16 | End: 2023-01-16 | Stop reason: HOSPADM

## 2023-01-16 RX ORDER — PROPOFOL 10 MG/ML
INJECTION, EMULSION INTRAVENOUS AS NEEDED
Status: DISCONTINUED | OUTPATIENT
Start: 2023-01-16 | End: 2023-01-16 | Stop reason: HOSPADM

## 2023-01-16 RX ORDER — FENTANYL CITRATE 50 UG/ML
100 INJECTION, SOLUTION INTRAMUSCULAR; INTRAVENOUS ONCE
Status: COMPLETED | OUTPATIENT
Start: 2023-01-16 | End: 2023-01-16

## 2023-01-16 RX ORDER — SODIUM CHLORIDE 9 MG/ML
100 INJECTION, SOLUTION INTRAVENOUS CONTINUOUS
Status: DISCONTINUED | OUTPATIENT
Start: 2023-01-16 | End: 2023-01-17 | Stop reason: HOSPADM

## 2023-01-16 RX ORDER — DEXTROSE MONOHYDRATE 100 MG/ML
0-250 INJECTION, SOLUTION INTRAVENOUS AS NEEDED
Status: DISCONTINUED | OUTPATIENT
Start: 2023-01-16 | End: 2023-01-16 | Stop reason: HOSPADM

## 2023-01-16 RX ORDER — HYDROCHLOROTHIAZIDE 25 MG/1
25 TABLET ORAL DAILY
Status: DISCONTINUED | OUTPATIENT
Start: 2023-01-17 | End: 2023-01-17 | Stop reason: HOSPADM

## 2023-01-16 RX ORDER — SODIUM CHLORIDE, SODIUM LACTATE, POTASSIUM CHLORIDE, CALCIUM CHLORIDE 600; 310; 30; 20 MG/100ML; MG/100ML; MG/100ML; MG/100ML
25 INJECTION, SOLUTION INTRAVENOUS CONTINUOUS
Status: DISCONTINUED | OUTPATIENT
Start: 2023-01-16 | End: 2023-01-16 | Stop reason: HOSPADM

## 2023-01-16 RX ORDER — ROPIVACAINE HYDROCHLORIDE 2 MG/ML
30 INJECTION, SOLUTION EPIDURAL; INFILTRATION; PERINEURAL
Status: DISCONTINUED | OUTPATIENT
Start: 2023-01-16 | End: 2023-01-16 | Stop reason: HOSPADM

## 2023-01-16 RX ORDER — NALOXONE HYDROCHLORIDE 0.4 MG/ML
0.4 INJECTION, SOLUTION INTRAMUSCULAR; INTRAVENOUS; SUBCUTANEOUS AS NEEDED
Status: DISCONTINUED | OUTPATIENT
Start: 2023-01-16 | End: 2023-01-17 | Stop reason: HOSPADM

## 2023-01-16 RX ORDER — AMOXICILLIN 250 MG
1 CAPSULE ORAL 2 TIMES DAILY
Status: DISCONTINUED | OUTPATIENT
Start: 2023-01-16 | End: 2023-01-17 | Stop reason: HOSPADM

## 2023-01-16 RX ORDER — DEXAMETHASONE SODIUM PHOSPHATE 4 MG/ML
INJECTION, SOLUTION INTRA-ARTICULAR; INTRALESIONAL; INTRAMUSCULAR; INTRAVENOUS; SOFT TISSUE
Status: COMPLETED | OUTPATIENT
Start: 2023-01-16 | End: 2023-01-16

## 2023-01-16 RX ORDER — SODIUM CHLORIDE 0.9 % (FLUSH) 0.9 %
5-40 SYRINGE (ML) INJECTION EVERY 8 HOURS
Status: DISCONTINUED | OUTPATIENT
Start: 2023-01-16 | End: 2023-01-16 | Stop reason: HOSPADM

## 2023-01-16 RX ORDER — ONDANSETRON 4 MG/1
4 TABLET, FILM COATED ORAL
Qty: 30 TABLET | Refills: 1 | Status: SHIPPED | OUTPATIENT
Start: 2023-01-16

## 2023-01-16 RX ORDER — ACETAMINOPHEN 500 MG
1000 TABLET ORAL ONCE
Status: COMPLETED | OUTPATIENT
Start: 2023-01-16 | End: 2023-01-16

## 2023-01-16 RX ORDER — SUCCINYLCHOLINE CHLORIDE 20 MG/ML
INJECTION INTRAMUSCULAR; INTRAVENOUS AS NEEDED
Status: DISCONTINUED | OUTPATIENT
Start: 2023-01-16 | End: 2023-01-16 | Stop reason: HOSPADM

## 2023-01-16 RX ORDER — CELECOXIB 100 MG/1
200 CAPSULE ORAL 2 TIMES DAILY
Status: DISCONTINUED | OUTPATIENT
Start: 2023-01-16 | End: 2023-01-17 | Stop reason: HOSPADM

## 2023-01-16 RX ORDER — ONDANSETRON 2 MG/ML
4 INJECTION INTRAMUSCULAR; INTRAVENOUS
Status: DISCONTINUED | OUTPATIENT
Start: 2023-01-16 | End: 2023-01-17 | Stop reason: HOSPADM

## 2023-01-16 RX ORDER — LABETALOL HCL 20 MG/4 ML
SYRINGE (ML) INTRAVENOUS AS NEEDED
Status: DISCONTINUED | OUTPATIENT
Start: 2023-01-16 | End: 2023-01-16 | Stop reason: HOSPADM

## 2023-01-16 RX ORDER — INSULIN LISPRO 100 [IU]/ML
INJECTION, SOLUTION INTRAVENOUS; SUBCUTANEOUS ONCE
Status: DISCONTINUED | OUTPATIENT
Start: 2023-01-16 | End: 2023-01-16 | Stop reason: HOSPADM

## 2023-01-16 RX ORDER — OXYCODONE HYDROCHLORIDE 5 MG/1
10-15 TABLET ORAL
Status: DISCONTINUED | OUTPATIENT
Start: 2023-01-16 | End: 2023-01-17 | Stop reason: HOSPADM

## 2023-01-16 RX ORDER — GLYCOPYRROLATE 0.2 MG/ML
INJECTION INTRAMUSCULAR; INTRAVENOUS AS NEEDED
Status: DISCONTINUED | OUTPATIENT
Start: 2023-01-16 | End: 2023-01-16 | Stop reason: HOSPADM

## 2023-01-16 RX ORDER — KETOROLAC TROMETHAMINE 15 MG/ML
15 INJECTION, SOLUTION INTRAMUSCULAR; INTRAVENOUS EVERY 6 HOURS
Status: DISCONTINUED | OUTPATIENT
Start: 2023-01-16 | End: 2023-01-17 | Stop reason: HOSPADM

## 2023-01-16 RX ORDER — IBUPROFEN 200 MG
4 TABLET ORAL AS NEEDED
Status: DISCONTINUED | OUTPATIENT
Start: 2023-01-16 | End: 2023-01-16 | Stop reason: HOSPADM

## 2023-01-16 RX ORDER — FUROSEMIDE 10 MG/ML
40 INJECTION INTRAMUSCULAR; INTRAVENOUS 2 TIMES DAILY
Status: DISCONTINUED | OUTPATIENT
Start: 2023-01-16 | End: 2023-01-17 | Stop reason: HOSPADM

## 2023-01-16 RX ORDER — ROPIVACAINE HYDROCHLORIDE 2 MG/ML
INJECTION, SOLUTION EPIDURAL; INFILTRATION; PERINEURAL
Status: DISCONTINUED | OUTPATIENT
Start: 2023-01-16 | End: 2023-01-16

## 2023-01-16 RX ORDER — INSULIN LISPRO 100 [IU]/ML
INJECTION, SOLUTION INTRAVENOUS; SUBCUTANEOUS
Status: DISCONTINUED | OUTPATIENT
Start: 2023-01-16 | End: 2023-01-17 | Stop reason: HOSPADM

## 2023-01-16 RX ORDER — LIDOCAINE HYDROCHLORIDE 20 MG/ML
INJECTION, SOLUTION EPIDURAL; INFILTRATION; INTRACAUDAL; PERINEURAL AS NEEDED
Status: DISCONTINUED | OUTPATIENT
Start: 2023-01-16 | End: 2023-01-16 | Stop reason: HOSPADM

## 2023-01-16 RX ORDER — IBUPROFEN 200 MG
4 TABLET ORAL AS NEEDED
Status: DISCONTINUED | OUTPATIENT
Start: 2023-01-16 | End: 2023-01-17 | Stop reason: HOSPADM

## 2023-01-16 RX ORDER — IBUPROFEN 200 MG
16 TABLET ORAL AS NEEDED
Status: DISCONTINUED | OUTPATIENT
Start: 2023-01-16 | End: 2023-01-17 | Stop reason: HOSPADM

## 2023-01-16 RX ORDER — ROCURONIUM BROMIDE 10 MG/ML
INJECTION, SOLUTION INTRAVENOUS AS NEEDED
Status: DISCONTINUED | OUTPATIENT
Start: 2023-01-16 | End: 2023-01-16 | Stop reason: HOSPADM

## 2023-01-16 RX ORDER — ASPIRIN 81 MG/1
81 TABLET ORAL 2 TIMES DAILY
Qty: 60 TABLET | Refills: 1 | Status: SHIPPED | OUTPATIENT
Start: 2023-01-16

## 2023-01-16 RX ORDER — PREGABALIN 25 MG/1
25 CAPSULE ORAL 2 TIMES DAILY
Status: DISCONTINUED | OUTPATIENT
Start: 2023-01-16 | End: 2023-01-17 | Stop reason: HOSPADM

## 2023-01-16 RX ADMIN — PROPOFOL 200 MG: 10 INJECTION, EMULSION INTRAVENOUS at 10:14

## 2023-01-16 RX ADMIN — ACETAMINOPHEN 1000 MG: 500 TABLET ORAL at 08:52

## 2023-01-16 RX ADMIN — GLYCOPYRROLATE 0.4 MG: 0.2 INJECTION INTRAMUSCULAR; INTRAVENOUS at 11:43

## 2023-01-16 RX ADMIN — KETOROLAC TROMETHAMINE 15 MG: 15 INJECTION, SOLUTION INTRAMUSCULAR; INTRAVENOUS at 18:06

## 2023-01-16 RX ADMIN — ROCURONIUM BROMIDE 50 MG: 50 INJECTION INTRAVENOUS at 10:18

## 2023-01-16 RX ADMIN — NALOXONE HYDROCHLORIDE 0.4 MG: 0.4 INJECTION, SOLUTION INTRAMUSCULAR; INTRAVENOUS; SUBCUTANEOUS at 12:12

## 2023-01-16 RX ADMIN — CEFAZOLIN 3 G: 1 INJECTION, POWDER, FOR SOLUTION INTRAMUSCULAR; INTRAVENOUS at 18:06

## 2023-01-16 RX ADMIN — FERROUS SULFATE TAB 325 MG (65 MG ELEMENTAL FE) 325 MG: 325 (65 FE) TAB at 18:05

## 2023-01-16 RX ADMIN — LIDOCAINE HYDROCHLORIDE 40 MG: 20 INJECTION, SOLUTION EPIDURAL; INFILTRATION; INTRACAUDAL; PERINEURAL at 10:14

## 2023-01-16 RX ADMIN — FUROSEMIDE 40 MG: 10 INJECTION, SOLUTION INTRAMUSCULAR; INTRAVENOUS at 18:24

## 2023-01-16 RX ADMIN — LABETALOL 20 MG/4 ML (5 MG/ML) INTRAVENOUS SYRINGE 10 MG: at 10:47

## 2023-01-16 RX ADMIN — TRANEXAMIC ACID 1 G: 100 INJECTION, SOLUTION INTRAVENOUS at 11:42

## 2023-01-16 RX ADMIN — CELECOXIB 200 MG: 100 CAPSULE ORAL at 18:05

## 2023-01-16 RX ADMIN — SENNOSIDES AND DOCUSATE SODIUM 1 TABLET: 50; 8.6 TABLET ORAL at 18:05

## 2023-01-16 RX ADMIN — FENTANYL CITRATE 50 MCG: 50 INJECTION, SOLUTION INTRAMUSCULAR; INTRAVENOUS at 09:54

## 2023-01-16 RX ADMIN — Medication 3 UNITS: at 13:38

## 2023-01-16 RX ADMIN — FENTANYL CITRATE 100 MCG: 50 INJECTION, SOLUTION INTRAMUSCULAR; INTRAVENOUS at 10:14

## 2023-01-16 RX ADMIN — FENTANYL CITRATE 100 MCG: 50 INJECTION, SOLUTION INTRAMUSCULAR; INTRAVENOUS at 10:41

## 2023-01-16 RX ADMIN — LABETALOL 20 MG/4 ML (5 MG/ML) INTRAVENOUS SYRINGE 10 MG: at 11:02

## 2023-01-16 RX ADMIN — TRANEXAMIC ACID 1 G: 100 INJECTION, SOLUTION INTRAVENOUS at 10:20

## 2023-01-16 RX ADMIN — MIDAZOLAM 1 MG: 1 INJECTION INTRAMUSCULAR; INTRAVENOUS at 09:54

## 2023-01-16 RX ADMIN — ROPIVACAINE HYDROCHLORIDE 20 ML: 5 INJECTION, SOLUTION EPIDURAL; INFILTRATION; PERINEURAL at 10:06

## 2023-01-16 RX ADMIN — KETOROLAC TROMETHAMINE 15 MG: 15 INJECTION, SOLUTION INTRAMUSCULAR; INTRAVENOUS at 23:26

## 2023-01-16 RX ADMIN — PREGABALIN 25 MG: 25 CAPSULE ORAL at 18:05

## 2023-01-16 RX ADMIN — ONDANSETRON 4 MG: 2 INJECTION INTRAMUSCULAR; INTRAVENOUS at 11:43

## 2023-01-16 RX ADMIN — NEOSTIGMINE METHYLSULFATE 3 MG: 1 INJECTION, SOLUTION INTRAVENOUS at 11:43

## 2023-01-16 RX ADMIN — Medication 4 UNITS: at 18:24

## 2023-01-16 RX ADMIN — CEFAZOLIN SODIUM 3 G: 1 INJECTION, POWDER, FOR SOLUTION INTRAMUSCULAR; INTRAVENOUS at 10:18

## 2023-01-16 RX ADMIN — Medication 4 UNITS: at 21:13

## 2023-01-16 RX ADMIN — CELECOXIB 400 MG: 400 CAPSULE ORAL at 08:53

## 2023-01-16 RX ADMIN — SUGAMMADEX 200 MG: 100 INJECTION, SOLUTION INTRAVENOUS at 12:08

## 2023-01-16 RX ADMIN — ACETAMINOPHEN 1000 MG: 500 TABLET ORAL at 23:26

## 2023-01-16 RX ADMIN — FAMOTIDINE 20 MG: 20 TABLET, FILM COATED ORAL at 08:53

## 2023-01-16 RX ADMIN — SUCCINYLCHOLINE CHLORIDE 100 MG: 20 INJECTION, SOLUTION INTRAMUSCULAR; INTRAVENOUS at 10:14

## 2023-01-16 RX ADMIN — SODIUM CHLORIDE, PRESERVATIVE FREE 10 ML: 5 INJECTION INTRAVENOUS at 21:35

## 2023-01-16 RX ADMIN — PREGABALIN 75 MG: 75 CAPSULE ORAL at 08:53

## 2023-01-16 RX ADMIN — ROPIVACAINE HYDROCHLORIDE 20 ML: 5 INJECTION EPIDURAL; INFILTRATION; PERINEURAL at 09:57

## 2023-01-16 RX ADMIN — ACETAMINOPHEN 1000 MG: 500 TABLET ORAL at 18:05

## 2023-01-16 RX ADMIN — DEXAMETHASONE SODIUM PHOSPHATE 8 MG: 4 INJECTION, SOLUTION INTRAMUSCULAR; INTRAVENOUS at 10:00

## 2023-01-16 RX ADMIN — OXYCODONE HYDROCHLORIDE 10 MG: 5 TABLET ORAL at 21:00

## 2023-01-16 RX ADMIN — FENTANYL CITRATE 100 MCG: 50 INJECTION, SOLUTION INTRAMUSCULAR; INTRAVENOUS at 10:27

## 2023-01-16 RX ADMIN — SODIUM CHLORIDE, PRESERVATIVE FREE 10 ML: 5 INJECTION INTRAVENOUS at 18:16

## 2023-01-16 RX ADMIN — SODIUM CHLORIDE, POTASSIUM CHLORIDE, SODIUM LACTATE AND CALCIUM CHLORIDE 75 ML/HR: 600; 310; 30; 20 INJECTION, SOLUTION INTRAVENOUS at 08:53

## 2023-01-16 NOTE — ROUTINE PROCESS
TRANSFER - OUT REPORT:    Verbal report given to Cece Chiang RN(name) on Doris Mancia  being transferred to 2 Surgical (Room 2214)(unit) for routine post - op       Report consisted of patients Situation, Background, Assessment and   Recommendations(SBAR). Information from the following report(s) SBAR, Procedure Summary, MAR, Recent Results, and Cardiac Rhythm NSR  was reviewed with the receiving nurse. Lines:   Peripheral IV 01/16/23 Right Antecubital (Active)   Site Assessment Clean, dry, & intact 01/16/23 1340   Phlebitis Assessment 0 01/16/23 1340   Infiltration Assessment 0 01/16/23 1340   Dressing Status Clean, dry, & intact 01/16/23 1340   Dressing Type Tape;Transparent 01/16/23 1340   Hub Color/Line Status Pink; Infusing;Patent 01/16/23 1340        Opportunity for questions and clarification was provided.       Patient transported with:   O2 @ 2 liters  Tech

## 2023-01-16 NOTE — ANESTHESIA POSTPROCEDURE EVALUATION
Procedure(s):  LEFT TOTAL KNEE ARTHROPLASTY. general    Anesthesia Post Evaluation      Multimodal analgesia: multimodal analgesia used between 6 hours prior to anesthesia start to PACU discharge  Patient location during evaluation: bedside  Patient participation: complete - patient participated  Level of consciousness: awake  Pain management: adequate  Airway patency: patent  Anesthetic complications: no  Cardiovascular status: stable  Respiratory status: acceptable  Hydration status: acceptable  Post anesthesia nausea and vomiting:  controlled      INITIAL Post-op Vital signs:   Vitals Value Taken Time   /80 01/16/23 1350   Temp 36.6 °C (97.8 °F) 01/16/23 1340   Pulse 96 01/16/23 1352   Resp 15 01/16/23 1352   SpO2 87 % 01/16/23 1352   Vitals shown include unvalidated device data.

## 2023-01-16 NOTE — BRIEF OP NOTE
Brief Postoperative Note    Patient: Aristides Metcalf  YOB: 1965  MRN: [id number]    Date of Procedure: 1/16/2023     Pre-Op Diagnosis: Osteoarthritis of left knee, unspecified osteoarthritis type [M17.12]    Post-Op Diagnosis: Same as preoperative diagnosis. Procedure(s):  LEFT TOTAL KNEE ARTHROPLASTY    Surgeon(s):  Ida Coleman MD    Surgical Assistant: Physician Assistant: Kaleb Dickens PA-C  Surg Asst-1: Laureen Cristobal    Anesthesia: General     Estimated Blood Loss (mL): less than 50     Complications: None    Specimens: * No specimens in log *     Implants:   Implant Name Type Inv. Item Serial No.  Lot No. LRB No. Used Action   CEMENT BNE 20ML 41GM FULL DOSE PMMA W/ TOBRA M VISC RADPQ - [id number]  CEMENT BNE 20ML 41GM FULL DOSE PMMA W/ TOBRA M VISC RADPQ  [employer] [facility] [id number] Left 1 Implanted   CEMENT BNE 20ML 41GM FULL DOSE PMMA W/ TOBRA M VISC RADPQ - [id number]  CEMENT BNE 20ML 41GM FULL DOSE PMMA W/ TOBRA M VISC RADPQ  [employer] [facility] [id number] Left 1 Implanted   BASEPLATE TIB SZ 6 [id number] ML77MM THK2. 3MM L KNEE TI ALLY NP - [id number]  BASEPLATE TIB SZ 6 [id number] ML77MM THK2. 3MM L KNEE TI ALLY NP  [employer]_ [id number] Left 1 Implanted   COMPONENT FEM SZ 7 L KNEE OXINIUM POST STBL ERIK LEGION - [id number]  COMPONENT FEM SZ 7 L KNEE OXINIUM POST STBL ERIK LEGION  [employer]_ [id number] Left 1 Implanted   IMPLANT PATELLAR [id number] X3 ASYMMETRIC TRIATHLON - [id number]  IMPLANT PATELLAR [id number] X3 ASYMMETRIC TRIATHLON  [employer] HOW_ [id number] Left 1 Implanted   INSERT TIB SZ 5-6 [id number] KNEE POST STBL HI FLX LEGION - [id number]  INSERT TIB SZ 5-6 [id number] KNEE POST STBL HI FLX LEGION  [employer]_ [id number] Left 1 Implanted       Drains: * No LDAs found *    Findings: same    Electronically Signed by Cynthia Nick MD on 1/16/2023 at 11:40 AM

## 2023-01-16 NOTE — PROGRESS NOTES
Spoke with coordinator about the pt's SATs pt is in the low 80s on 2 Liters. Pt's sats were in the low 80s in PACU as well. Dr. Mio Ramos will be contacted now.

## 2023-01-16 NOTE — PROGRESS NOTES
Problem: Mobility Impaired (Adult and Pediatric)  Goal: *Acute Goals and Plan of Care (Insert Text)  Description: Physical Therapy Goals  Initiated 1/16/2023 and to be accomplished within 7 day(s)  1. Patient will move from supine to sit and sit to supine  in bed with modified independence. 2.  Patient will transfer from bed to chair and chair to bed with modified independence using the least restrictive device. 3.  Patient will perform sit to stand with modified independence. 4.  Patient will ambulate with modified independence for 200 feet with the least restrictive device. 5.  Patient will ascend/descend 14 stairs with handrail(s) with supervision/set-up. PLOF: Patient was independent with mobility. Sometimes using RW for distances. He lives with spouse in 2 story home. Outcome: Progressing Towards Goal      PHYSICAL THERAPY EVALUATION    Patient: Jose L Arriaga (19 y.o. male)  Date: 1/16/2023  Primary Diagnosis: Osteoarthritis of left knee, unspecified osteoarthritis type [M17.12]  Arthritis of knee [M17.10]  Procedure(s) (LRB):  LEFT TOTAL KNEE ARTHROPLASTY (Left) Day of Surgery   Precautions:   Fall, WBAT        ASSESSMENT :  Patient is 61 yo male admitted to hospital for TKA is agreeable to therapy. Patient was educated on weight bearing status, role of therapy, TE (see below), and equipment in room including role of SCDs, towel roll, and ice sleeve Min A supine to sit transfer to manage left LE. Patient is unable to perform LAQ with left LE at this point and therefore deferred OOB mobility for safety. At conclusion of session patient transferred to supine in bed and was left resting with call bell by the side, and SCDs donned . Patient instructed to call for assistance if they needed to get up for any reason and denied need for further assistance. Patient will benefit from skilled intervention to address the above impairments.   Patient's rehabilitation potential is considered to be Good  Factors which may influence rehabilitation potential include:   [x]         None noted  []         Mental ability/status  []         Medical condition  []         Home/family situation and support systems  []         Safety awareness  []         Pain tolerance/management  []         Other:      PLAN :  Recommendations and Planned Interventions:   [x]           Bed Mobility Training             [x]    Neuromuscular Re-Education  [x]           Transfer Training                   []    Orthotic/Prosthetic Training  [x]           Gait Training                          []    Modalities  [x]           Therapeutic Exercises           []    Edema Management/Control  [x]           Therapeutic Activities            [x]    Family Training/Education  [x]           Patient Education  []           Other (comment):    Frequency/Duration: Patient will be followed by physical therapy 1-2 times per day/4-7 days per week to address goals. Further Equipment Recommendations for Discharge: n/a    AMPAC:   Current research shows that an AM-PAC score of 18 (14 without stairs) or greater is associated with a discharge to the patient's home setting. Based on an AM-PAC score of 16/24 (or **/20 if omitting stairs) and their current functional mobility deficits, it is recommended that the patient have 2-3 sessions per week of Physical Therapy at d/c to increase the patient's independence. This AMPAC score should be considered in conjunction with interdisciplinary team recommendations to determine the most appropriate discharge setting. Patient's social support, diagnosis, medical stability, and prior level of function should also be taken into consideration. 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     OBJECTIVE DATA SUMMARY:     Past Medical History:   Diagnosis Date    Chronic back pain     Diabetes ([facility] [address]     Hypertension     Unspecified sleep apnea     Use CPAP     Past Surgical History:   Procedure Laterality Date    [facility] KNEE ARTHROSCOPY/SURGERY Left     [place] UNLISTED NEUROLOGICAL/NEUROMUSCULAR DX PX      spinal fusion X10 Lumbar region     Barriers to Learning/Limitations: None  Compensate with: N/A  Home Situation:  Home Situation  Home Environment: Private residence  # Steps to Enter: 1  One/Two Story Residence: Two story  # of Interior Steps: 14  Living Alone: No  Support Systems: Spouse/Significant Other  Critical Behavior:                       Skin Integrity: Incision (comment)  Skin Integumentary  Skin Integrity: Incision (comment)     Strength:    Strength: Generally decreased, functional (left LE)                    Tone & Sensation:   Tone: Normal              Sensation: Impaired               Range Of Motion:  AROM: Generally decreased, functional (left LE)                      Functional Mobility:  Bed Mobility:     Supine to Sit: Minimum assistance  Sit to Supine: Minimum assistance          Balance:   Sitting: Intact      Pain:  Pain level pre-treatment: 0/10   Pain level post-treatment: 0/10   Pain Intervention(s) : Medication (see MAR); Rest, Ice, Repositioning  Response to intervention: Nurse notified, See doc flow    Activity Tolerance:   Fair  Please refer to the flowsheet for vital signs taken during this treatment. After treatment:   []         Patient left in no apparent distress sitting up in chair  [x]         Patient left in no apparent distress in bed  [x]         Call bell left within reach  [x]         Nursing notified  []         Caregiver present  []         Bed alarm activated  []         SCDs applied    COMMUNICATION/EDUCATION:   [x]         Role of Physical Therapy in the acute care setting. [x]         Fall prevention education was provided and the patient/caregiver indicated understanding. [x]         Patient/family have participated as able in goal setting and plan of care. [x]         Patient/family agree to work toward stated goals and plan of care.   []         Patient understands intent and goals of therapy, but is neutral about his/her participation. []         Patient is unable to participate in goal setting/plan of care: ongoing with therapy staff.  []         Other: Thank you for this referral.  Guero Benton, PT   Time Calculation: 20 mins      Eval Complexity: History: LOW Complexity : Zero comorbidities / personal factors that will impact the outcome / POCExam:LOW Complexity : 1-2 Standardized tests and measures addressing body structure, function, activity limitation and / or participation in recreation  Presentation: LOW Complexity : Stable, uncomplicated  Clinical Decision Making:Low Complexity    Overall Complexity:LOW     [facility] AM-PAC® Basic Mobility Inpatient Short Form (6-Clicks) Version 2    How much HELP from another person does the patient currently need    (If the patient hasn't done an activity recently, how much help from another person do you think he/she would need if he/she tried?)   Total (Total A or Dep)   A Lot  (Mod to Max A)   A Little (Sup or Min A)   None (Mod I to I)   Turning from your back to your side while in a flat bed without using bedrails? [] 1 [] 2 [x] 3 [] 4   2. Moving from lying on your back to sitting on the side of a flat bed without using bedrails? [] 1 [] 2 [x] 3 [] 4   3. Moving to and from a bed to a chair (including a wheelchair)? [] 1 [] 2 [x] 3 [] 4   4. Standing up from a chair using your arms (e.g., wheelchair, or bedside chair)? [] 1 [] 2 [x] 3 [] 4   5. Walking in hospital room? [] 1 [x] 2 [] 3 [] 4   6. Climbing 3-5 steps with a railing?+   [] 1 [x] 2 [] 3 [] 4   +If stair climbing cannot be assessed, skip item #6. Sum responses from items 1-5.

## 2023-01-16 NOTE — CONSULTS
History and Physical    Patient: Tish Saunders MRN: [id number]  SSN: [id number]    YOB: 1965    Age: 62 y.o. Sex: male    Lela Landin MD    C/C : Hypoxia - post op     Subjective:      Tish Saunders is a 62 y.o. male with PMH of multiple back surgeries , HTN , Recent pre op NST apparently normal , now s/p Left total knee replacement. Patient is alert awake and oriented     Patient is good historian   Patient is post op and no leg pains   Patient denies any CP / No SOB / No Distress , No Cough , No Calf  tenderness , COMPLETELY asymptomatic . Initial pulse ox was less than 80% and with 2 L O2 sats above 96 % - ? Error     Dose of lasix given ,     Rpt room air o2 sats before lasix - 91 % with 4 L    Past Medical History:   Diagnosis Date    Chronic back pain     Diabetes ([address]     Hypertension     Unspecified sleep apnea     Use CPAP     Past Surgical History:   Procedure Laterality Date    HC KNEE ARTHROSCOPY/SURGERY Left     IN UNLISTED NEUROLOGICAL/NEUROMUSCULAR DX PX      spinal fusion X10 Lumbar region      Family History   Problem Relation Age of Onset    No Known Problems Mother     Diabetes Father     Hypertension Father      Social History     Tobacco Use    Smoking status: Never    Smokeless tobacco: Never   Substance Use Topics    Alcohol use: Yes      Prior to Admission medications    Medication Sig Start Date End Date Taking? Authorizing Provider   oxyCODONE-acetaminophen (Percocet) 7.5-325 mg per tablet Take 1-2 Tablets by mouth every six (6) hours as needed for Pain for up to 7 days. Max Daily Amount: 8 Tablets. 1/16/23 1/23/23 Yes Erin LUNA PA-C   docusate sodium (Colace) 100 mg capsule Take 1 Capsule by mouth two (2) times a day for 90 days. 1/16/23 4/16/23 Yes Karie May PA-C   aspirin delayed-release 81 mg tablet Take 1 Tablet by mouth two (2) times a day.  1/16/23  Yes Erin LUNA PA-C   cefadroxil (DURICEF) 500 mg capsule Take 1 Capsule by mouth two (2) times a day. 1/16/23  Yes Justin Mendes PA-C   celecoxib (CeleBREX) 200 mg capsule Take 1 Capsule by mouth two (2) times a day for 90 days. 1/16/23 4/16/23 Yes Anahy LUNA PA-C   ondansetron hcl (Zofran) 4 mg tablet Take 1 Tablet by mouth every eight (8) hours as needed for Nausea or Vomiting. 1/16/23  Yes Justin Mendes PA-C   levothyroxine (SYNTHROID) 175 mcg tablet Take 175 mcg by mouth Daily (before breakfast). Yes Provider, Historical   amLODIPine (NORVASC) 10 mg tablet Take  by mouth daily. Yes Provider, Historical   atorvastatin (LIPITOR) 10 mg tablet Take  by mouth daily. Yes Provider, Historical   hydroCHLOROthiazide (HYDRODIURIL) 25 mg tablet Take 25 mg by mouth daily. Yes Provider, Historical   insulin glargine (LANTUS,BASAGLAR) 100 unit/mL (3 mL) inpn 50 Units by SubCUTAneous route daily. Yes Provider, Historical   losartan (COZAAR) 100 mg tablet Take 100 mg by mouth daily. Yes Provider, Historical   traMADol (ULTRAM) 50 mg tablet Take 1 Tab by mouth every six (6) hours as needed for Pain. Max Daily Amount: 200 mg. 4/2/18  Yes Laureen Borges MD   cholecalciferol (VITAMIN D3) 25 mcg (1,000 unit) cap Take  by mouth daily. Yes Provider, Historical   metFORMIN (GLUCOPHAGE) 1,000 mg tablet Take 1,000 mg by mouth two (2) times daily (with meals). Yes Provider, Historical   potassium chloride SA (MICRO-K) 10 mEq capsule Take 10 mEq by mouth daily. Provider, Historical        No Known Allergies    Review of Systems:  positive responses in bold type   Constitutional: Negative for fever, chills, diaphoresis and unexpected weight change. HENT: Negative for ear pain, congestion, sore throat, rhinorrhea, drooling, trouble swallowing, neck pain and tinnitus. Eyes: Negative for photophobia, pain, redness and visual disturbance. Respiratory: negative for shortness of breath, cough, choking, chest tightness, wheezing or stridor.    Cardiovascular: Negative for chest pain, palpitations and leg swelling. Gastrointestinal: Negative for nausea, vomiting, abdominal pain, diarrhea, constipation, blood in stool, abdominal distention and anal bleeding. Genitourinary: Negative for dysuria, urgency, frequency, hematuria, flank pain and difficulty urinating. Musculoskeletal: Negative for back pain and arthralgias. Skin: Negative for color change, rash and wound. Neurological: Negative for dizziness, seizures, syncope, speech difficulty, light-headedness or headaches. Hematological: Does not bruise/bleed easily. Psychiatric/Behavioral: Negative for suicidal ideas, hallucinations, behavioral problems, self-injury or agitation          Objective: Body mass index is 35.26 kg/m².   Vitals:    01/16/23 1320 01/16/23 1330 01/16/23 1340 01/16/23 1553   BP: 128/70 132/79 123/80 128/78   Pulse: 60 88 88 (!) 102   Resp: 11 14 14 16   Temp:   97.8 °F (36.6 °C) 97 °F (36.1 °C)   SpO2: 96% 95% 94% (!) 87%   Weight:       Height:            Physical Exam:  General appearance - alert, well appearing, and in no distress and oriented to person, place, and time  Mental status - alert, oriented to person, place, and time, normal mood, behavior, speech, dress, motor activity, and thought processes  Eyes - pupils equal and reactive, extraocular eye movements intact  Neck - supple, no significant adenopathy  Chest - Bilateral air entry present - Fine basal rales present   Heart - normal rate, regular rhythm, normal S1, S2, no murmurs, rubs, clicks or gallops  Abdomen - soft, nontender, nondistended, no masses or organomegaly  Extremities - peripheral pulses normal, no pedal edema, no clubbing or cyanosis          Hospital Problems  Date Reviewed: 12/28/2022            Codes Class Noted POA    Arthritis of knee ICD-10-CM: M17.10  ICD-9-CM: 716.96  1/16/2023 Unknown           CBC:  Lab Results   Component Value Date/Time    WBC 7.4 12/12/2022 07:55 AM    HGB 14.1 12/12/2022 07:55 AM    HCT 42.0 12/12/2022 07:55 AM    PLATELET 127 25/20/0804 07:55 AM    MCV 90.3 12/12/2022 07:55 AM        CMP:  Lab Results   Component Value Date/Time    Sodium 141 12/12/2022 07:55 AM    Potassium 4.1 12/12/2022 07:55 AM    Chloride 105 12/12/2022 07:55 AM    CO2 29 12/12/2022 07:55 AM    Anion gap 7 12/12/2022 07:55 AM    Glucose 116 (H) 12/12/2022 07:55 AM    BUN 22 (H) 12/12/2022 07:55 AM    Creatinine 1.59 (H) 12/12/2022 07:55 AM    BUN/Creatinine ratio 14 12/12/2022 07:55 AM    GFR est AA >60 04/20/2018 09:41 AM    GFR est non-AA 59 (L) 04/20/2018 09:41 AM    Calcium 9.7 12/12/2022 07:55 AM    Albumin 4.0 12/12/2022 07:55 AM        PT/INR  Lab Results   Component Value Date/Time    INR 1.0 12/12/2022 07:55 AM    Prothrombin time 13.1 12/12/2022 07:55 AM            EKG: No results found for this or any previous visit.        Assessment and  Plan:     1 Hypoxia   - Post op   Possibilities - Fluid overload - Lasix one dose   - Supplemental o2 - Current requirement 4 L   - Restrict po liquids   - Chest x ray   - EKG  - Labs - BMP / CBC / D dimer ( If high further work up for Thrombotic Episoded like PE )     2 H/o HTN     3 CKD 3 - last GFR 50 - hold losartan , restart Hctz on discharge , defer Losartan to PCP     4 Hypothyroid on replacement         Signed By: Rhonda Escamilla MD     January 16, 2023

## 2023-01-16 NOTE — NURSE NAVIGATOR
Rounded on patient s/p left total knee replacement with Dr. Amanda Weiss 01/16/2023. Patient observed to be alert and oriented x 3, sitting up in bed. He denies chest pain, shortness of breath, nausea or vomiting. He states that he has numbness to his left foot and is unable to do a straight raises. Ace wrap noted to left knee, dressing underneath noted as clean, dry and intact. Total knee replacement education book provided to patient along with education regarding the importance of using incentive spirometry. Ice pack provided for comfort. Patient has had low 02 sats the entire time he was in PACU require 4- 6 liters of 02 to keep 02 levels at 95 %. O2 sats on floor 87%. Patient placed on 4 liters of 02 now at 95 %. Call placed to [facility] per HAO barron keep patient on 4 liters of 02 and place consult for hospitalist. Call placed to Dr. Verna Miller. He will see patient and enter appropriate orders. Will continue to monitor.

## 2023-01-16 NOTE — ANESTHESIA PROCEDURE NOTES
Peripheral Block    Start time: 1/16/2023 9:55 AM  End time: 1/16/2023 9:59 AM  Performed by: Rush Moser MD  Authorized by: Rush Moser MD       Pre-procedure: Indications: at surgeon's request, post-op pain management and procedure for pain    Preanesthetic Checklist: patient identified, risks and benefits discussed, site marked, timeout performed, anesthesia consent given, patient being monitored and fire risk safety assessment completed and verbalized      Block Type:   Block Type: Adductor canal block  Laterality:  Left  Monitoring:  Standard ASA monitoring, continuous pulse ox, frequent vital sign checks, oxygen, heart rate and responsive to questions  Injection Technique:  Single shot  Procedures: ultrasound guided    Patient Position: supine  Prep: chlorhexidine    Location:  Upper thigh  Needle Type:  Stimuplex  Needle Gauge:  21 G  Needle Localization:  Ultrasound guidance  Medication Injected:  Dexamethasone (DECADRON) 4 mg/mL injection - Peripheral Nerve Block   8 mg - 1/16/2023 10:00:00 AM  ropivacaine (PF) (NAROPIN)(0.5%) 5 mg/mL injection - Peripheral Nerve Block   20 mL - 1/16/2023 10:06:00 AM  Med Admin Time: 1/16/2023 10:00 AM    Assessment:  Number of attempts:  1  Injection Assessment:  No paresthesia, incremental injection every 5 mL, ultrasound image on chart, no intravascular symptoms, negative aspiration for blood and local visualized surrounding nerve on ultrasound  Patient tolerance:  Patient tolerated the procedure well with no immediate complications  Location:  PREOP HOLDING    Patient given 1 mg IV Versed and 50 mcg IV Fentanyl for sedation.     1/16/2023     9:59 AM     Avis Campo MD

## 2023-01-16 NOTE — OP NOTES
[address] [place] [address]   OPERATIVE REPORT    Name:  Regi Zuniga  MR#:   [id number]  :  1965  ACCOUNT #:  [de-identified]  DATE OF SERVICE:  2023    PREOPERATIVE DIAGNOSES:  End-stage arthritis of the left knee with varus malalignment and obesity. POSTOPERATIVE DIAGNOSES:  End-stage arthritis of the left knee with varus malalignment and obesity. PROCEDURE PERFORMED:  Left total knee replacement using the [employer] system with a size 7 posterior-stabilized Legion Oxinium femoral component, size 6 left tibial baseplate, a 14-BV size 5-6 Legion posterior-stabilized high-flexion articular insert and a 38 asymmetric patella. SURGEON:  Tawana Natarajan MD    FIRST ASSISTANT:  Nas Carlson. SECOND ASSISTANT:  Tatiana Pineda. ANESTHETIST:  Dr. Josefa Grande. ANESTHESIA:  Preoperative nerve block with light general.    COMPLICATIONS:  No complications. SPECIMENS REMOVED:  No specimen. IMPLANTS:  As above mentioned. ESTIMATED BLOOD LOSS:  Less than 50 mL. Nas Carlson was the first assistant who assisted with all phases of the surgery commencing with patient positioning, patient prep, patient drape, leg positioning during surgery, retracting, assisting with the surgery itself, closure, dressing placement and transfer. PROCEDURE:  After the anesthetic was successfully induced, it was confirmed the patient received his antibiotics and a time-out was performed. Midline incision. Knee debrided in the usual fashion. Femoral canal aspirated, lavaged, and re-aspirated prior to instrumentation. Crab claw was utilized to prevent undercutting. All cuts checked for trueness and squareness, and all soft tissue structures protected during the sawing process. Modified gap balancing technique would be performed and we would take an extra 2 off to help correct the fixed flexion deformity.   After preliminary femoral cuts, we switched our attention to the tibia, used the external alignment guide and resected enough to get a good clean-up cut while protecting the neurovascular bundle and used the Aquamantys and Exparel cocktail. We then did a modified gap balancing technique which confirmed correct femoral rotation. We then did our femoral finishing followed by placement of the trial components to set our tibial rotation which was marked and later re-punched. We then resurfaced the patella restoring patellar fitness and did a very mild lateral release as he had fairly significant preoperative patellar tilt. With all the trial components in place, we checked the overall alignment, range of motion, soft tissue balance, patellar tracking, stability, and alignment, all of which we were delighted with. Fashioned a bone plug for the femoral canal.  Further control of hemostasis. Cemented in the knee and holding the knee in full extension, the cement was fully cured. Further cement removal, pulse lavage and trialing and I was happiest with the 10, locked it in place. Let the tourniquet down. Routine closure. Fully flexed the knee prior to closure of the skin. At the end of the case, instrument, sponge, and needle count was correct. No complications. The patient tolerated the procedure well, and blood loss less than 50. Excellent outcome of the case.       Sundar Burns MD [id number]  D:  01/16/2023 12:08  T:  01/16/2023 14:01  JOB #:  [id number]

## 2023-01-16 NOTE — INTERVAL H&P NOTE
Update History & Physical    The Patient's History and Physical of January 16, 2022 was reviewed with the patient and I examined the patient. There was no change. The surgical site was confirmed by the patient and me. Plan:  The risk, benefits, expected outcome, and alternative to the recommended procedure have been discussed with the patient. Patient understands and wants to proceed with the procedure.     Electronically signed by Jeanine Ross MD on 1/16/2023 at 9:20 AM

## 2023-01-16 NOTE — ANESTHESIA PREPROCEDURE EVALUATION
Relevant Problems   No relevant active problems       Anesthetic History   No history of anesthetic complications            Review of Systems / Medical History  Patient summary reviewed, nursing notes reviewed and pertinent labs reviewed    Pulmonary        Sleep apnea: CPAP           Neuro/Psych   Within defined limits           Cardiovascular    Hypertension              Exercise tolerance: >4 METS     GI/Hepatic/Renal  Within defined limits              Endo/Other    Diabetes    Morbid obesity     Other Findings              Physical Exam    Airway  Mallampati: III  TM Distance: 4 - 6 cm  Neck ROM: normal range of motion   Mouth opening: Normal     Cardiovascular  Regular rate and rhythm,  S1 and S2 normal,  no murmur, click, rub, or gallop  Rhythm: regular  Rate: normal         Dental  No notable dental hx       Pulmonary  Breath sounds clear to auscultation               Abdominal  GI exam deferred       Other Findings            Anesthetic Plan    ASA: 3  Anesthesia type: general          Induction: Intravenous  Anesthetic plan and risks discussed with: Patient

## 2023-01-17 VITALS
DIASTOLIC BLOOD PRESSURE: 76 MMHG | BODY MASS INDEX: 35.39 KG/M2 | RESPIRATION RATE: 16 BRPM | HEIGHT: 71 IN | WEIGHT: 252.8 LBS | SYSTOLIC BLOOD PRESSURE: 141 MMHG | OXYGEN SATURATION: 97 % | TEMPERATURE: 98 F | HEART RATE: 88 BPM

## 2023-01-17 LAB — GLUCOSE BLD STRIP.AUTO-MCNC: 249 MG/DL (ref 70–110)

## 2023-01-17 PROCEDURE — 74011250637 HC RX REV CODE- 250/637: Performed by: ORTHOPAEDIC SURGERY

## 2023-01-17 PROCEDURE — 74011250636 HC RX REV CODE- 250/636: Performed by: ORTHOPAEDIC SURGERY

## 2023-01-17 PROCEDURE — 74011000250 HC RX REV CODE- 250: Performed by: ORTHOPAEDIC SURGERY

## 2023-01-17 PROCEDURE — 74011250636 HC RX REV CODE- 250/636: Performed by: INTERNAL MEDICINE

## 2023-01-17 PROCEDURE — 77010033678 HC OXYGEN DAILY

## 2023-01-17 PROCEDURE — G0378 HOSPITAL OBSERVATION PER HR: HCPCS

## 2023-01-17 PROCEDURE — 82962 GLUCOSE BLOOD TEST: CPT

## 2023-01-17 PROCEDURE — 97535 SELF CARE MNGMENT TRAINING: CPT

## 2023-01-17 PROCEDURE — 97165 OT EVAL LOW COMPLEX 30 MIN: CPT

## 2023-01-17 PROCEDURE — 74011636637 HC RX REV CODE- 636/637: Performed by: ORTHOPAEDIC SURGERY

## 2023-01-17 PROCEDURE — 97116 GAIT TRAINING THERAPY: CPT

## 2023-01-17 RX ADMIN — FERROUS SULFATE TAB 325 MG (65 MG ELEMENTAL FE) 325 MG: 325 (65 FE) TAB at 09:51

## 2023-01-17 RX ADMIN — PREGABALIN 25 MG: 25 CAPSULE ORAL at 09:51

## 2023-01-17 RX ADMIN — OXYCODONE HYDROCHLORIDE 15 MG: 5 TABLET ORAL at 09:51

## 2023-01-17 RX ADMIN — ACETAMINOPHEN 1000 MG: 500 TABLET ORAL at 05:15

## 2023-01-17 RX ADMIN — ASPIRIN 81 MG: 81 TABLET, COATED ORAL at 09:51

## 2023-01-17 RX ADMIN — AMLODIPINE BESYLATE 10 MG: 10 TABLET ORAL at 09:51

## 2023-01-17 RX ADMIN — Medication 4 UNITS: at 07:01

## 2023-01-17 RX ADMIN — LEVOTHYROXINE SODIUM 175 MCG: 150 TABLET ORAL at 05:15

## 2023-01-17 RX ADMIN — SENNOSIDES AND DOCUSATE SODIUM 1 TABLET: 50; 8.6 TABLET ORAL at 09:51

## 2023-01-17 RX ADMIN — FUROSEMIDE 40 MG: 10 INJECTION, SOLUTION INTRAMUSCULAR; INTRAVENOUS at 09:51

## 2023-01-17 RX ADMIN — CEFAZOLIN 3 G: 1 INJECTION, POWDER, FOR SOLUTION INTRAMUSCULAR; INTRAVENOUS at 09:51

## 2023-01-17 RX ADMIN — CELECOXIB 200 MG: 100 CAPSULE ORAL at 09:51

## 2023-01-17 RX ADMIN — SODIUM CHLORIDE, PRESERVATIVE FREE 10 ML: 5 INJECTION INTRAVENOUS at 05:17

## 2023-01-17 RX ADMIN — KETOROLAC TROMETHAMINE 15 MG: 15 INJECTION, SOLUTION INTRAMUSCULAR; INTRAVENOUS at 05:15

## 2023-01-17 RX ADMIN — CEFAZOLIN 3 G: 1 INJECTION, POWDER, FOR SOLUTION INTRAMUSCULAR; INTRAVENOUS at 02:09

## 2023-01-17 NOTE — PROGRESS NOTES
Discharge order noted for today. Pt has been accepted to [facility] agency. Met with patient and his wife and are agreeable to the transition plan today. Transport has been arranged through pt's wife. Patient's discharge summary and home health  orders have been forwarded to [facility] health  agency via [facility]. Updated bedside,  to the transition plan.   Discharge information has been documented on the AVS.       Frankey Coop, BSN RN  Care Management  Pager: [phone]

## 2023-01-17 NOTE — NURSE NAVIGATOR
Rounded on patient s/p left total knee replacement with Dr. Ramin Wilson 01/16/2023. Patient observed to be alert and oriented x 3, sitting up in bed. He denies chest pain, shortness of breath, nausea or vomiting. Patient states that numbness to left leg has since resolved and he is able to complete a straight leg raise and move and feel his toes on his left side. Per patient he has not been out of bed since last night. He continues to need 02, medicine is following. Patient assisted with steady gait, standby assist and use of rolling walker to bedside chair. Call bell placed within reach. Ace wrap remains in place, dressing to left knee remains dry and intact with just one very tiny spot of strike through. Patient states that he is voiding without difficulty. Patient does not use oxygen at home. Anticipate discharge home today with home health and home physical therapy if cleared safe for discharge by PT/ OT. Patient denies any chest pain, shortness of breath  and was able to work with occupational therapy with oxygen off and o2 sats remained at 95 %. Will monitor after PT evaluation.

## 2023-01-17 NOTE — PROGRESS NOTES
Blood pressure (!) 141/76, pulse 88, temperature 98 °F (36.7 °C), resp. rate 16, height 5' 11\" (1.803 m), weight 114.7 kg (252 lb 12.8 oz), SpO2 97 %.     No sob or cp    Ambulating well independently    Calf non tender    Moving toes well    02 sat on r/a >95-96    P- home with dvt prophylaxis

## 2023-01-17 NOTE — NURSE NAVIGATOR
Patient has been seen by Case management. Home health has been arranged through the [facility]. Per Dr. Mike Reyes, patient may be discharged home. He was cautioned to return to ER immediately if he experiences any chest pain or shortness of breath. Nurse notified. Will follow patient post operatively.

## 2023-01-17 NOTE — PROGRESS NOTES
Pt is alert and oriented and able to make needs known. No s/s of any pain or discomfort. Discharge instructions reviewed in full detail with the patient. Opportunity given for questions and answers provided. Pt has 4 icepacks to go home with. All belongings are with the patient. IV was removed an intact. Pt was wheeled down in  the wheelchair. Pt is discharged in stable condition.

## 2023-01-17 NOTE — PROGRESS NOTES
Problem: Mobility Impaired (Adult and Pediatric)  Goal: *Acute Goals and Plan of Care (Insert Text)  Description: Physical Therapy Goals  Initiated 1/16/2023 and to be accomplished within 7 day(s)  1. Patient will move from supine to sit and sit to supine  in bed with modified independence. 2.  Patient will transfer from bed to chair and chair to bed with modified independence using the least restrictive device. 3.  Patient will perform sit to stand with modified independence. 4.  Patient will ambulate with modified independence for 200 feet with the least restrictive device. 5.  Patient will ascend/descend 14 stairs with handrail(s) with supervision/set-up. PLOF: Patient was independent with mobility. Sometimes using RW for distances. He lives with spouse in 2 story home. Outcome: Resolved/Met     PHYSICAL THERAPY TREATMENT AND DISCHARGE    Patient: Brooklynn Dunn (61 y.o. male)  Date: 1/17/2023  Diagnosis: Osteoarthritis of left knee, unspecified osteoarthritis type [M17.12]  Arthritis of knee [M17.10] <principal problem not specified>  Procedure(s) (LRB):  LEFT TOTAL KNEE ARTHROPLASTY (Left) 1 Day Post-Op  Precautions: Fall, WBAT    ASSESSMENT:  Based on the objective data described below, the patient presents with pain and decreased PROM. Pt sitting up in recliner in NAD, willing to work with PT. WFL L quad strength today. Pt stood with RW and amb 300 ft with sup and steady gait. Pt also ascended/descended 4 steps steps with sup. Pt returned sitting up in recliner for exercises per flow sheet and was left with B LE elevated, L heel propped on towel, all needs met. Pt is cleared for d/c. PLAN:  Maximum therapeutic gains met at current level of care and patient will be discharged from physical therapy at this time.   Rationale for discharge:  [x]     Goals Achieved  []     [address]  []     Patient not participating in therapy  []     Other:    Further Equipment Recommendations for Discharge:  N/A    AMPAC: At this time and based on an AM-PAC score of 21/24 (or **/20 if omitting stairs), no further PT is recommended upon discharge due to (i.e. patient at baseline functional statusetc). Recommend patient returns to prior setting with prior services. This AMPA score should be considered in conjunction with interdisciplinary team recommendations to determine the most appropriate discharge setting. Patient's social support, diagnosis, medical stability, and prior level of function should also be taken into consideration. SUBJECTIVE:   Patient stated I feel better.     OBJECTIVE DATA SUMMARY:   Critical Behavior:  Neurologic State: Alert  Orientation Level: Oriented X4  Cognition: Follows commands  Safety/Judgement: Fall prevention  Functional Mobility Training:  Bed Mobility:     Supine to Sit:  (Pt up in chair upon arrival.)               Transfers:  Sit to Stand: Supervision  Stand to Sit: Supervision             Balance:  Sitting: Intact  Standing: Intact   Range Of Motion:   AROM: Within functional limits             Ambulation/Gait Training:  Distance (ft): 300 Feet (ft)  Assistive Device: Walker, rolling  Ambulation - Level of Assistance: Supervision        Gait Abnormalities: Decreased step clearance   Stairs:  Number of Stairs Trained: 4  Stairs - Level of Assistance: Stand-by assistance  Rail Use: Both    Therapeutic Exercises:   Pt performed x10 LAQ's marches ankle pumps      Pain:  Pain level pre-treatment: 4/10   Pain level post-treatment: 4/10   Pain Intervention(s): Medication (see MAR); Rest, Ice, Repositioning   Response to intervention: Nurse notified, See doc flow    Activity Tolerance:   Pt tolerated mobility well  Please refer to the flowsheet for vital signs taken during this treatment.   After treatment:   [x] Patient left in no apparent distress sitting up in chair  [] Patient left in no apparent distress in bed  [x] Call bell left within reach  [x] Nursing notified  [] Caregiver present  [] Bed alarm activated  [] SCDs applied      COMMUNICATION/EDUCATION:   [x]         Role of Physical Therapy in the acute care setting. [x]         Fall prevention education was provided and the patient/caregiver indicated understanding. [x]         Patient/family have participated as able and agree with findings and recommendations. []         Patient is unable to participate in plan of care at this time. []         Other:        Srinivasan Palacioszach   Time Calculation: 8 mins    [facility]PAC® Basic Mobility Inpatient Short Form (6-Clicks) Version 2    How much HELP from another person does the patient currently need    (If the patient hasn't done an activity recently, how much help from another person do you think he/she would need if he/she tried?)   Total (Total A or Dep)   A Lot  (Mod to Max A)   A Little (Sup or Min A)   None (Mod I to I)   Turning from your back to your side while in a flat bed without using bedrails? [] 1 [] 2 [] 3 [x] 4   2. Moving from lying on your back to sitting on the side of a flat bed without using bedrails? [] 1 [] 2 [] 3 [x] 4   3. Moving to and from a bed to a chair (including a wheelchair)? [] 1 [] 2 [] 3 [x] 4   4. Standing up from a chair using your arms (e.g., wheelchair, or bedside chair)? [] 1 [] 2 [x] 3 [] 4   5. Walking in hospital room? [] 1 [] 2 [x] 3 [] 4   6. Climbing 3-5 steps with a railing?+   [] 1 [] 2 [x] 3 [] 4   +If stair climbing cannot be assessed, skip item #6. Sum responses from items 1-5. At this time and based on an AM-PAC score of 21/24 (or **/20 if omitting stairs), no further PT is recommended upon discharge due to (i.e. patient at baseline functional statusetc). Recommend patient returns to prior setting with prior services.

## 2023-01-17 NOTE — PROGRESS NOTES
Care Management Interventions  PCP Verified by CM: Yes  Palliative Care Criteria Met (RRAT>21 & CHF Dx)?: No  Mode of Transport at Discharge: Other (see comment) (family)  Transition of Care Consult (CM Consult): [address]: No  Reason Outside [place]: Managed care specific requirement  Physical Therapy Consult: Yes  Occupational Therapy Consult: Yes  Support Systems: Spouse/Significant Other  Confirm Follow Up Transport: Family  The Patient and/or Patient Representative was Provided with a Choice of Provider and Agrees with the Discharge Plan?: Yes  Freedom of Choice List was Provided with Basic Dialogue that Supports the Patient's Individualized Plan of Care/Goals, Treatment Preferences and Shares the Quality Data Associated with the Providers?: Yes  Discharge Location  Patient Expects to be Discharged to[de-identified] Home with home health      Per pt he lives with his wife and independent. He has rolling walker, stair lift, cane, BSC and shower chair. He stated his home health has been approved through the [place] with [facility] P[facility] and [facility] called him yesterday. CM called Mary Lou Ross and she confirmed they have auth from the [place]. She stated to send pt's home health orders to [facility] to [facility]RE[facility] [place] and orders sent.         Frankey Coop, BSN RN  Care Management  Pager: [phone]

## 2023-01-17 NOTE — PROGRESS NOTES
VS taken ,offered water , educated on blood sugar issues eg taking soda before Bs check      Bedside and Verbal shift change report given to [address] [place] [address],[address] (oncoming nurse) by Filipe Winchester RN (offgoing nurse). Report included the following information SBAR, Kardex, Intake/Output, and MAR.

## 2023-01-17 NOTE — DISCHARGE INSTRUCTIONS
DISCHARGE SUMMARY from Nurse    PATIENT INSTRUCTIONS:    After general anesthesia or intravenous sedation, for 24 hours or while taking prescription Narcotics:  Limit your activities  Do not drive and operate hazardous machinery  Do not make important personal or business decisions  Do  not drink alcoholic beverages  If you have not urinated within 8 hours after discharge, please contact your surgeon on call. Report the following to your surgeon:  Excessive pain, swelling, redness or odor of or around the surgical area  Temperature over 100.5  Nausea and vomiting lasting longer than 4 hours or if unable to take medications  Any signs of decreased circulation or nerve impairment to extremity: change in color, persistent  numbness, tingling, coldness or increase pain  Any questions    What to do at Home:  Recommended activity: Activity as tolerated,     If you experience any of the following symptoms chest pain, fever greater than 100.5, pain unrelieved by medication, please follow up with Sonia Pratt. *  Please give a list of your current medications to your Primary Care Provider. *  Please update this list whenever your medications are discontinued, doses are      changed, or new medications (including over-the-counter products) are added. *  Please carry medication information at all times in case of emergency situations. These are general instructions for a healthy lifestyle:    No smoking/ No tobacco products/ Avoid exposure to second hand smoke  Surgeon General's Warning:  Quitting smoking now greatly reduces serious risk to your health.     Obesity, smoking, and sedentary lifestyle greatly increases your risk for illness    A healthy diet, regular physical exercise & weight monitoring are important for maintaining a healthy lifestyle    You may be retaining fluid if you have a history of heart failure or if you experience any of the following symptoms:  Weight gain of 3 pounds or more overnight or 5 pounds in a week, increased swelling in our hands or feet or shortness of breath while lying flat in bed. Please call your doctor as soon as you notice any of these symptoms; do not wait until your next office visit. Patient armband removed and shredded   MyChart Activation    Thank you for requesting access to [employer]. Please follow the instructions below to securely access and download your online medical record. [employer] allows you to send messages to your doctor, view your test results, renew your prescriptions, schedule appointments, and more. How Do I Sign Up? In your internet browser, go to [email or web address]  Click on the First Time User? Click Here link in the Sign In box. You will be redirect to the New Member Sign Up page. Enter your [employer] Access Code exactly as it appears below. You will not need to use this code after youve completed the sign-up process. If you do not sign up before the expiration date, you must request a new code. [employer] Access Code: Activation code not generated  Current [employer] Status: Active (This is the date your [employer] access code will )    Enter the last four digits of your Social Security Number (xxxx) and Date of Birth (mm/dd/yyyy) as indicated and click Submit. You will be taken to the next sign-up page. Create a [employer] ID. This will be your [employer] login ID and cannot be changed, so think of one that is secure and easy to remember. Create a [employer] password. You can change your password at any time. Enter your Password Reset Question and Answer. This can be used at a later time if you forget your password. Enter your e-mail address. You will receive e-mail notification when new information is available in [address] Click Sign Up. You can now view and download portions of your medical record. Click the [employer] link to download a portable copy of your medical information.     Additional Information    If you have questions, please visit the Frequently Asked Questions section of the [employer] website at [email or web address] Remember, [employer] is NOT to be used for urgent needs. For medical emergencies, dial 911. The discharge information has been reviewed with the {PATIENT PARENT GUARDIAN:71507}. The {PATIENT PARENT GUARDIAN:96719} verbalized understanding. Discharge medications reviewed with the {Dishcarge meds reviewed GZBB:92797} and appropriate educational materials and side effects teaching were provided.   ___________________________________________________________________________________________________________________________________

## 2023-01-17 NOTE — PROGRESS NOTES
OCCUPATIONAL THERAPY EVALUATION/DISCHARGE    Patient: Tish Saunders (89 y.o. male)  Date: 1/17/2023  Primary Diagnosis: Osteoarthritis of left knee, unspecified osteoarthritis type [M17.12]  Arthritis of knee [M17.10]  Procedure(s) (LRB):  LEFT TOTAL KNEE ARTHROPLASTY (Left) 1 Day Post-Op   Precautions:   Fall, WBAT  PLOF: Pt was independent with basic self care tasks and used no AD for functional mobility PTA. ASSESSMENT AND RECOMMENDATIONS:  Based on the objective data described below, the patient is able to perform basic self care tasks and functional standing/transfers without assistance using a RW. Upon entry into room, patient was sitting in the recliner with 2L O2 NC. Pulse ox taken at 99%, then, oxygen removed to ambulate into the bathroom. Pulse ox taken again after he returned to the recliner at 96%. Nursing notified and coming in room for full vitals. Patient has a supportive wife at home to assist him prn and all needed DME for home safety. Skilled occupational therapy is not indicated at this time. Further Equipment Recommendations for Discharge: N/A    AMPAC: At this time and based on an AM-PAC score of 24/24, no further OT is recommended upon discharge. Recommend patient returns to prior setting with prior services. This AMPAC score should be considered in conjunction with interdisciplinary team recommendations to determine the most appropriate discharge setting. Patient's social support, diagnosis, medical stability, and prior level of function should also be taken into consideration. SUBJECTIVE:   Patient stated I feel pretty good.     OBJECTIVE DATA SUMMARY:     Past Medical History:   Diagnosis Date    Chronic back pain     Diabetes ([facility] [address]     Hypertension     Unspecified sleep apnea     Use CPAP     Past Surgical History:   Procedure Laterality Date    HC KNEE ARTHROSCOPY/SURGERY Left     [place] UNLISTED NEUROLOGICAL/NEUROMUSCULAR DX PX      spinal fusion X10 Lumbar region Barriers to Learning/Limitations: None  Compensate with: visual, verbal, tactile, kinesthetic cues/model    Home Situation:   Home Situation  Home Environment: Private residence  # Steps to Enter: 1  One/Two Story Residence: Two story  # of Interior Steps: 14  Living Alone: No  Support Systems: Spouse/Significant Other  Patient Expects to be Discharged to[de-identified] Home with home health  Current DME Used/Available at Home: None  Tub or Shower Type: Tub/Shower combination (with seat)  [x]     Right hand dominant   []     Left hand dominant    Cognitive/Behavioral Status:  Neurologic State: Alert  Orientation Level: Oriented X4  Cognition: Appropriate decision making; Follows commands  Safety/Judgement: Awareness of environment; Fall prevention    Skin: Intact on UEs  Edema: None noted in UEs    Vision/Perceptual:     Acuity: Within Defined Limits      Coordination: BUE  Fine Motor Skills-Upper: Left Intact; Right Intact    Gross Motor Skills-Upper: Left Intact; Right Intact    Balance:  Sitting: Intact  Standing: Intact; With support    Strength: BUE  Strength: Within functional limits    Tone & Sensation: BUE  Tone: Normal  Sensation: Intact    Range of Motion: BUE  AROM: Within functional limits    Functional Mobility and Transfers for ADLs:  Bed Mobility:  Supine to Sit:  (Pt up in chair upon arrival.)    Transfers:  Sit to Stand: Modified independent  Stand to Sit: Modified independent   Toilet Transfer : Modified independent    ADL Assessment:  Feeding: Independent    Oral Facial Hygiene/Grooming: Independent    Bathing: Modified independent    Upper Body Dressing: Independent    Lower Body Dressing: Modified independent    Toileting: Modified independent    ADL Intervention:  Patient practiced UB/LB dressing while seated and in standing. No assist given after initial VCs for safety/ease of performance. He was modified independent with standing balance. Extra time given for donning sneakers.   Patient stood at sink to brush his teeth and also stood for toileting. No LOB noted. Cognitive Retraining  Safety/Judgement: Awareness of environment; Fall prevention    Pain:  Pain level pre-treatment: 3/10, in left knee   Pain level post-treatment: 3/10, in left knee   Pain Intervention(s): Rest, Ice, Repositioning  Response to intervention: Nurse notified    Activity Tolerance:   Good  Please refer to the flowsheet for vital signs taken during this treatment. After treatment:   [x]  Patient left in no apparent distress sitting up in chair  []  Patient left in no apparent distress in bed  [x]  Call bell left within reach  [x]  Nursing notified  [x]  Caregiver (wife) present  []  Bed alarm activated    COMMUNICATION/EDUCATION:   [x]      Role of Occupational Therapy in the acute care setting  [x]      Home safety education was provided and the patient/caregiver indicated understanding. [x]      Patient/family have participated as able and agree with findings and recommendations. []      Patient is unable to participate in plan of care at this time. Thank you for this referral.  Chadd Sahni MS OTR/L   Time Calculation: 24 mins      Eval Complexity: History: LOW Complexity : Brief history review ; Examination: LOW Complexity : 1-3 performance deficits relating to physical, cognitive , or psychosocial skils that result in activity limitations and / or participation restrictions ;    Decision Making:LOW Complexity : No comorbidities that affect functional and no verbal or physical assistance needed to complete eval tasks     Chalino William AM-PAC® Daily Activity Inpatient Short Form (6-Clicks)*    How much HELP from another person does the patient currently need    (If the patient hasn't done an activity recently, how much help from another person do you think he/she would need if he/she tried?)   Total (Total A or Dep)   A Lot  (Mod to Max A)   A Little (Sup or Min A)   None (Mod I to I)   Putting on and taking off regular lower body clothing? [] 1 [] 2 [] 3 [x] 4   2. Bathing (including washing, rinsing,      drying)? [] 1 [] 2 [] 3 [x] 4   3. Toileting, which includes using toilet, bedpan or urinal?   [] 1 [] 2 [] 3 [x] 4   4. Putting on and taking off regular upper body clothing? [] 1 [] 2 [] 3 [x] 4   5. Taking care of personal grooming such as brushing teeth? [] 1 [] 2 [] 3 [x] 4   6. Eating meals? [] 1 [] 2 [] 3 [x] 4     At this time and based on an AM-PAC score of 24/24, no further OT is recommended upon discharge. Recommend patient returns to prior setting with prior services.

## 2023-01-17 NOTE — PROGRESS NOTES
Problem: Pain  Goal: *Control of Pain  Outcome: Progressing Towards Goal  Goal: *PALLIATIVE CARE:  Alleviation of Pain  Outcome: Progressing Towards Goal     Problem: Patient Education: Go to Patient Education Activity  Goal: Patient/Family Education  Outcome: Progressing Towards Goal     Problem: Falls - Risk of  Goal: *Absence of Falls  Description: Document Marciano Mohan Fall Risk and appropriate interventions in the flowsheet.   Outcome: Progressing Towards Goal  Note: Fall Risk Interventions:  Mobility Interventions: Patient to call before getting OOB         Medication Interventions: Patient to call before getting OOB    Elimination Interventions: Patient to call for help with toileting needs

## 2023-01-17 NOTE — PROGRESS NOTES
Bedside and Verbal shift change report given to Haley Ross RN (oncoming nurse) by Donta Weiner RN (offgoing nurse). Report included the following information SBAR, Kardex, Intake/Output and MAR.

## 2023-01-18 ENCOUNTER — TELEPHONE (OUTPATIENT)
Dept: ORTHOPEDIC SURGERY | Age: 58
End: 2023-01-18

## 2023-01-18 ENCOUNTER — TELEPHONE (OUTPATIENT)
Dept: OTHER | Age: 58
End: 2023-01-18

## 2023-01-18 NOTE — TELEPHONE ENCOUNTER
Patient, per nurse Dimitry Vargas, is asymptomatic and doing well. Will continue to monitor BP and advised to call with abnormal result in the meantime.

## 2023-01-18 NOTE — TELEPHONE ENCOUNTER
Call placed to patient, ID verified x 2. Patient is s/p left total knee replacement with Dr. Ezio Mata 01/16/2023. Patient denies chest pain, shortness of breath, nausea, vomiting , fever, chills or calf pain. He states that he does have some chest tightness when he takes a deep breath, but he is not concerned. He has been using his incentive spirometer. He states that his pain is well controlled taking his pain medication only as needed. He has been ambulating hourly and using ice to assist with pain and swelling. He states that his dressing remains dry and intact with some drainage that has increased since working with physical therapy. He states that both physical therapy and home health have been out to see him. Noes from home health indicates that patient has had elevated blood pressure. Reminded patient that if chest tightness continues or worsens of if he starts to have pain he should go to the ER immediately. Patient states that home health checked his O2 saturations today and he was at 97 %. Overall he feels he is doing well. He has no questions or concerns. He verbalized that he will go to the ER if chest tightness worsens, continues or if he has pain in his chest. He will follow up with Dr. Raymundo Hoang in 2 weeks or sooner if needed.

## 2023-01-18 NOTE — TELEPHONE ENCOUNTER
Kenny Byrd , wanted to leave Dr. Stevenson Shetty a message that she saw Sita Arita today and his vital signs are elevated.      Pulse-107    Bpressure-162/102

## 2023-01-19 NOTE — TELEPHONE ENCOUNTER
Jarret Munoz from [facility] called again for Cinthya Peña ,and said that they have no orders for the Patient Left Knee. That the patient has an appt to be seen at Cinthya Peña office on 02/02/23, but she does not know if Cinthya Peña wants her to check the patient Left Knee , or do anything with the patient left knee. Priscilla tel. [phone]. Note : patient has an appt to see HAO Herr on 02/22/23 for the left knee.

## 2023-01-19 NOTE — TELEPHONE ENCOUNTER
Attempted to call Priscilla and left voicemail. If Philipp Quiles returns call please informed Philipp Quiles that the home health order is in and was place on January 16, 2023 for the left knee.

## 2023-01-23 ENCOUNTER — OFFICE VISIT (OUTPATIENT)
Dept: ORTHOPEDIC SURGERY | Age: 58
End: 2023-01-23
Payer: OTHER GOVERNMENT

## 2023-01-23 ENCOUNTER — HOSPITAL ENCOUNTER (OUTPATIENT)
Dept: MRI IMAGING | Age: 58
Discharge: HOME | End: 2023-01-23
Attending: PHYSICIAN ASSISTANT
Payer: OTHER GOVERNMENT

## 2023-01-23 VITALS
WEIGHT: 255 LBS | HEART RATE: 95 BPM | OXYGEN SATURATION: 100 % | TEMPERATURE: 97.8 F | BODY MASS INDEX: 34.54 KG/M2 | HEIGHT: 72 IN

## 2023-01-23 DIAGNOSIS — Z48.89 ENCOUNTER FOR POSTOPERATIVE WOUND CHECK: ICD-10-CM

## 2023-01-23 DIAGNOSIS — M25.469 REDNESS AND SWELLING OF KNEE: ICD-10-CM

## 2023-01-23 DIAGNOSIS — G89.18 POSTOPERATIVE PAIN: Primary | ICD-10-CM

## 2023-01-23 DIAGNOSIS — R23.8 REDNESS AND SWELLING OF KNEE: ICD-10-CM

## 2023-01-23 DIAGNOSIS — S76.112A QUADRICEPS TENDON RUPTURE, LEFT, INITIAL ENCOUNTER: ICD-10-CM

## 2023-01-23 DIAGNOSIS — L03.116 CELLULITIS OF LEFT KNEE: ICD-10-CM

## 2023-01-23 PROCEDURE — 73721 MRI JNT OF LWR EXTRE W/O DYE: CPT

## 2023-01-23 PROCEDURE — 99024 POSTOP FOLLOW-UP VISIT: CPT | Performed by: PHYSICIAN ASSISTANT

## 2023-01-23 NOTE — PROGRESS NOTES
Patient: Angelica Garcia                MRN: [id number]       SSN: [id number]  YOB: 1965        AGE: 62 y.o. SEX: male          PCP: Chavez Wood MD  01/23/23    Chief Complaint   Patient presents with    Knee Pain     Left         HISTORY:  Angelica Garcia is a 62 y.o. male returns to the office 7 days status post his primary left total knee replacement under care of Dr. Hua Neves. He has had some difficulty since he arrived home with extension of his left knee. Patient was concerned over the last weekend about some redness and increased pain associated with the surgical site therefore he was seen through the emergency room at the [place]. There were extensive test done at the [place] to include a CBC which was normal for white count and a CT scan was obtained of the left knee which did not reflect internal derangement. Patient was given IV antibiotics and forwarded to orthopedics this morning. He notes some buckling of his left knee when walking with the 4 post rolling walker.       Pain Assessment  6/23/2022   Location of Pain Knee   Location Modifiers Left   Severity of Pain 7   Quality of Pain Aching;Burning   Quality of Pain Comment -   Duration of Pain Persistent   Frequency of Pain Constant   Aggravating Factors Bending;Walking;Standing;Squatting   Limiting Behavior Yes   Relieving Factors Ice;Heat   Relieving Factors Comment -   Result of Injury No           Lab Results   Component Value Date/Time    Hemoglobin A1c 6.4 (H) 12/12/2022 07:55 AM     Weight Metrics 1/23/2023 1/16/2023 12/28/2022 6/23/2022 7/19/2018 7/3/2018 7/2/2018   Weight 255 lb 252 lb 12.8 oz 250 lb 9.6 oz 245 lb 3.2 oz 253 lb 250 lb 250 lb   BMI 34.58 kg/m2 35.26 kg/m2 34.95 kg/m2 34.2 kg/m2 35.29 kg/m2 34.87 kg/m2 34.87 kg/m2            Problem List Items Addressed This Visit    None  Visit Diagnoses       Postoperative pain    -  Primary    Relevant Orders AMB SUPPLY ORDER    Encounter for postoperative wound check        Relevant Orders    AMB SUPPLY ORDER    Redness and swelling of knee        Relevant Orders    AMB SUPPLY ORDER    Quadriceps tendon rupture, left, initial encounter        Relevant Orders    MRI KNEE LT WO CONT    AMB SUPPLY ORDER    Cellulitis of left knee                PAST MEDICAL HISTORY:   Past Medical History:   Diagnosis Date    Chronic back pain     Diabetes ([address]     Hypertension     Unspecified sleep apnea     Use CPAP       PAST SURGICAL HISTORY:   Past Surgical History:   Procedure Laterality Date    HC KNEE ARTHROSCOPY/SURGERY Left     [place] UNLISTED NEUROLOGICAL/NEUROMUSCULAR DX PX      spinal fusion X10 Lumbar region       ALLERGIES: No Known Allergies     CURRENT MEDICATIONS:  A list of medications prior to the time of admission include:  Prior to Admission medications    Medication Sig Start Date End Date Taking? Authorizing Provider   oxyCODONE-acetaminophen (Percocet) 7.5-325 mg per tablet Take 1-2 Tablets by mouth every six (6) hours as needed for Pain for up to 7 days. Max Daily Amount: 8 Tablets. 1/16/23 1/23/23  Joey Tran PA-C   docusate sodium (Colace) 100 mg capsule Take 1 Capsule by mouth two (2) times a day for 90 days. 1/16/23 4/16/23  Joey Tran PA-C   aspirin delayed-release 81 mg tablet Take 1 Tablet by mouth two (2) times a day. 1/16/23   Joey Tran PA-C   cefadroxil (DURICEF) 500 mg capsule Take 1 Capsule by mouth two (2) times a day. 1/16/23   Joey Tran PA-C   celecoxib (CeleBREX) 200 mg capsule Take 1 Capsule by mouth two (2) times a day for 90 days. 1/16/23 4/16/23  Joey Tran PA-C   ondansetron hcl (Zofran) 4 mg tablet Take 1 Tablet by mouth every eight (8) hours as needed for Nausea or Vomiting. 1/16/23   Joey Tran PA-C   levothyroxine (SYNTHROID) 175 mcg tablet Take 175 mcg by mouth Daily (before breakfast).     Provider, Historical   amLODIPine (NORVASC) 10 mg tablet Take  by mouth daily. Provider, Historical   atorvastatin (LIPITOR) 10 mg tablet Take  by mouth daily. Provider, Historical   hydroCHLOROthiazide (HYDRODIURIL) 25 mg tablet Take 25 mg by mouth daily. Provider, Historical   insulin glargine (LANTUS,BASAGLAR) 100 unit/mL (3 mL) inpn 50 Units by SubCUTAneous route daily. Provider, Historical   losartan (COZAAR) 100 mg tablet Take 100 mg by mouth daily. Provider, Historical   cholecalciferol (VITAMIN D3) 25 mcg (1,000 unit) cap Take  by mouth daily. Provider, Historical   potassium chloride SA (MICRO-K) 10 mEq capsule Take 10 mEq by mouth daily. Provider, Historical   metFORMIN (GLUCOPHAGE) 1,000 mg tablet Take 1,000 mg by mouth two (2) times daily (with meals). Provider, Historical       FAMILY HISTORY:   Family History   Problem Relation Age of Onset    No Known Problems Mother     Diabetes Father     Hypertension Father        SOCIAL HISTORY:   Social History     Socioeconomic History    Marital status:    Tobacco Use    Smoking status: Never    Smokeless tobacco: Never   Vaping Use    Vaping Use: Never used   Substance and Sexual Activity    Alcohol use: Yes    Drug use: Never     Social Determinants of Health     Physical Activity: Inactive    Days of Exercise per Week: 0 days    Minutes of Exercise per Session: 0 min       ROS:No CP, No SOB, No fever/chills nor night sweats. No headaches, vision abnormalities to include double and or loss of vision. No dizziness. No hearing abnormalities. No Chest Pain nor Shortness of breath. Pt denies h/o spinal surgery, injections, or PT/chiropractor. Patient has attempted self treatment with less than adequate relief on oral and topical analgesic / anti inflammatory medications . Pt denies change in bowel or bladder habits. No saddle paresthesia / anesthesia. Pt denies fever, unplanned weight loss / weight gains, and no skin changes. Musculoskeletal pain per HPI. Pain is exacerbated positionally. PHYSICAL EXAM:    Visit Vitals  Pulse 95   Temp 97.8 °F (36.6 °C) (Temporal)   Ht 6' (1.829 m)   Wt 255 lb (115.7 kg)   SpO2 100%   BMI 34.58 kg/m²       Constitutional: Appears well-developed and well-nourished. No distress. Sitting comfortably in the exam room, interacting with conversation with pleasant affect. Gait appears steady and patient exhibits no evidence of ataxia. Patient is able to ambulate with caution to go for post rolling walker. . No focal neurological deficit noted. No facial droop, slurred speech, or evidence of altered mentation noted on exam.   Skin: Skin over the head, neck, bilateral limbs, and trunk is warm and dry. No rash or erythema noted. Cranial Nerves II-XII grossly intact  HENT: NC/AT. Normal symmetry, bulk and tone of facial and neck musculature. Trachea midline. No discernible thyromegaly or masses. No involuntary movements. Lymphatic: No preauricular, submandibuar, anterior or posterior cervical lymphadenopathy. Psychiatric: The patient is awake, alert, and oriented to person, place and time. Behavior is normal. Thought content normal.   Cardiovascular: No clubbing, cyanosis. No edema bilateral lower extremities. Pulmonary: No tripoding nor accessory muscle recruitment. Breathing normally, no distress, no audible wheezing. Distal cap refill intact at 2/2 Jase UE / LE. Neuro intact Jase UE/LE to noxious stimuli        Ortho Specific exam:    Left knee over the midline in a craniocaudal orientation reveals a 19 cm surgical incision intact with wound borders well approximated. Skin staples are noted. Wound is clean and dry. Diffuse redness associated with the medial proximal third of the incision measuring from the center line to its greatest length at about 2.5 cm and in vertical height roughly about 2 cm. There is no gross indurations or fluctuance noted throughout the entire knee surgical site. Patient has active flexion to 85 degrees.     He was placed supine and his knee was flexed passively at 20 degrees. Patient had no active extension with pain reproduced at the insertion of the quad tendon. Palpable concavity noted superior pole of the patella consistent with the insertion of the quad tendon structure. MCL LCL intact no laxity or pain or giveaway noted. Patellar tendon nontender however patient did reproduce palpable tenderness over the tibial tuberosity. Radiological: CT on chart from [address]:      ICD-10-CM ICD-9-CM    1. Postoperative pain  G89.18 338.18 AMB SUPPLY ORDER      2. Encounter for postoperative wound check  Z48.89 V58.49 AMB SUPPLY ORDER      3. Redness and swelling of knee  M25.469 719.06 AMB SUPPLY ORDER    R23.8 782.9       4. Quadriceps tendon rupture, left, initial encounter  S76.112A 843.8 MRI KNEE LT WO CONT      AMB SUPPLY ORDER      5. Cellulitis of left knee  L03.116 682.6            PLAN: Today we discussed alternatives care to include but not limited to a stat MRI of the left knee for 100% confirmation of internal derangement of the quadriceps/patellar tendon. Patient was placed in a straight leg immobilizer today. He will use 24/7. He would continue for post rolling walker assisted ambulation of. MRI follow-up will be provided once the study is available review and comment. Today all of he and his wife's questions were answered to their satisfaction. No additional oral antibiotics at this time until MRI can be reviewed. Appropriate for outpatient management. Will discuss supportive treatment, NSAIDS, RICE and orthopedic follow-up. Discussed treatment plan, return precautions, symptomatic relief, and expected time to improvement. All questions answered. Patient is stable for discharge and outpatient management. Medication use, risk/benefit, side effects, and precautions discussed. Care plan outlined and precautions discussed. Results were reviewed with the patient. All medications were reviewed with the patient. All of pt's questions and concerns were addressed. Alarm symptoms and return precautions associated with chief complaint and evaluation were reviewed with the patient in detail. The patient demonstrated adequate understanding. The patient expresses willing compliance with the treatment plan. Special note: Medication management discussed in detail all patient's questions answered to their satisfaction. Patient provided a reminder for a \"due or due soon\" health maintenance. I have asked the patient to schedule an appointment with their primary care provider for follow-up on general health maintenance concerns. Today all the patient's questions were answered to their satisfaction. Copies of x-rays reviewed if obtained this visit, and provided to patient. Dictation disclaimer:  Please note that this dictation was completed with [employer], the computer voice recognition software. Quite often unanticipated grammatical, syntax, homophones, and other interpretive errors are inadvertently transcribed by the computer software. Please disregard these errors. Please excuse any errors that have escaped final proofreading. Lisa JONES, APC, MPAS, PA-C  [facility]hatiaRobert [facility]

## 2023-01-25 ENCOUNTER — OFFICE VISIT (OUTPATIENT)
Dept: ORTHOPEDIC SURGERY | Age: 58
End: 2023-01-25
Payer: OTHER GOVERNMENT

## 2023-01-25 VITALS — TEMPERATURE: 98.4 F

## 2023-01-25 DIAGNOSIS — M25.462 SWELLING OF JOINT OF LEFT KNEE: ICD-10-CM

## 2023-01-25 DIAGNOSIS — Z96.652 S/P TKR (TOTAL KNEE REPLACEMENT), LEFT: Primary | ICD-10-CM

## 2023-01-25 NOTE — PROGRESS NOTES
Patient: Manjit Mark                MRN: [id number]       SSN: [id number]  YOB: 1965        AGE: 62 y.o. SEX: male  There is no height or weight on file to calculate BMI. PCP: Jacob Padron MD  01/25/23      Manjit Mark presents today for post-op follow-up of his left knee. He had a left TKR on 1/16/23. Mr. Tien Frost is accompanied by his wife. He is wearing a brace and using a two-wheeled walker at this visit. He states that he did not have any falls or trauma since his surgery. He states that he did not straighten his leg in the hospital but was able to see PT the next day and go up and down a ladder. He reports that he has some pain but it is not bad today. He states that he is taking pain medication but has not taken it today. He states he is taking Aspirin for blood thinners at this time. We discussed together that I suspect he is having some weakness after the nerve block in the knee. I encouraged him to let the leg hang and bend backward at least once an hour. I also encouraged him to take pain medications. I instructed him to stop taking the Aspirin due to swelling and to ice the knee often. We also discussed weaning him off of the knee brace.      Semination today I cannot palpate a defect in the quads tendon he is got moderate swelling there is no evidence for infection or DVT the wound is beautifully healed in and calf nontender there is no foot drop tib ant EHL 5 out of 5 he is got some quads inhibition is actually his right leg is actually very numb at L3-4 he is able to stand unassisted on the left leg and he has not been taking much of the way of pain medication his wife is very supportive    At this point in time I think he is fine I recommend straightening and bending on an hourly basis to avoid arthrofibrosis his range of motion today is -4 to 88 degrees and I like him to take pain medication stop the aspirin and ice we will see him back next week for staple removal I think is going to do fine he does have quite profound numbness on the opposite leg from all his back surgeries and is able to walk adequately I think he is doing fine at this point and just pain inhibition more than anything and some anxiety will get him through this so we will see him next week    He states that after several spinal surgeries he is numb in his lower legs often. I personally reviewed the MRI of the left knee, obtained 1/23/23. Negative. No tear in quads tendon, some edema    PLAN:   - Discussion regarding surgery, decided that it is not recommended at this time. - Return in 1 week     REVIEW OF SYSTEMS:      CON: negative  EYE: negative   ENT: negative  RESP: negative  GI:    negative   :  negative  MSK: Positive  A twelve point review of systems was completed, positives noted and all other systems were reviewed and are negative          Past Medical History:   Diagnosis Date    Chronic back pain     Diabetes ([facility] [address]     Hypertension     Unspecified sleep apnea     Use CPAP       Family History   Problem Relation Age of Onset    No Known Problems Mother     Diabetes Father     Hypertension Father        Current Outpatient Medications   Medication Sig Dispense Refill    docusate sodium (Colace) 100 mg capsule Take 1 Capsule by mouth two (2) times a day for 90 days. 60 Capsule 2    aspirin delayed-release 81 mg tablet Take 1 Tablet by mouth two (2) times a day. 60 Tablet 1    cefadroxil (DURICEF) 500 mg capsule Take 1 Capsule by mouth two (2) times a day. 14 Capsule 0    celecoxib (CeleBREX) 200 mg capsule Take 1 Capsule by mouth two (2) times a day for 90 days. 60 Capsule 2    ondansetron hcl (Zofran) 4 mg tablet Take 1 Tablet by mouth every eight (8) hours as needed for Nausea or Vomiting. 30 Tablet 1    levothyroxine (SYNTHROID) 175 mcg tablet Take 175 mcg by mouth Daily (before breakfast). amLODIPine (NORVASC) 10 mg tablet Take  by mouth daily. atorvastatin (LIPITOR) 10 mg tablet Take  by mouth daily. hydroCHLOROthiazide (HYDRODIURIL) 25 mg tablet Take 25 mg by mouth daily. insulin glargine (LANTUS,BASAGLAR) 100 unit/mL (3 mL) inpn 50 Units by SubCUTAneous route daily. losartan (COZAAR) 100 mg tablet Take 100 mg by mouth daily. cholecalciferol (VITAMIN D3) 25 mcg (1,000 unit) cap Take  by mouth daily. potassium chloride SA (MICRO-K) 10 mEq capsule Take 10 mEq by mouth daily. metFORMIN (GLUCOPHAGE) 1,000 mg tablet Take 1,000 mg by mouth two (2) times daily (with meals). No Known Allergies    Past Surgical History:   Procedure Laterality Date    HC KNEE ARTHROSCOPY/SURGERY Left     [place] UNLISTED NEUROLOGICAL/NEUROMUSCULAR DX PX      spinal fusion X10 Lumbar region       Social History     Socioeconomic History    Marital status:      Spouse name: Not on file    Number of children: Not on file    Years of education: Not on file    Highest education level: Not on file   Occupational History    Not on file   Tobacco Use    Smoking status: Never    Smokeless tobacco: Never   Vaping Use    Vaping Use: Never used   Substance and Sexual Activity    Alcohol use: Yes    Drug use: Never    Sexual activity: Not on file   Other Topics Concern    Not on file   Social History Narrative    Not on file     Social Determinants of Health     Financial Resource Strain: Not on file   Food Insecurity: Not on file   Transportation Needs: Not on file   Physical Activity: Inactive    Days of Exercise per Week: 0 days    Minutes of Exercise per Session: 0 min   Stress: Not on file   Social Connections: Not on file   Intimate Partner Violence: Not At Risk    Fear of Current or Ex-Partner: No    Emotionally Abused: No    Physically Abused: No    Sexually Abused: No   Housing Stability: Not on file       There were no vitals taken for this visit.       PHYSICAL EXAMINATION:  GENERAL: Alert and oriented x3, in no acute distress, well-developed, well-nourished, afebrile. HEART: No JVD. EYES: No scleral icterus   NECK: No significant lymphadenopathy   LUNGS: No respiratory compromise or indrawing  ABDOMEN: Soft, non-tender, non-distended. Note: This note was completed using voice recognition software.  Any typographical/name errors or mistakes are unintentional.    Written by: Agustina Easley, as dictated by Wesley Woods MD.    Electronically signed by: Rich Deshpande

## 2023-01-26 ENCOUNTER — TELEPHONE (OUTPATIENT)
Dept: ORTHOPEDIC SURGERY | Age: 58
End: 2023-01-26

## 2023-01-26 DIAGNOSIS — Z96.652 S/P TKR (TOTAL KNEE REPLACEMENT), LEFT: Primary | ICD-10-CM

## 2023-01-26 DIAGNOSIS — G89.18 POST-OP PAIN: ICD-10-CM

## 2023-01-26 RX ORDER — OXYCODONE AND ACETAMINOPHEN 7.5; 325 MG/1; MG/1
1-2 TABLET ORAL
Qty: 42 TABLET | Refills: 0 | Status: SHIPPED | OUTPATIENT
Start: 2023-01-26 | End: 2023-02-02

## 2023-01-26 NOTE — TELEPHONE ENCOUNTER
Patient called requesting a refill on his pain medication. Pharmacy is [facility] on [address]  In [place]. Please call him when done.

## 2023-02-02 ENCOUNTER — OFFICE VISIT (OUTPATIENT)
Dept: ORTHOPEDIC SURGERY | Age: 58
End: 2023-02-02
Payer: OTHER GOVERNMENT

## 2023-02-02 DIAGNOSIS — Z96.652 S/P TKR (TOTAL KNEE REPLACEMENT), LEFT: Primary | ICD-10-CM

## 2023-02-02 DIAGNOSIS — Z48.02 ENCOUNTER FOR STAPLE REMOVAL: ICD-10-CM

## 2023-02-02 PROCEDURE — 99024 POSTOP FOLLOW-UP VISIT: CPT | Performed by: PHYSICIAN ASSISTANT

## 2023-02-02 RX ORDER — OXYCODONE AND ACETAMINOPHEN 7.5; 325 MG/1; MG/1
1-2 TABLET ORAL
Qty: 42 TABLET | Refills: 0 | Status: SHIPPED | OUTPATIENT
Start: 2023-02-02 | End: 2023-02-09

## 2023-02-02 NOTE — LETTER
NOTIFICATION RETURN TO WORK     2/2/2023 8:48 AM    Mr. Mervyn Schaumann  [address] [facility] [address]      To Whom It May Concern:    Mervyn Schaumann is currently under the care of [address] [place] [address]. He is okay to return to tele-work for approximately 5 to 6 months. If there are questions or concerns please have the patient contact our office.         Sincerely,      Riley Vicente PA-C

## 2023-02-10 ENCOUNTER — HOSPITAL ENCOUNTER (OUTPATIENT)
Dept: PHYSICAL THERAPY | Age: 58
Discharge: HOME | End: 2023-02-10
Attending: PHYSICIAN ASSISTANT
Payer: OTHER GOVERNMENT

## 2023-02-10 PROCEDURE — 97162 PT EVAL MOD COMPLEX 30 MIN: CPT

## 2023-02-10 PROCEDURE — 97016 VASOPNEUMATIC DEVICE THERAPY: CPT

## 2023-02-10 PROCEDURE — 97110 THERAPEUTIC EXERCISES: CPT

## 2023-02-10 PROCEDURE — 97530 THERAPEUTIC ACTIVITIES: CPT

## 2023-02-10 NOTE — PROGRESS NOTES
[facility] - DAILY TREATMENT NOTE    Patient Name: Mira Agrawal        Date: 2/10/2023  : 1965   YES Patient  Verified  Visit #:     Insurance: Payor: [facility] CCN / Plan: [employer]SI [facility] CCN / Product Type: Federal Funded Programs /      In time: 6915 Out time: 1130   Total Treatment Time: 50     TREATMENT AREA =  Left Knee    SUBJECTIVE    Pain Level (on 0 to 10 scale):  4  / 10   Medication Changes/New allergies or changes in medical history, any new surgeries or procedures? 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    min [] Estim, type/location:                                      []  att     []  unatt     []  w/US     []  w/ice    []  w/heat    min []  Mechanical Traction: type/lbs                   []  pro   []  sup   []  int   []  cont    []  before manual    []  after manual    min []  Ultrasound, settings/location:      min []  Iontophoresis w/ dexamethasone, location:                                               []  take home patch-6hour remove at        []  in clinic    min []  Ice     []  Heat    location/position:    15 min [x]  Vasopneumatic Device, press/temp: Left Knee, Mod Pressure, Max Cold    min []  Other:    [x] Skin assessment post-treatment (if applicable):    [x]  intact    []  redness- no adverse reaction     []redness - adverse reaction:      10 min Therapeutic Exercise:  [x]  See flow sheet   Rationale:      increase ROM and increase strength to improve the patients ability to negotiate various environments in a safe and effective manner. 10 min Therapeutic Activity: See flow sheet   Rationale: To improve safety, proprioception, coordination, and efficiency with tasks such as stair negotiation, transfers, bed mobility, and lifting mechanics     min Patient Education:  YES  Reviewed HEP   []  Progressed/Changed HEP based on: Other Objective/Functional Measures:    See Above     Post Treatment Pain Level (on 0 to 10) scale:   2  / 10     ASSESSMENT    Assessment/Changes in Function:     See POC    Justification for Eval Code Complexity:  Patient History (low 0, mod 1-2, high 3-4): Hih  Examination (low 1-2, mod 3+, high 4+): Mod (see above)  Clinical Presentation (low stable or uncomplicated, mod evolving or changing, high unstable or unpredictable): Mod  Clinical Decision Making (low , mod 26-74, high 1-25): FOTO = Mod     []  See Progress Note/Recertification   Patient will continue to benefit from skilled PT services to analyze,, cue,, progress,, modify,, demonstrate,, instruct, and address, movement patterns,, therapeutic interventions,, postural abnormalities,, soft tissue restrictions,, ROM,, strength,, functional mobility,, body mechanics/ergonomics, and home and community integration, to attain remaining goals. Progress toward goals / Updated goals:    See POC     PLAN    []  Upgrade activities as tolerated YES Continue plan of care   []  Discharge due to :    []  Other:      Therapist: Alec Zelaya DPT, OCS, Cert.  DN    Date: 2/10/2023 Time: 10:21 AM     Future Appointments   Date Time Provider Jaci Vital   2/10/2023 10:30 AM Cherry Snare [facility]

## 2023-02-10 NOTE — PROGRESS NOTES
[address] [place] [address] PHYSICAL THERAPY  [address] [facility] [address], [facility], [address] - Phone: [phone]  Fax: [phone] / 8286 [facility]  Patient Name: Mellisa Forbes : 1965   Medical   Diagnosis: Left knee pain [M25.562]  Postoperative pain of left knee [G89.18, M25.562] Treatment Diagnosis: Left TKA   Onset Date: DOI: 10 years ago  DOS: 2023     Referral Source: Sofia Dailey Start of Care [facility]): 2/10/2023   Prior Hospitalization: See medical history Provider #: [id number]   Prior Level of Function: Using [facility]   Comorbidities: B ACL Repair (left ,right ), Diabetes, HTN, 10 Spinal Fusion  -    Medications: Verified on Patient Summary List   The Plan of Care and following information is based on the information from the initial evaluation.   ==========================================================================================  Assessment / key information:  Pt is a 62year old male who presents to PT today with left knee pain secondary to TKA. The resulting condition has affected their ability to walk for prolonged distance, walk on uneven surfaces and lift heavy objects. Patient with a Functional Status score of 47 on FOTO (Focused on Therapeutic Outcomes), which corresponds to a functional limitation of 53%. Patient will benefit from skilled PT services to address these issues. Pt was provided a HEP today and educated regarding their diagnosis and prognosis.  Thank you for this referral.        Physical Therapy Evaluation - Knee       Gait: ambulates with SPC                                                                                   AROM                      PROM                   Strength (1-5)      Left Right Left Right Left Right   Hip Flexion (0-120)         2 5   Knee Flexion (0-135) 86 120 92 NT 5 5     Extension (0) 17 3 15 NT 4 5   Notes: minimal extensor lag c SLR     Girth Measurements in cm:      at  midpatella   Left 46cm    Right   42cm       ==========================================================================================  Eval Complexity: History: HIGH Complexity :3+ comorbidities / personal factors will impact the outcome/ POC Exam:MEDIUM Complexity : 3 Standardized tests and measures addressing body structure, function, activity limitation and / or participation in recreation  Presentation: MEDIUM Complexity : Evolving with changing characteristics  Clinical Decision Making:MEDIUM Complexity : FOTO score of 26-74Overall Complexity:MEDIUM    Problem List: pain affecting function, decrease ROM, decrease strength, edema affecting function, impaired gait/ balance, decrease ADL/ functional abilitiies, decrease activity tolerance, decrease flexibility/ joint mobility, and decrease transfer abilities   Treatment Plan may include any combination of the following: Therapeutic exercise, Neuromuscular reeducation, Manual therapy, Therapeutic activity, Self care/home management, Electric stim unattended , Vasopneumatic device, Gait training, and Electric stim attended  Patient / Family readiness to learn indicated by: asking questions and trying to perform skills  Persons(s) to be included in education: patient (P)  Barriers to Learning/Limitations: None  Patient Goal (s): \"better mobility   Patient self reported health status: good  Rehabilitation Potential: good  Short Term Goals: To be accomplished in  3  weeks:  1. Pt will be compliant with HEP for symptom management at home. 2. Pt will demonstrate left knee extension AROM to at least 9 deg from neutral to improve stance phase. 3. Pt will demonstrate left knee flexion AROM to at least 95 to improve gait training. 4. Pt will increase FOTO Functional Status score to 57 to improve rehabilitative outcomes. Long Term Goals: To be accomplished in  6  weeks:  1.  Pt will be independent with HEP at D/C for self management. 2. Pt will demonstrate left knee flexion AROM to at least 105 to improve stair negotiation. 3. Pt will ambulate 1 mile to improve quality of life. 4. Pt will increase FOTO Functional Status score to 64 to decrease functional limitations. 5. Pt will demonstrate left knee extension strength of at least 5/5 to engage in age appropriate activities. Frequency / Duration:   Patient to be seen  2-3  times per week for 6  weeks:  Patient / Caregiver education and instruction: provided education regarding diagnosis and prognosis as well as details regarding treatment plain. activity modification and exercises  Therapist Signature: Toro Sequeira DPT, OCS, Cert. DN Date: 0/70/1028   Certification Period: NA Time: 10:15 AM   ===========================================================================================  I certify that the above Physical Therapy Services are being furnished while the patient is under my care. I agree with the treatment plan and certify that this therapy is necessary. Physician Signature:        Date:       Time:     Evelyn rOdoñez PA-C  Please sign and return to [employer] or you may fax the signed copy to [phone]. Thank you.

## 2023-02-14 ENCOUNTER — HOSPITAL ENCOUNTER (OUTPATIENT)
Facility: HOSPITAL | Age: 58
Setting detail: RECURRING SERIES
Discharge: HOME | End: 2023-02-17
Payer: OTHER GOVERNMENT

## 2023-02-14 PROCEDURE — 97110 THERAPEUTIC EXERCISES: CPT

## 2023-02-14 PROCEDURE — 97016 VASOPNEUMATIC DEVICE THERAPY: CPT

## 2023-02-14 NOTE — PROGRESS NOTES
PHYSICAL / OCCUPATIONAL THERAPY - DAILY TREATMENT NOTE (updated )    Patient Name: Gattis Duverney    Date: 2023    : 1965  Insurance: Payor: Jose Son / Plan: Jose Son / Product Type: *No Product type* /      Patient  verified yes     Visit #   Current / Total 2 18   Time   In / Out 4:00 4:46   Pain   In / Out 2/10 46   Subjective Functional Status/Changes: Reports compliance with HEP   Changes to:  Meds, Allergies, Med Hx, Sx Hx? If yes, update Summary List no       TREATMENT AREA =  Left TKA    OBJECTIVE    Modalities Rationale:     decrease edema and decrease inflammation to improve patient's ability to progress to PLOF and address remaining functional goals. min [] Estim Unattended, type/location:                                      []  w/ice    []  w/heat    min [] Estim Attended, type/location:                                     []  w/US     []  w/ice    []  w/heat    []  TENS insruct      min []  Mechanical Traction: type/lbs                   []  pro   []  sup   []  int   []  cont    []  before manual    []  after manual    min []  Ultrasound, settings/location:      min []  Iontophoresis w/ dexamethasone, location:                                               []  take home patch       []  in clinic    min  unbilled []  Ice     []  Heat    location/position:     min []  Paraffin,  details:    10 min [x]  Vasopneumatic Device, press/temp: Medium pressure/ coldest temp    min []  Renetta Chua / Salvatore Late: If using vaso (only need to measure limb vaso being performed on)      pre-treatment girth : 43.0 cm      post-treatment girth : 41.0 cm      measured at (landmark location) :  joint line    min []  Other:    Skin assessment post-treatment (if applicable):    [x]  intact    [x]  redness- no adverse reaction                 []redness - adverse reaction:        Therapeutic Procedures:   Tx Min Billable or 1:1 Min (if diff from [employer]) Procedure, Rationale, Specifics   [id number] Therapeutic Exercise (timed):  increase ROM, strength, coordination, balance, and proprioception to improve patient's ability to progress to PLOF and address remaining functional goals. (see flow sheet as applicable)     Details if applicable:         [facility] Totals Reminder: bill using total billable min of TIMED therapeutic procedures (example: do not include dry needle or estim unattended, both untimed codes, in totals to left)  8-22 min = 1 unit; 23-37 min = 2 units; 38-52 min = 3 units; 53-67 min = 4 units; 68-82 min = 5 units   Total Total     [x]  Patient Education billed concurrently with other procedures   [x] Review HEP    [] Progressed/Changed HEP, detail:    [] Other detail:       Objective Information/Functional Measures/Assessment    Left knee AAROM flexion to 100 deg during Lemos Grundy. Added standing TKE and incline stretch    Patient will continue to benefit from skilled PT / OT services to modify and progress therapeutic interventions, analyze and address functional mobility deficits, analyze and address ROM deficits, analyze and address strength deficits, analyze and address soft tissue restrictions, analyze and cue for proper movement patterns, analyze and modify for postural abnormalities, and instruct in home and community integration to address functional deficits and attain remaining goals. Progress toward goals / Updated goals:  []  See Progress Note/Recertification    Short Term Goals: To be accomplished in  3  weeks:  1. Pt will be compliant with HEP for symptom management at home. Reports compliance with HEP. 2. Pt will demonstrate left knee extension AROM to at least 9 deg from neutral to improve stance phase. 3. Pt will demonstrate left knee flexion AROM to at least 95 to improve gait training. Left knee AAROM to 100 deg  4. Pt will increase FOTO Functional Status score to 57 to improve rehabilitative outcomes. Long Term Goals: To be accomplished in  6  weeks:  1.  Pt will be independent with HEP at D/C for self management. 2. Pt will demonstrate left knee flexion AROM to at least 105 to improve stair negotiation. 3. Pt will ambulate 1 mile to improve quality of life. 4. Pt will increase FOTO Functional Status score to 64 to decrease functional limitations. 5. Pt will demonstrate left knee extension strength of at least 5/5 to engage in age appropriate activities.      PLAN  yes Continue plan of care  [x]  Upgrade activities as tolerated  []  Discharge due to :  []  Other:    Noemi Guerra PT    2/14/2023    4:10 PM    Future Appointments   Date Time Provider Gucci Lawrence   2/15/2023  2:30 PM Susan Solano PT [facility]   2/17/2023  1:30 PM Susan Solano PT [facility]   2/21/2023  5:00 PM Noemi Guerra PT [facility]   2/22/2023  3:00 PM Kranthi Dorado PTA [facility]   2/23/2023  2:00 PM Susan Solano, [address] [facility]   2/27/2023  4:00 PM MASON Padilla [facility]   2/28/2023  4:00 PM JESSICA Demarco [facility]

## 2023-02-15 ENCOUNTER — APPOINTMENT (OUTPATIENT)
Facility: HOSPITAL | Age: 58
End: 2023-02-15
Payer: OTHER GOVERNMENT

## 2023-02-15 ENCOUNTER — HOSPITAL ENCOUNTER (OUTPATIENT)
Facility: HOSPITAL | Age: 58
Setting detail: RECURRING SERIES
Discharge: HOME | End: 2023-02-18
Payer: OTHER GOVERNMENT

## 2023-02-15 PROCEDURE — 97110 THERAPEUTIC EXERCISES: CPT

## 2023-02-15 PROCEDURE — 97016 VASOPNEUMATIC DEVICE THERAPY: CPT

## 2023-02-15 NOTE — PROGRESS NOTES
PHYSICAL / OCCUPATIONAL THERAPY - DAILY TREATMENT NOTE (updated )    Patient Name: Frances Green    Date: 2/15/2023    : 1965  Insurance: Payor: [employer] / Plan: [employer] / Product Type: *No Product type* /      Patient  verified yes     Visit #   Current / Total 3 18   Time   In / Out 222 328   Pain   In / Out 2 1   Subjective Functional Status/Changes: Kiko Akbar reports that he is experiencing more pain and swelling due to completing therapy yesterday. Changes to:  Meds, Allergies, Med Hx, Sx Hx? If yes, update Summary List no       TREATMENT AREA =  Left TKA    OBJECTIVE    Modalities Rationale:     decrease edema and decrease inflammation to improve patient's ability to progress to PLOF and address remaining functional goals. min [] Estim Unattended, type/location:                                                            []  w/ice    []  w/heat     min [] Estim Attended, type/location:                                                           []  w/US     []  w/ice    []  w/heat    []  TENS insruct       min []  Mechanical Traction: type/lbs                    []  pro   []  sup   []  int   []  cont    []  before manual    []  after manual     min []  Ultrasound, settings/location:        min []  Iontophoresis w/ dexamethasone, location:                                                []  take home patch       []  in clinic     min  unbilled []  Ice     []  Heat    location/position:       min []  Paraffin,  details:     15 min [x]  Vasopneumatic Device, press/temp: Medium pressure/ coldest temp     min []  Ivette Ponce / Soco Harvinder:      If using vaso (only need to measure limb vaso being performed on)      pre-treatment girth : 46.0 cm      post-treatment girth : 45.0 cm      measured at (landmark location) :  joint line     min []  Other:     Skin assessment post-treatment (if applicable):    [x]  intact    [x]  redness- no adverse reaction                 []redness - adverse reaction:        Therapeutic Procedures: Tx Min Procedure, Rationale, Specifics   49 [id number] Therapeutic Exercise (timed):  increase ROM, strength, coordination, balance, and proprioception to improve patient's ability to progress to PLOF and address remaining functional goals. (see flow sheet as applicable)     Details if applicable:        [facility] Totals Reminder: bill using total billable min of TIMED therapeutic procedures (example: do not include dry needle or estim unattended, both untimed codes, in totals to left)  8-22 min = 1 unit; 23-37 min = 2 units; 38-52 min = 3 units; 53-67 min = 4 units; 68-82 min = 5 units   Total     [x]  Patient Education billed concurrently with other procedures   [x] Review HEP    [] Progressed/Changed HEP, detail:    [] Other detail:       Objective Information/Functional Measures/Assessment    Pt tolerated session poorly secondary to completing PT yesterday. He was educated that consecutive appointment were not ideal. He agreed to adjusting schedule. Required mod/min cuing throughout session. Difficulty with flexion and extension end range of motion. Patient will continue to benefit from skilled PT / OT services to modify and progress therapeutic interventions, analyze and address functional mobility deficits, analyze and address ROM deficits, analyze and address strength deficits, analyze and address soft tissue restrictions, analyze and cue for proper movement patterns, analyze and modify for postural abnormalities, and instruct in home and community integration to address functional deficits and attain remaining goals. Progress toward goals / Updated goals:  []  See Progress Note/Recertification    Short Term Goals: To be accomplished in  3  weeks:  1. Pt will be compliant with HEP for symptom management at home. Reports compliance with HEP. 2. Pt will demonstrate left knee extension AROM to at least 9 deg from neutral to improve stance phase.   3. Pt will demonstrate left knee flexion AROM to at least 95 to improve gait training. 4. Pt will increase FOTO Functional Status score to 57 to improve rehabilitative outcomes. Long Term Goals: To be accomplished in  6  weeks:  1. Pt will be independent with HEP at D/C for self management. 2. Pt will demonstrate left knee flexion AROM to at least 105 to improve stair negotiation. 3. Pt will ambulate 1 mile to improve quality of life. 4. Pt will increase FOTO Functional Status score to 64 to decrease functional limitations. 5. Pt will demonstrate left knee extension strength of at least 5/5 to engage in age appropriate activities.     added SAQ    PLAN  yes Continue plan of care  []  Upgrade activities as tolerated  []  Discharge due to :  []  Other:    Ganesh Houser, PT    2/15/2023    1:10 PM    Future Appointments   Date Time Provider Gucci Lawrence   2/15/2023  2:30 PM Ganesh Houser, PT [facility]   2/17/2023  1:30 PM Ganesh Houser, PT [facility]   2/21/2023  5:00 PM Michael Alves, PT [facility]   2/22/2023  3:00 PM Jhon Landa, PTA [facility]   2/23/2023  2:00 PM Ganesh Houser, PT [facility]   2/27/2023  4:00 PM Arias Larson, PTA [facility]   2/28/2023  4:00 PM Michael Alves, PT [facility]

## 2023-02-17 ENCOUNTER — HOSPITAL ENCOUNTER (OUTPATIENT)
Facility: HOSPITAL | Age: 58
Setting detail: RECURRING SERIES
Discharge: HOME | End: 2023-02-20
Payer: OTHER GOVERNMENT

## 2023-02-17 ENCOUNTER — APPOINTMENT (OUTPATIENT)
Facility: HOSPITAL | Age: 58
End: 2023-02-17
Payer: OTHER GOVERNMENT

## 2023-02-17 PROCEDURE — 97016 VASOPNEUMATIC DEVICE THERAPY: CPT

## 2023-02-17 PROCEDURE — 97110 THERAPEUTIC EXERCISES: CPT

## 2023-02-17 PROCEDURE — 97112 NEUROMUSCULAR REEDUCATION: CPT

## 2023-02-17 NOTE — PROGRESS NOTES
PHYSICAL / OCCUPATIONAL THERAPY - DAILY TREATMENT NOTE (updated )    Patient Name: Zeus Peña    Date: 2023    : 1965  Insurance: Payor: Maurilio Lemus / Plan: Maurilio eLmus / Product Type: *No Product type* /      Patient  verified YES    Visit #   Current / Total 4 18   Time   In / Out 120 215   Pain   In / Out 1-2 0   Subjective Functional Status/Changes: Pt complaints of increased swelling and compliance with HEP   Changes to:  Meds, Allergies, Med Hx, Sx Hx? If yes, update Summary List no       TREATMENT AREA =  Left TKA    OBJECTIVE  Modalities Rationale:     decrease edema and decrease inflammation to improve patient's ability to progress to PLOF and address remaining functional goals. 10 min [x]  Vasopneumatic Device, press/temp: Medium pressure/ coldest temp     min []  Fani Dozier / Ileana Vera: If using vaso (only need to measure limb vaso being performed on)      pre-treatment girth : 46.0 cm      post-treatment girth : 45.0 cm      measured at (landmark location) :  joint line     min []  Other:     Skin assessment post-treatment (if applicable):    [x]  intact    [x]  redness- no adverse reaction                 []redness - adverse reaction:             Therapeutic Procedures: Tx Min Billable or 1:1 Min (if diff from Tx Min) Procedure, Rationale, Specifics   35 [id number] Therapeutic Exercise (timed):  increase ROM, strength, coordination, balance, and proprioception to improve patient's ability to progress to PLOF and address remaining functional goals.  (see flow sheet as applicable)     Details if applicable:    See flowsheet     10 [id number] Neuromuscular Re-Education (timed):  improve balance, coordination, kinesthetic sense, posture, core stability and proprioception to improve patient's ability to develop conscious control of individual muscles and awareness of position of extremities in order to progress to PLOF and address remaining functional goals. (see flow sheet as applicable)     Details if applicable:    See flowsheet   39 45 [facility] Totals Reminder: bill using total billable min of TIMED therapeutic procedures (example: do not include dry needle or estim unattended, both untimed codes, in totals to left)  8-22 min = 1 unit; 23-37 min = 2 units; 38-52 min = 3 units; 53-67 min = 4 units; 68-82 min = 5 units   Total Total     [x]  Patient Education billed concurrently with other procedures   [x] Review HEP    [] Progressed/Changed HEP, detail:    [] Other detail:       Objective Information/Functional Measures/Assessment   Pt grabbed underneath table when trying to sit up. He suffered a minor puncture wound     Flexion c heel slides: 100 degrees    Pt tolerated session well and demonstrated increased apprehension during movements. Patient will continue to benefit from skilled PT / OT services to modify and progress therapeutic interventions, analyze and address functional mobility deficits, analyze and address ROM deficits, analyze and address strength deficits, analyze and address soft tissue restrictions, analyze and cue for proper movement patterns, analyze and modify for postural abnormalities, and instruct in home and community integration to address functional deficits and attain remaining goals.     Progress toward goals / Updated goals:  []  See Progress Note/Recertification    Flexion improving    PLAN  yes Continue plan of care  []  Upgrade activities as tolerated  []  Discharge due to :  []  Other:    Nate Olvera, PT    2/17/2023    1:25 PM    Future Appointments   Date Time Provider Gucci Lawrence   2/17/2023  1:30 PM Nate Olvera PT [facility]   2/21/2023  5:00 PM Greg Burnette, PT [facility]   2/22/2023  3:00 PM Brandi Lei PTA [facility]   2/24/2023  2:00 PM Jose Mohamud PTA [facility]   2/27/2023  4:00 PM [facility]   2/28/2023  4:00 PM Greg Burnette, PT [facility]

## 2023-02-21 ENCOUNTER — APPOINTMENT (OUTPATIENT)
Facility: HOSPITAL | Age: 58
End: 2023-02-21
Payer: OTHER GOVERNMENT

## 2023-02-21 ENCOUNTER — HOSPITAL ENCOUNTER (OUTPATIENT)
Facility: HOSPITAL | Age: 58
Setting detail: RECURRING SERIES
Discharge: HOME | End: 2023-02-24
Payer: OTHER GOVERNMENT

## 2023-02-21 PROCEDURE — 97140 MANUAL THERAPY 1/> REGIONS: CPT

## 2023-02-21 PROCEDURE — 97016 VASOPNEUMATIC DEVICE THERAPY: CPT

## 2023-02-21 PROCEDURE — 97110 THERAPEUTIC EXERCISES: CPT

## 2023-02-21 NOTE — PROGRESS NOTES
PHYSICAL / OCCUPATIONAL THERAPY - DAILY TREATMENT NOTE (updated )    Patient Name: Lulu Workman    Date: 2023    : 1965  Insurance: Payor: Flory Shoulders / Plan: [facility] Shoulders / Product Type: *No Product type* /      Patient  verified yes     Visit #   Current / Total 5 18   Time   In / Out 4:54 5:44   Pain   In / Out -2/10 1/10   Subjective Functional Status/Changes: \"   Changes to:  Meds, Allergies, Med Hx, Sx Hx? If yes, update Summary List no       TREATMENT AREA =  Left TKA    OBJECTIVE    Modalities Rationale:     decrease edema and decrease inflammation to improve patient's ability to progress to PLOF and address remaining functional goals. min [] Estim Unattended, type/location:                                      []  w/ice    []  w/heat    min [] Estim Attended, type/location:                                     []  w/US     []  w/ice    []  w/heat    []  TENS insruct      min []  Mechanical Traction: type/lbs                   []  pro   []  sup   []  int   []  cont    []  before manual    []  after manual    min []  Ultrasound, settings/location:      min []  Iontophoresis w/ dexamethasone, location:                                               []  take home patch       []  in clinic    min  unbill []  Ice     []  Heat    location/position:     min []  Paraffin,  details:    10 min [x]  Vasopneumatic Device, press/temp: Medium compression, coldest temp    min []  [employer] / Yakelin Ashley: If using vaso (only need to measure limb vaso being performed on)      pre-treatment girth : 45.0 cm      post-treatment girth : 44.5 cm      measured at (landmark location) :  mid patella    min []  Other:    Skin assessment post-treatment (if applicable):    [x]  intact    [x]  redness- no adverse reaction                 []redness - adverse reaction:        Therapeutic Procedures:   Tx Min Billable or 1:1 Min (if diff from Tx Min) Procedure, Rationale, Specifics   32  15190 Therapeutic Exercise (timed):  increase ROM, strength, coordination, balance, and proprioception to improve patient's ability to progress to PLOF and address remaining functional goals. (see flow sheet as applicable)     Details if applicable:       8  75130 Manual Therapy (timed):  increase ROM and increase tissue extensibility to improve patient's ability to progress to PLOF and address remaining functional goals. The manual therapy interventions were performed at a separate and distinct time from the therapeutic activities interventions . (see flow sheet as applicable)     Details if applicable:  patellar mobs, anterior tibial joint mobs     [facility] Totals Reminder: bill using total billable min of TIMED therapeutic procedures (example: do not include dry needle or estim unattended, both untimed codes, in totals to left)  8-22 min = 1 unit; 23-37 min = 2 units; 38-52 min = 3 units; 53-67 min = 4 units; 68-82 min = 5 units   Total Total     [x]  Patient Education billed concurrently with other procedures   [x] Review HEP    [] Progressed/Changed HEP, detail:    [] Other detail:       Objective Information/Functional Measures/Assessment    Left knee AAROM extension to 5 deg    Performed quad sets with ankle on 1/2 foam roll and with gastroc stretch. Patient will continue to benefit from skilled PT / OT services to modify and progress therapeutic interventions, analyze and address functional mobility deficits, analyze and address ROM deficits, analyze and address strength deficits, analyze and address soft tissue restrictions, analyze and cue for proper movement patterns, analyze and modify for postural abnormalities, and instruct in home and community integration to address functional deficits and attain remaining goals. Progress toward goals / Updated goals:  []  See Progress Note/Recertification    Short Term Goals: To be accomplished in  3  weeks:  1. Pt will be compliant with HEP for symptom management at home. Reports compliance with HEP. 2. Pt will demonstrate left knee extension AROM to at least 9 deg from neutral to improve stance phase. See above  3. Pt will demonstrate left knee flexion AROM to at least 95 to improve gait training. 4. Pt will increase FOTO Functional Status score to 57 to improve rehabilitative outcomes. Long Term Goals: To be accomplished in  6  weeks:  1. Pt will be independent with HEP at D/C for self management. 2. Pt will demonstrate left knee flexion AROM to at least 105 to improve stair negotiation. 3. Pt will ambulate 1 mile to improve quality of life. 4. Pt will increase FOTO Functional Status score to 64 to decrease functional limitations. 5. Pt will demonstrate left knee extension strength of at least 5/5 to engage in age appropriate activities.      PLAN  yes Continue plan of care  [x]  Upgrade activities as tolerated  []  Discharge due to :  []  Other:    Lewis Hebert, PT    2/21/2023    5:05 PM    Future Appointments   Date Time Provider Gucci Lawrence   2/22/2023  3:00 PM Yvonne Myrick PTA [facility] [address]   2/24/2023  2:00 PM Edinson Chaparro PTA [facility] [address]   2/27/2023  4:00 PM Edinson Chaparro PTA [facility] [address]   2/28/2023  4:00 PM Lewis Hebert PT [facility] [address]

## 2023-02-22 ENCOUNTER — APPOINTMENT (OUTPATIENT)
Facility: HOSPITAL | Age: 58
End: 2023-02-22
Payer: OTHER GOVERNMENT

## 2023-02-22 ENCOUNTER — HOSPITAL ENCOUNTER (OUTPATIENT)
Facility: HOSPITAL | Age: 58
Setting detail: RECURRING SERIES
Discharge: HOME | End: 2023-02-25
Payer: OTHER GOVERNMENT

## 2023-02-22 PROCEDURE — 97110 THERAPEUTIC EXERCISES: CPT

## 2023-02-22 PROCEDURE — 97112 NEUROMUSCULAR REEDUCATION: CPT

## 2023-02-22 PROCEDURE — 97530 THERAPEUTIC ACTIVITIES: CPT

## 2023-02-22 NOTE — PROGRESS NOTES
PHYSICAL / OCCUPATIONAL THERAPY - DAILY TREATMENT NOTE (updated )    Patient Name: Shikha Olivarez    Date: 2023    : 1965  Insurance: Payor: Aakash Lavender / Plan: Aakash Lavender / Product Type: *No Product type* /      Patient  verified yes     Visit #   Current / Total 6 19   Time   In / Out 304 403   Pain   In / Out 0/10 010   Subjective Functional Status/Changes: \"I am not really hurting more stiff and swollen\"   Changes to:  Meds, Allergies, Med Hx, Sx Hx? If yes, update Summary List no       TREATMENT AREA =  No admission diagnoses are documented for this encounter. OBJECTIVE    Modalities Rationale:  10 min [x]  Vasopneumatic Device, press/temp: GR, max compression, 34 degs   If using vaso (only need to measure limb vaso being performed on)      pre-treatment girth : 52 cm      post-treatment girth : 49 cm       measured at (landmark location) :  mid patella     min []  Other:    Skin assessment post-treatment (if applicable):    [x]  intact    []  redness- no adverse reaction                 []redness - adverse reaction:        Therapeutic Procedures: Tx Min Billable or 1:1 Min (if diff from Tx Min) Procedure, Rationale, Specifics   37  03653 Therapeutic Exercise (timed):  increase ROM, strength, coordination, balance, and proprioception to improve patient's ability to progress to PLOF and address remaining functional goals. (see flow sheet as applicable)     Details if applicable:       10  37911 Neuromuscular Re-Education (timed):  improve balance, coordination, kinesthetic sense, posture, core stability and proprioception to improve patient's ability to develop conscious control of individual muscles and awareness of position of extremities in order to progress to PLOF and address remaining functional goals.  (see flow sheet as applicable)     Details if applicable:     12  12615 Therapeutic Activity (timed):  use of dynamic activities replicating functional movements to increase ROM, strength, coordination, balance, and proprioception in order to improve patient's ability to progress to PLOF and address remaining functional goals. (see flow sheet as applicable)     Details if applicable:     52  [facility] Totals Reminder: bill using total billable min of TIMED therapeutic procedures (example: do not include dry needle or estim unattended, both untimed codes, in totals to left)  8-22 min = 1 unit; 23-37 min = 2 units; 38-52 min = 3 units; 53-67 min = 4 units; 68-82 min = 5 units   Total Total     [x]  Patient Education billed concurrently with other procedures   [x] Review HEP    [] Progressed/Changed HEP, detail:  Issued BTB for HEP  [] Other detail:       Objective Information/Functional Measures/Assessment    VCs + demo to perform proper technique and for safety with TE  Added SB to knee flex to promote AROM  Initiated HS str at steps with no c/o p! Performs LLESLS tap ups on 6'' with no UE assist with no LOB  Progressed TE without signs of distress or increase in p! Patient will continue to benefit from skilled PT / OT services to modify and progress therapeutic interventions, analyze and address functional mobility deficits, analyze and address ROM deficits, analyze and address strength deficits, analyze and address soft tissue restrictions, analyze and cue for proper movement patterns, analyze and modify for postural abnormalities, and instruct in home and community integration to address functional deficits and attain remaining goals. Progress toward goals / Updated goals:  []  See Progress Note/Recertification  Short Term Goals: To be accomplished in  3  weeks:  1. Pt will be compliant with HEP for symptom management at home. Established HEP  2. Pt will demonstrate left knee extension AROM to at least 9 deg from neutral to improve stance phase. addressed with QS with heel prop  3. Pt will demonstrate left knee flexion AROM to at least 95 to improve gait training. Addressed with Knee flex with SB  4. Pt will increase FOTO Functional Status score to 57 to improve rehabilitative outcomes. Long Term Goals: To be accomplished in  6  weeks:  1. Pt will be independent with HEP at D/C for self management. 2. Pt will demonstrate left knee flexion AROM to at least 105 to improve stair negotiation. 3. Pt will ambulate 1 mile to improve quality of life. 4. Pt will increase FOTO Functional Status score to 64 to decrease functional limitations. 5. Pt will demonstrate left knee extension strength of at least 5/5 to engage in age appropriate activities.     PLAN  yes Continue plan of care  [x]  Upgrade activities as tolerated  []  Discharge due to :  []  Other:    Italia Benjamin PTA    2/22/2023    4:41 PM    Future Appointments   Date Time Provider Gucci Lawrence   2/24/2023  2:00 PM [address] [place] [address], PTA [facility]   2/27/2023  4:00 PM [address] [place] [address], PTA [facility]   2/28/2023  4:00 PM Kevin Sanders, PT [facility]

## 2023-02-23 ENCOUNTER — APPOINTMENT (OUTPATIENT)
Facility: HOSPITAL | Age: 58
End: 2023-02-23
Payer: OTHER GOVERNMENT

## 2023-02-24 ENCOUNTER — HOSPITAL ENCOUNTER (OUTPATIENT)
Facility: HOSPITAL | Age: 58
Setting detail: RECURRING SERIES
Discharge: HOME | End: 2023-02-27
Payer: OTHER GOVERNMENT

## 2023-02-24 PROCEDURE — 97110 THERAPEUTIC EXERCISES: CPT

## 2023-02-24 PROCEDURE — 97016 VASOPNEUMATIC DEVICE THERAPY: CPT

## 2023-02-24 PROCEDURE — 97140 MANUAL THERAPY 1/> REGIONS: CPT

## 2023-02-24 NOTE — PROGRESS NOTES
PHYSICAL / OCCUPATIONAL THERAPY - DAILY TREATMENT NOTE (updated )    Patient Name: Karma Fitzgerald    Date: 2023    : 1965  Insurance: Payor: Stewart Smith / Plan: Stewart Smith / Product Type: *No Product type* /      Patient  verified yes     Visit #   Current / Total 7 18   Time   In / Out 1:56 2:55   Pain   In / Out 1-2 1   Subjective Functional Status/Changes: Pt reports knee has been swollen since after the surgery, swelling doesn't fluctuate much and knee feels tight and stiff. Changes to:  Meds, Allergies, Med Hx, Sx Hx? If yes, update Summary List no       TREATMENT AREA =  left TKA    OBJECTIVE    Modalities Rationale:     decrease edema, decrease inflammation, and decrease pain to improve patient's ability to progress to PLOF and address remaining functional goals. min [] Estim Unattended, type/location:                                      []  w/ice    []  w/heat    min [] Estim Attended, type/location:                                     []  w/US     []  w/ice    []  w/heat    []  TENS insruct      min []  Mechanical Traction: type/lbs                   []  pro   []  sup   []  int   []  cont    []  before manual    []  after manual    min []  Ultrasound, settings/location:      min []  Iontophoresis w/ dexamethasone, location:                                               []  take home patch       []  in clinic    min  unbill []  Ice     []  Heat    location/position:     min []  Paraffin,  details:    10 min [x]  Vasopneumatic Device, press/temp: Medium pressure, coldest setting, Patient supine with LE wedge    min []  Trini Marques / Jade Seaman:     If using vaso (only need to measure limb vaso being performed on)      pre-treatment girth : 50.4 cm       post-treatment girth : 48.7 cm      measured at (landmark location) :  mid patella     min []  Other:    Skin assessment post-treatment (if applicable):    [x]  intact    []  redness- no adverse reaction []redness - adverse reaction:        Therapeutic Procedures: Tx Min Billable or 1:1 Min (if diff from Tx Min) Procedure, Rationale, Specifics   39  41399 Therapeutic Exercise (timed):  increase ROM, strength, coordination, balance, and proprioception to improve patient's ability to progress to PLOF and address remaining functional goals. (see flow sheet as applicable)     Details if applicable:       10  69089 Manual Therapy (timed):  decrease edema to improve patient's ability to progress to PLOF and address remaining functional goals. The manual therapy interventions were performed at a separate and distinct time from the therapeutic activities interventions . (see flow sheet as applicable)     Details if applicable:  lymphatic stimulation massage to decrease left LE edema    52  [facility] Totals Reminder: bill using total billable min of TIMED therapeutic procedures (example: do not include dry needle or estim unattended, both untimed codes, in totals to left)  8-22 min = 1 unit; 23-37 min = 2 units; 38-52 min = 3 units; 53-67 min = 4 units; 68-82 min = 5 units   Total Total     [x]  Patient Education billed concurrently with other procedures   [] Review HEP    [x] Progressed/Changed HEP, detail:  Pt to trial self lymphatic stimulation to address left LE edema. Also educated pt on option of compression sock (above knee) to manage edema. [] Other detail:       Objective Information/Functional Measures/Assessment    Note pt with edema distal left LE. Note mild pitting edema at shin. Circumference measurements left LE:  Top of patella: 48.7 cm   Top of scar: 52.1 cm   Bottom of scar: 42.4 cm   5 cm distal to bottom of scar: 43.2 cm    Performed lymphatic stimulation massage to include, B cervical nodes, abdominal nodes, left axillary nodes, left axillary-inguinal anastomosis and left LE. Educated pt on self lymphatic stimulation to address left LE edema.      Patient will continue to benefit from skilled PT services to modify and progress therapeutic interventions, analyze and address functional mobility deficits, analyze and address ROM deficits, analyze and address strength deficits, analyze and address soft tissue restrictions, analyze and cue for proper movement patterns, and instruct in home and community integration to address functional deficits and attain remaining goals. Progress toward goals / Updated goals:  []  See Progress Note/Recertification    Short Term Goals: To be accomplished in  3  weeks:  1. Pt will be compliant with HEP for symptom management at home. added self lymphatic stimulation to address edema left LE   2. Pt will demonstrate left knee extension AROM to at least 9 deg from neutral to improve stance phase. 3. Pt will demonstrate left knee flexion AROM to at least 95 to improve gait training. 4. Pt will increase FOTO Functional Status score to 57 to improve rehabilitative outcomes. Long Term Goals: To be accomplished in  6  weeks:  1. Pt will be independent with HEP at D/C for self management. 2. Pt will demonstrate left knee flexion AROM to at least 105 to improve stair negotiation. 3. Pt will ambulate 1 mile to improve quality of life. 4. Pt will increase FOTO Functional Status score to 64 to decrease functional limitations. 5. Pt will demonstrate left knee extension strength of at least 5/5 to engage in age appropriate activities.     PLAN  yes Continue plan of care  [x]  Upgrade activities as tolerated  []  Discharge due to :  []  Other:    Mercedes Ovalle, MASON    2/24/2023    3:06 PM    Future Appointments   Date Time Provider Gucci Lawrence   2/27/2023  4:00 PM Adali Conteh [facility]   2/28/2023  4:00 PM Sisi Luque PT [facility]

## 2023-02-27 ENCOUNTER — APPOINTMENT (OUTPATIENT)
Facility: HOSPITAL | Age: 58
End: 2023-02-27
Payer: OTHER GOVERNMENT

## 2023-02-27 ENCOUNTER — HOSPITAL ENCOUNTER (OUTPATIENT)
Facility: HOSPITAL | Age: 58
Setting detail: RECURRING SERIES
Discharge: HOME | End: 2023-03-02
Payer: OTHER GOVERNMENT

## 2023-02-27 PROCEDURE — 97110 THERAPEUTIC EXERCISES: CPT

## 2023-02-27 PROCEDURE — 97530 THERAPEUTIC ACTIVITIES: CPT

## 2023-02-27 PROCEDURE — 97016 VASOPNEUMATIC DEVICE THERAPY: CPT

## 2023-02-27 NOTE — PROGRESS NOTES
PHYSICAL / OCCUPATIONAL THERAPY - DAILY TREATMENT NOTE (updated )    Patient Name: Khris Colby    Date: 2023    : 1965  Insurance: Payor: Priyanka Line / Plan: Priyanka Line / Product Type: *No Product type* /      Patient  verified yes     Visit #   Current / Total 8 18   Time   In / Out 4:06 5:00   Pain   In / Out 1-2 1-2   Subjective Functional Status/Changes: Pt reports coming down stairs harder than walking up, he does swing his leg out to the side since it's tight with the bending. Pt reports left knee chinyere sometimes when he is walking, that is why he is still using the cane. Changes to:  Meds, Allergies, Med Hx, Sx Hx? If yes, update Summary List no       TREATMENT AREA = left TKA    OBJECTIVE    Modalities Rationale:     decrease edema, decrease inflammation, and decrease pain to improve patient's ability to progress to PLOF and address remaining functional goals. min [] Estim Unattended, type/location:                                                            []  w/ice    []  w/heat     min [] Estim Attended, type/location:                                                           []  w/US     []  w/ice    []  w/heat    []  TENS insruct       min []  Mechanical Traction: type/lbs                    []  pro   []  sup   []  int   []  cont    []  before manual    []  after manual     min []  Ultrasound, settings/location:        min []  Iontophoresis w/ dexamethasone, location:                                                []  take home patch       []  in clinic     min  unbill []  Ice     []  Heat    location/position:       min []  Paraffin,  details:     10 min [x]  Vasopneumatic Device, press/temp: Medium pressure, coldest setting, Patient supine with LE wedge     min []  Kelvin Wallace / Roseann Lyme:      If using vaso (only need to measure limb vaso being performed on)      pre-treatment girth : 50.8 cm       post-treatment girth : 49.7cm      measured at (landmark location) :  mid patella      min []  Other:     Skin assessment post-treatment (if applicable):    [x]  intact    []  redness- no adverse reaction                 []redness - adverse reaction:        Therapeutic Procedures: Tx Min Billable or 1:1 Min (if diff from Tx Min) Procedure, Rationale, Specifics   24  20568 Therapeutic Exercise (timed):  increase ROM, strength, coordination, balance, and proprioception to improve patient's ability to progress to PLOF and address remaining functional goals. (see flow sheet as applicable)     Details if applicable:       20  11661 Therapeutic Activity (timed):  use of dynamic activities replicating functional movements to increase ROM, strength, coordination, balance, and proprioception in order to improve patient's ability to progress to PLOF and address remaining functional goals. (see flow sheet as applicable)     Details if applicable:  gait activity, step ups, mini squats    40  [facility] Totals Reminder: bill using total billable min of TIMED therapeutic procedures (example: do not include dry needle or estim unattended, both untimed codes, in totals to left)  8-22 min = 1 unit; 23-37 min = 2 units; 38-52 min = 3 units; 53-67 min = 4 units; 68-82 min = 5 units   Total Total     [x]  Patient Education billed concurrently with other procedures   [x] Review HEP    [] Progressed/Changed HEP, detail:    [] Other detail:       Objective Information/Functional Measures/Assessment    Reviewed standing hip 3 way for HEP, cued pt for upright posture and glute activation on stance leg. Added mini squats, Mod Vcing to avoid shifting weight to right LE. Added high knee, retro and side stepping gait, pt with good balance. Added step ups, pt demo's increased UE use with 6\" block, no compensation noted with step ups on 3\" step.      Patient will continue to benefit from skilled PT services to modify and progress therapeutic interventions, analyze and address functional mobility deficits, analyze and address ROM deficits, analyze and address strength deficits, analyze and address soft tissue restrictions, analyze and cue for proper movement patterns, and instruct in home and community integration to address functional deficits and attain remaining goals. Progress toward goals / Updated goals:  []  See Progress Note/Recertification    Short Term Goals: To be accomplished in  3  weeks:  1. Pt will be compliant with HEP for symptom management at home. 2. Pt will demonstrate left knee extension AROM to at least 9 deg from neutral to improve stance phase. 3. Pt will demonstrate left knee flexion AROM to at least 95 to improve gait training. 4. Pt will increase FOTO Functional Status score to 57 to improve rehabilitative outcomes. Added step ups for stair negotiation   Long Term Goals: To be accomplished in  6  weeks:  1. Pt will be independent with HEP at D/C for self management. 2. Pt will demonstrate left knee flexion AROM to at least 105 to improve stair negotiation. 3. Pt will ambulate 1 mile to improve quality of life. 4. Pt will increase FOTO Functional Status score to 64 to decrease functional limitations. 5. Pt will demonstrate left knee extension strength of at least 5/5 to engage in age appropriate activities.     PLAN  yes Continue plan of care  [x]  Upgrade activities as tolerated  []  Discharge due to :  []  Other:    Zain Gibson PTA    2/27/2023    6:11 PM    Future Appointments   Date Time Provider Gucci Lawrence   2/28/2023  4:00 PM [facility]   3/2/2023  3:00 PM Taryn Llody PT [facility]   3/6/2023  4:00 PM Taryn Lloyd PT [facility]   3/7/2023  3:00 PM JESSICA Salas [facility]   3/9/2023  3:00 PM JESSICA Salas [facility]   3/13/2023  4:00 PM Taryn Lloyd PT [facility]   3/15/2023  4:30 PM EJSSICA Salas [facility]   3/16/2023  3:00 PM JESSICA Salas [facility]   3/20/2023  4:00 PM Cecilia Marshall, [facility]   3/22/2023  4:30 PM Ephriam Generous, PT [facility]   3/23/2023  3:00 PM Ephriam Generous, PT [facility]

## 2023-02-28 ENCOUNTER — APPOINTMENT (OUTPATIENT)
Facility: HOSPITAL | Age: 58
End: 2023-02-28
Payer: OTHER GOVERNMENT

## 2023-03-02 ENCOUNTER — HOSPITAL ENCOUNTER (OUTPATIENT)
Facility: HOSPITAL | Age: 58
Setting detail: RECURRING SERIES
Discharge: HOME | End: 2023-03-05
Payer: OTHER GOVERNMENT

## 2023-03-02 PROCEDURE — 97530 THERAPEUTIC ACTIVITIES: CPT

## 2023-03-02 PROCEDURE — 97110 THERAPEUTIC EXERCISES: CPT

## 2023-03-02 PROCEDURE — 97016 VASOPNEUMATIC DEVICE THERAPY: CPT

## 2023-03-02 PROCEDURE — 97112 NEUROMUSCULAR REEDUCATION: CPT

## 2023-03-02 NOTE — PROGRESS NOTES
PHYSICAL / OCCUPATIONAL THERAPY - DAILY TREATMENT NOTE (updated )    Patient Name: Mervyn Schaumann    Date: 3/2/2023    : 1965  Insurance: Payor: Surinder Loera / Plan: Surinder Loera / Product Type: *No Product type* /      Patient  verified YES    Visit #   Current / Total 9 18   Time   In / Out 255 345   Pain   In / Out 2 2   Subjective Functional Status/Changes: Pt concerned regarding knee regressing. Changes to:  Meds, Allergies, Med Hx, Sx Hx? If yes, update Summary List no       TREATMENT AREA =  Left TKA    OBJECTIVE    Modalities Rationale:     decrease edema, decrease inflammation, and decrease pain to improve patient's ability to progress to PLOF and address remaining functional goals. 10 min [x]  Vasopneumatic Device, press/temp: Medium pressure, coldest setting, Patient supine with LE wedge   If using vaso (only need to measure limb vaso being performed on)      pre-treatment girth : 50 cm       post-treatment girth : 49.5 cm      measured at (landmark location) :  mid patella    Skin assessment post-treatment (if applicable):    [x]  intact    []  redness- no adverse reaction                 []redness - adverse reaction:         Therapeutic Procedures:      Therapeutic Procedures: Tx Min Billable or 1:1 Min (if diff from Tx Min) Procedure, Rationale, Specifics   24  76904 Therapeutic Exercise (timed):  increase ROM, strength, coordination, balance, and proprioception to improve patient's ability to progress to PLOF and address remaining functional goals. (see flow sheet as applicable)     Details if applicable:    See flowsheet     8  25384 Therapeutic Activity (timed):  use of dynamic activities replicating functional movements to increase ROM, strength, coordination, balance, and proprioception in order to improve patient's ability to progress to PLOF and address remaining functional goals.   (see flow sheet as applicable)     Details if applicable: See flow sheet     8  78165 Neuromuscular Re-Education (timed):  improve balance, coordination, kinesthetic sense, posture, core stability and proprioception to improve patient's ability to develop conscious control of individual muscles and awareness of position of extremities in order to progress to PLOF and address remaining functional goals. (see flow sheet as applicable)     Details if applicable:    See flowsheet   36  [facility] BC Totals Reminder: bill using total billable min of TIMED therapeutic procedures (example: do not include dry needle or estim unattended, both untimed codes, in totals to left)  8-22 min = 1 unit; 23-37 min = 2 units; 38-52 min = 3 units; 53-67 min = 4 units; 68-82 min = 5 units   Total Total     [x]  Patient Education billed concurrently with other procedures   [x] Review HEP    [] Progressed/Changed HEP, detail:    [] Other detail:       Objective Information/Functional Measures/Assessment  Knee AROM: 6-90    Pt tolerated session well and demonstrated minimal apprehension during movements. Required moderate cuing throughout session. Difficulty with improving flexion AROM. Improvement with extension AROM from start of care. Patient will continue to benefit from skilled PT / OT services to modify and progress therapeutic interventions, analyze and address functional mobility deficits, analyze and address ROM deficits, analyze and address strength deficits, analyze and address soft tissue restrictions, analyze and cue for proper movement patterns, analyze and modify for postural abnormalities, and instruct in home and community integration to address functional deficits and attain remaining goals. Progress toward goals / Updated goals:  []  See Progress Note/Recertification    Short Term Goals: To be accomplished in  3  weeks:  1. Pt will be compliant with HEP for symptom management at home.     2. Pt will demonstrate left knee extension AROM to at least 9 deg from neutral to improve stance phase. MET: 6 deg from neutral AROM  3. Pt will demonstrate left knee flexion AROM to at least 95 to improve gait training. PROGRESSING  4. Pt will increase FOTO Functional Status score to 57 to improve rehabilitative outcomes.        PLAN  yes Continue plan of care  []  Upgrade activities as tolerated  []  Discharge due to :  []  Other:    Cherylene Highland, PT    3/2/2023    2:40 PM    Future Appointments   Date Time Provider Gucci Lawrence   3/2/2023  3:00 PM Cherylene Highland, PT [facility] [address]   3/6/2023  4:00 PM Cherylene Montrose, PT [facility] [address]   3/7/2023  3:00 PM Cherylene Montrose, PT [facility] [address]   3/9/2023  3:00 PM Cherylene Montrose, PT [facility] [address]   3/13/2023  4:00 PM Cherylene Eduardo, PT [facility] [address]   3/15/2023  4:30 PM Cherylene Montrose, PT [facility] [address]   3/16/2023  3:00 PM Cherylene Montrose, PT [facility] [address]   3/20/2023  4:00 PM Caridad Gilman, PTA [facility] [address]   3/22/2023  4:30 PM Cherylene Montrose, PT [facility] [address]   3/23/2023  3:00 PM Cherylene Eduardo, PT [facility] [address]

## 2023-03-06 ENCOUNTER — HOSPITAL ENCOUNTER (OUTPATIENT)
Facility: HOSPITAL | Age: 58
Setting detail: RECURRING SERIES
Discharge: HOME | End: 2023-03-09
Payer: OTHER GOVERNMENT

## 2023-03-06 PROCEDURE — 97016 VASOPNEUMATIC DEVICE THERAPY: CPT

## 2023-03-06 PROCEDURE — 97112 NEUROMUSCULAR REEDUCATION: CPT

## 2023-03-06 PROCEDURE — 97530 THERAPEUTIC ACTIVITIES: CPT

## 2023-03-06 PROCEDURE — 97110 THERAPEUTIC EXERCISES: CPT

## 2023-03-06 NOTE — PROGRESS NOTES
[facility] [place] [address]   Phone: [phone] Fax: [phone]  PROGRESS NOTE  Patient Name: Gerardo Cadena : 1965   Treatment/Medical Diagnosis: Left TKA   Referral Source: Sam Lopez PA-C     Date of Initial Visit: 2/10/2023 Attended Visits: 9 Missed Visits: 0     SUMMARY OF TREATMENT  Patient's POC has consisted of therex, therapeutic activities, manual therapy prn, modalities prn, pt. education, and a comprehensive HEP. Treatment strategies used to address functional mobility deficits, ROM deficits, strength deficits, analyze and address soft tissue restrictions, analyze and cue movement patterns, analyze and modify body mechanics/ergonomics, assess and modify postural abnormalities and instruct in home and community integration. CURRENT STATUS  Zuleika Spaulding continues to progress towards goals in PT by increasing strength and improving functional mobility. Pt reports daily compliance with HEP and reports 40% overall improvement since [facility]. Pt's max p! = 8/10 and min p! 1/10. Less difficulty with shower/toilet transfers, negotiating stairs and donning/doffing clothing. Continued difficulty with prolonged walking. Left Knee AROM was measured at 4 - 90 degrees with pain at end range. Gait: ambulates with SPC                                                   AROM  Strength     Hip  Flexion (0-120)      4+     Knee  Flexion (0-135) 90  5                 Extension (0) 4  4+    Notes: minimal extensor lag c SLR      Goal/Measure of Progress Goal Met? 1. Pt will be compliant with HEP for symptom management at home. Status at last Eval: NA Current Status: Compliant MET   2. Pt will demonstrate left knee extension AROM to at least 9 deg from neutral to improve stance phase. Status at last Eval: 17 Current Status: 4 MET   3. Pt will demonstrate left knee flexion AROM to at least 95 to improve gait training. Status at last Eval: 80 Current Status: 90 NOT MET     New Goals to be achieved in __4-6__weeks     1. Pt will be independent with HEP at D/C for self management. 2. Pt will demonstrate left knee flexion AROM to at least 105 to improve stair negotiation. 3. Pt will ambulate 1 mile to improve quality of life. 4. Pt will increase FOTO Functional Status score to 64 to decrease functional limitations. 5. Pt will demonstrate left knee extension strength of at least 5/5 to engage in age appropriate activities. RECOMMENDATIONS  Continue with PT  If you have any questions/comments please contact us directly at [phone]   Thank you for allowing us to assist in the care of your patient. Therapist Signature: Penny Fulton, KIMBERLY, OCS, Cert DN.   Date: 3/6/2023   Reporting Period  NA Time: 3:51 PM 39w

## 2023-03-06 NOTE — PROGRESS NOTES
PHYSICAL / OCCUPATIONAL THERAPY - DAILY TREATMENT NOTE (updated )    Patient Name: Aquiles Jimenez    Date: 3/6/2023    : 1965  Insurance: Payor: Sugey Diss / Plan: Sugey Diss / Product Type: *No Product type* /      Patient  verified YES    Visit #   Current / Total 10 18   Time   In / Out 344 450   Pain   In / Out 2 2   Subjective Functional Status/Changes: See PN   Changes to:  Meds, Allergies, Med Hx, Sx Hx? If yes, update Summary List no       TREATMENT AREA =      OBJECTIVE    Modalities Rationale:     decrease edema, decrease inflammation, and decrease pain to improve patient's ability to progress to PLOF and address remaining functional goals. 10 min [x]  Vasopneumatic Device, press/temp: Medium pressure, coldest setting, Patient supine with LE wedge   If using vaso (only need to measure limb vaso being performed on)      pre-treatment girth : 48 cm       post-treatment girth : 47 cm      measured at (landmark location) :  mid patella    Skin assessment post-treatment (if applicable):    [x]  intact    []  redness- no adverse reaction                 []redness - adverse reaction:             Therapeutic Procedures: Tx Min Billable or 1:1 Min (if diff from Tx Min) Procedure, Rationale, Specifics   36 06 [id number] Therapeutic Exercise (timed):  increase ROM, strength, coordination, balance, and proprioception to improve patient's ability to progress to PLOF and address remaining functional goals. (see flow sheet as applicable)     Details if applicable:    See flowsheet     10 87 60136 Therapeutic Activity (timed):  use of dynamic activities replicating functional movements to increase ROM, strength, coordination, balance, and proprioception in order to improve patient's ability to progress to PLOF and address remaining functional goals.   (see flow sheet as applicable)     Details if applicable:    See flow sheet     10 46 75765 Neuromuscular Re-Education (timed):  improve balance, coordination, kinesthetic sense, posture, core stability and proprioception to improve patient's ability to develop conscious control of individual muscles and awareness of position of extremities in order to progress to PLOF and address remaining functional goals. (see flow sheet as applicable)     Details if applicable:    See flowsheet   56 64 [facility] Totals Reminder: bill using total billable min of TIMED therapeutic procedures (example: do not include dry needle or estim unattended, both untimed codes, in totals to left)  8-22 min = 1 unit; 23-37 min = 2 units; 38-52 min = 3 units; 53-67 min = 4 units; 68-82 min = 5 units   Total Total     [x]  Patient Education billed concurrently with other procedures   [x] Review HEP    [] Progressed/Changed HEP, detail:    [] Other detail:       Objective Information/Functional Measures/Assessment    Pt tolerated session well and demonstrated no apprehension during movements. Required min/mod cuing throughout session. Difficulty with flexion and extension. Patient will continue to benefit from skilled PT / OT services to modify and progress therapeutic interventions, analyze and address functional mobility deficits, analyze and address ROM deficits, analyze and address strength deficits, analyze and address soft tissue restrictions, analyze and cue for proper movement patterns, analyze and modify for postural abnormalities, and instruct in home and community integration to address functional deficits and attain remaining goals.   Progress toward goals / Updated goals:  []  See Progress Note/Recertification    See PN    PLAN  yes Continue plan of care  []  Upgrade activities as tolerated  []  Discharge due to :  []  Other:    Susan Solano PT    3/6/2023    4:27 PM    Future Appointments   Date Time Provider Gucci Lawrence   3/7/2023  3:00 PM Susan Solano PT [facility]   3/9/2023  3:00 PM Susan Solano PT [facility]   3/13/2023 4:00 PM Camilo Blanco, PT [facility]   3/15/2023  4:30 PM Camilo Blanco, PT [facility]   3/16/2023  3:00 PM Camilo Blanco, PT [facility]   3/20/2023  4:00 PM Vane Reed, PTA [facility]   3/22/2023  4:30 PM Camilo Blanco, PT [facility]   3/23/2023  3:00 PM Camilo Blanco, PT [facility]

## 2023-03-07 ENCOUNTER — HOSPITAL ENCOUNTER (OUTPATIENT)
Facility: HOSPITAL | Age: 58
Setting detail: RECURRING SERIES
Discharge: HOME | End: 2023-03-10
Payer: OTHER GOVERNMENT

## 2023-03-07 PROCEDURE — 97530 THERAPEUTIC ACTIVITIES: CPT

## 2023-03-07 PROCEDURE — 97112 NEUROMUSCULAR REEDUCATION: CPT

## 2023-03-07 PROCEDURE — 97110 THERAPEUTIC EXERCISES: CPT

## 2023-03-07 PROCEDURE — 97016 VASOPNEUMATIC DEVICE THERAPY: CPT

## 2023-03-07 NOTE — PROGRESS NOTES
PHYSICAL / OCCUPATIONAL THERAPY - DAILY TREATMENT NOTE (updated )    Patient Name: Lurdes Klein    Date: 3/7/2023    : 1965  Insurance: Payor: Rosario Cantu / Plan: Rosario Cantu / Product Type: *No Product type* /      Patient  verified yes     Visit #   Current / Total 11 18   Time   In / Out 3:37 4:28   Pain   In / Out 2 1-2   Subjective Functional Status/Changes: Pt reports 2/10 pain. Changes to:  Meds, Allergies, Med Hx, Sx Hx? If yes, update Summary List no       TREATMENT AREA =  Left knee pain s/p TKA    OBJECTIVE    Modalities Rationale:     decrease edema, decrease inflammation, and decrease pain to improve patient's ability to progress to PLOF and address remaining functional goals. min [] Estim Unattended, type/location:                                      []  w/ice    []  w/heat    min [] Estim Attended, type/location:                                     []  w/US     []  w/ice    []  w/heat    []  TENS insruct      min []  Mechanical Traction: type/lbs                   []  pro   []  sup   []  int   []  cont    []  before manual    []  after manual    min []  Ultrasound, settings/location:      min []  Iontophoresis w/ dexamethasone, location:                                               []  take home patch       []  in clinic    min  unbill []  Ice     []  Heat    location/position:     min []  Paraffin,  details:    15 min [x]  Vasopneumatic Device, press/temp: Low pressure, 34 deg, supine with wedge under B LE    min []  Alan Infante / Yolanda Portillo: If using vaso (only need to measure limb vaso being performed on)      pre-treatment girth : 47.5 cm      post-treatment girth : 47 cm      measured at (landmark location) : mid-patella     min []  Other:    Skin assessment post-treatment (if applicable):    []  intact    []  redness- no adverse reaction                 []redness - adverse reaction:        Therapeutic Procedures:   Tx Min Billable or 1:1 Min (if diff from [employer]) Procedure, Rationale, Specifics   10  93531 Therapeutic Exercise (timed):  increase ROM, strength, coordination, balance, and proprioception to improve patient's ability to progress to PLOF and address remaining functional goals. (see flow sheet as applicable)     Details if applicable:  Heel slides, incline board, prone knee hang     18  11851 Neuromuscular Re-Education (timed):  improve balance, coordination, kinesthetic sense, posture, core stability and proprioception to improve patient's ability to develop conscious control of individual muscles and awareness of position of extremities in order to progress to PLOF and address remaining functional goals. (see flow sheet as applicable)     Details if applicable:  TKE, LAQ, HS curl, QS   8  62333 Therapeutic Activity (timed):  use of dynamic activities replicating functional movements to increase ROM, strength, coordination, balance, and proprioception in order to improve patient's ability to progress to PLOF and address remaining functional goals.   (see flow sheet as applicable)     Details if applicable: Licha Haddad              39  [facility] BC Totals Reminder: bill using total billable min of TIMED therapeutic procedures (example: do not include dry needle or estim unattended, both untimed codes, in totals to left)  8-22 min = 1 unit; 23-37 min = 2 units; 38-52 min = 3 units; 53-67 min = 4 units; 68-82 min = 5 units   Total Total     [x]  Patient Education billed concurrently with other procedures   [x] Review HEP    [] Progressed/Changed HEP, detail:    [] Other detail:       Objective Information/Functional Measures/Assessment    Exercises per flow sheet    Provided verbal cues for 2 second hold at end-range with rocking on bike to provide gentle stretch    Patient will continue to benefit from skilled PT / OT services to modify and progress therapeutic interventions, analyze and address functional mobility deficits, analyze and address ROM deficits, analyze and address strength deficits, analyze and address soft tissue restrictions, analyze and cue for proper movement patterns, analyze and modify for postural abnormalities, analyze and address imbalance/dizziness, and instruct in home and community integration to address functional deficits and attain remaining goals. Progress toward goals / Updated goals:  []  See Progress Note/Recertification    Progressing toward goals:   1. Pt will be independent with HEP at D/C for self management. 2. Pt will demonstrate left knee flexion AROM to at least 105 to improve stair negotiation. 3. Pt will ambulate 1 mile to improve quality of life. 4. Pt will increase FOTO Functional Status score to 64 to decrease functional limitations. 5. Pt will demonstrate left knee extension strength of at least 5/5 to engage in age appropriate activities.     PLAN  yes Continue plan of care  []  Upgrade activities as tolerated  []  Discharge due to :  []  Other:    Kenroy Calixto, PT    3/7/2023    3:40 PM    Future Appointments   Date Time Provider Gucci Lawrence   3/9/2023  3:00 PM Lucia Bhardwaj PT [facility]   3/13/2023  4:00 PM Lucia Bhardwaj PT [facility]   3/15/2023  4:30 PM Lucia Bhardwaj PT [facility]   3/16/2023  3:00 PM Lucia Bhardwaj PT [facility]   3/20/2023  4:00 PM Bonnie Gage, MAOSN [facility]   3/22/2023  4:30 PM Lucia Bhardwaj PT [facility]   3/23/2023  3:00 PM Lucia Bhardwaj PT [facility]

## 2023-03-07 NOTE — PROGRESS NOTES
PHYSICAL / OCCUPATIONAL THERAPY - DAILY TREATMENT NOTE (updated )    Patient Name: Clemente Ely    Date: 3/7/2023    : 1965  Insurance: Payor: Megan Heart / Plan: Megan Heart / Product Type: *No Product type* /      Patient  verified YES    Visit #   Current / Total 11 18   Time   In / Out *** ***   Pain   In / Out *** ***   Subjective Functional Status/Changes: ***   Changes to:  Meds, Allergies, Med Hx, Sx Hx? If yes, update Summary List {YES/NO DIET:[id number]}       TREATMENT AREA = Left TKA    OBJECTIVE    Modalities Rationale:     decrease edema, decrease inflammation, and decrease pain to improve patient's ability to progress to PLOF and address remaining functional goals. 10 min [x]  Vasopneumatic Device, press/temp: Medium pressure, coldest setting, Patient supine with LE wedge   If using vaso (only need to measure limb vaso being performed on)      pre-treatment girth : 50 cm       post-treatment girth : 49.5 cm      measured at (landmark location) :  mid patella    Skin assessment post-treatment (if applicable):    [x]  intact    []  redness- no adverse reaction                 []redness - adverse reaction:        Therapeutic Procedures: Tx Min Billable or 1:1 Min (if diff from Tx Min) Procedure, Rationale, Specifics     33733 Therapeutic Exercise (timed):  increase ROM, strength, coordination, balance, and proprioception to improve patient's ability to progress to PLOF and address remaining functional goals. (see flow sheet as applicable)     Details if applicable:    See flowsheet       22335 Therapeutic Activity (timed):  use of dynamic activities replicating functional movements to increase ROM, strength, coordination, balance, and proprioception in order to improve patient's ability to progress to PLOF and address remaining functional goals.   (see flow sheet as applicable)     Details if applicable:    See flow sheet       36100 Neuromuscular Re-Education (timed):  improve balance, coordination, kinesthetic sense, posture, core stability and proprioception to improve patient's ability to develop conscious control of individual muscles and awareness of position of extremities in order to progress to PLOF and address remaining functional goals. (see flow sheet as applicable)     Details if applicable:    See flowsheet     28966 Gait Training (timed):    *** feet with *** (assistive device) over *** surfaces with *** level of assist. Cuing for ***. To improve safety and dynamic movement with household/community ambulation. (see flow sheet as applicable)     Details if applicable:       {[employer] Procedures:03742}     Details if applicable:       [facility] BC Totals Reminder: bill using total billable min of TIMED therapeutic procedures (example: do not include dry needle or estim unattended, both untimed codes, in totals to left)  8-22 min = 1 unit; 23-37 min = 2 units; 38-52 min = 3 units; 53-67 min = 4 units; 68-82 min = 5 units   Total Total     [x]  Patient Education billed concurrently with other procedures   [x] Review HEP    [] Progressed/Changed HEP, detail:    [] Other detail:       Objective Information/Functional Measures/Assessment    Pt tolerated session *** and demonstrated *** apprehension during movements. Required *** cuing throughout session. Difficulty with ***. Improvement with ***    Patient will continue to benefit from skilled PT / OT services to modify and progress therapeutic interventions, analyze and address functional mobility deficits, analyze and address ROM deficits, analyze and address strength deficits, analyze and address soft tissue restrictions, analyze and cue for proper movement patterns, analyze and modify for postural abnormalities, and instruct in home and community integration to address functional deficits and attain remaining goals.     Progress toward goals / Updated goals:  []  See Progress Note/Recertification    ***    PLAN  yes Continue plan of care  []  Upgrade activities as tolerated  []  Discharge due to :  []  Other:    Terry Carreon, PT    3/7/2023    2:38 PM    Future Appointments   Date Time Provider Gucci Lawrence   3/7/2023  3:00 PM Terry Carreon, PT [facility]   3/9/2023  3:00 PM Terry Carreon, PT [facility]   3/13/2023  4:00 PM Terry Carreon, PT [facility]   3/15/2023  4:30 PM Terry Carreon, PT [facility]   3/16/2023  3:00 PM Terry Carreon, PT [facility]   3/20/2023  4:00 PM Divine Wilcox, PTA [facility]   3/22/2023  4:30 PM Terry Carreon, PT [facility]   3/23/2023  3:00 PM Terry Carreon, PT [facility]

## 2023-03-08 RX ORDER — ASPIRIN 81 MG/1
TABLET, COATED ORAL
Qty: 60 TABLET | OUTPATIENT
Start: 2023-03-08

## 2023-03-09 ENCOUNTER — HOSPITAL ENCOUNTER (OUTPATIENT)
Facility: HOSPITAL | Age: 58
Setting detail: RECURRING SERIES
Discharge: HOME | End: 2023-03-12
Payer: OTHER GOVERNMENT

## 2023-03-09 PROCEDURE — 97110 THERAPEUTIC EXERCISES: CPT

## 2023-03-09 PROCEDURE — 97140 MANUAL THERAPY 1/> REGIONS: CPT

## 2023-03-09 NOTE — PROGRESS NOTES
PHYSICAL / OCCUPATIONAL THERAPY - DAILY TREATMENT NOTE (updated )    Patient Name: Michel Kowalski    Date: 3/9/2023    : 1965  Insurance: Payor: Natalie Query / Plan: Natalie Query / Product Type: *No Product type* /      Patient  verified YES    Visit #   Current / Total 12 18   Time   In / Out 238 336   Pain   In / Out 4 4   Subjective Functional Status/Changes: Pt report compliance with HEP but has only been holding prone knee hang for 2 minutes. Changes to:  Meds, Allergies, Med Hx, Sx Hx? If yes, update Summary List no       TREATMENT AREA =  Left Knee Pain    OBJECTIVE      Therapeutic Procedures: Tx Min Billable or 1:1 Min (if diff from Tx Min) Procedure, Rationale, Specifics   50  36491 Therapeutic Exercise (timed):  increase ROM, strength, coordination, balance, and proprioception to improve patient's ability to progress to PLOF and address remaining functional goals. (see flow sheet as applicable)     Details if applicable:    See flowsheet     8  50338 Manual Therapy (timed):  decrease pain, increase ROM, and increase tissue extensibility to improve patient's ability to progress to PLOF and address remaining functional goals. The manual therapy interventions were performed at a separate and distinct time from the therapeutic activities interventions . (see flow sheet as applicable)     Details if applicable:     In seated position  Tibial distraction c flexion mobilization   62  [facility] BC Totals Reminder: bill using total billable min of TIMED therapeutic procedures (example: do not include dry needle or estim unattended, both untimed codes, in totals to left)  8-22 min = 1 unit; 23-37 min = 2 units; 38-52 min = 3 units; 53-67 min = 4 units; 68-82 min = 5 units   Total Total     [x]  Patient Education billed concurrently with other procedures   [x] Review HEP    [] Progressed/Changed HEP, detail:    [] Other detail:       Objective Information/Functional Measures/Assessment  92 deg AROM   Educated pt on the importance perform low load extension stretch for 15 minutes. Pt tolerated session well and demonstrated moderate apprehension during movements. Required min cuing throughout session. Difficulty with ROM exercises. Improvement with flexion ROM    Patient will continue to benefit from skilled PT / OT services to modify and progress therapeutic interventions, analyze and address functional mobility deficits, analyze and address ROM deficits, analyze and address strength deficits, analyze and address soft tissue restrictions, analyze and cue for proper movement patterns, analyze and modify for postural abnormalities, and instruct in home and community integration to address functional deficits and attain remaining goals.     Progress toward goals / Updated goals:  []  See Progress Note/Recertification    Flexion improved slightly following manual therapy    PLAN  yes Continue plan of care  []  Upgrade activities as tolerated  []  Discharge due to :  []  Other:    Louise Bashir PT    3/9/2023    2:50 PM    Future Appointments   Date Time Provider Gucci Lawrence   3/9/2023  3:00 PM Louise Bashir PT [facility]   3/13/2023  4:00 PM Louise Bashir PT [facility]   3/15/2023  4:30 PM Louise Bashir PT [facility]   3/16/2023  3:00 PM Luoise Bashir PT [facility]   3/20/2023  4:00 PM Akash Guerrero PTA [facility]   3/22/2023  4:30 PM Louise Bashir PT [facility]   3/23/2023  3:00 PM Louise Bashir PT [facility]

## 2023-03-13 ENCOUNTER — HOSPITAL ENCOUNTER (OUTPATIENT)
Facility: HOSPITAL | Age: 58
Setting detail: RECURRING SERIES
Discharge: HOME | End: 2023-03-16
Payer: OTHER GOVERNMENT

## 2023-03-13 PROCEDURE — 97016 VASOPNEUMATIC DEVICE THERAPY: CPT

## 2023-03-13 PROCEDURE — 97110 THERAPEUTIC EXERCISES: CPT

## 2023-03-13 PROCEDURE — 97140 MANUAL THERAPY 1/> REGIONS: CPT

## 2023-03-13 NOTE — PROGRESS NOTES
PHYSICAL / OCCUPATIONAL THERAPY - DAILY TREATMENT NOTE (updated )    Patient Name: Shi Taylor    Date: 3/13/2023    : 1965  Insurance: Payor: Neo Wilsons / Plan: Neo Hai / Product Type: *No Product type* /      Patient  verified YES    Visit #   Current / Total 13 18   Time   In / Out 340 448   Pain   In / Out 0 0   Subjective Functional Status/Changes: Pt reports being able to lie prone with knee off table for 10 minutes   Changes to:  Meds, Allergies, Med Hx, Sx Hx? If yes, update Summary List no       TREATMENT AREA =  Left Knee Pain    OBJECTIVE    Modalities Rationale:     decrease edema, decrease inflammation, and decrease pain to improve patient's ability to progress to PLOF and address remaining functional goals. min [] Estim Unattended, type/location:                                                            []  w/ice    []  w/heat     min [] Estim Attended, type/location:                                                           []  w/US     []  w/ice    []  w/heat    []  TENS insruct       min []  Mechanical Traction: type/lbs                    []  pro   []  sup   []  int   []  cont    []  before manual    []  after manual     min []  Ultrasound, settings/location:        min []  Iontophoresis w/ dexamethasone, location:                                                []  take home patch       []  in clinic     min  unbill []  Ice     []  Heat    location/position:       min []  Paraffin,  details:     10 min [x]  Vasopneumatic Device, press/temp: Low pressure, 34 deg, supine with wedge under B LE     min []  Brody Brody / Yanet Bless:      If using vaso (only need to measure limb vaso being performed on)      pre-treatment girth : 47.5 cm      post-treatment girth : 47 cm      measured at (landmark location) : mid-patella      min []  Other:     Skin assessment post-treatment (if applicable):    []  intact    []  redness- no adverse reaction []redness - adverse reaction:         Therapeutic Procedures:    Therapeutic Procedures: Tx Min Billable or 1:1 Min (if diff from Tx Min) Procedure, Rationale, Specifics   42  07811 Therapeutic Exercise (timed):  increase ROM, strength, coordination, balance, and proprioception to improve patient's ability to progress to PLOF and address remaining functional goals. (see flow sheet as applicable)     Details if applicable:    See flowsheet     16  97983 Manual Therapy (timed):  decrease pain, increase ROM, and increase tissue extensibility to improve patient's ability to progress to PLOF and address remaining functional goals. The manual therapy interventions were performed at a separate and distinct time from the therapeutic activities interventions . (see flow sheet as applicable)     Details if applicable: In seated position  Tibial distraction c flexion mobilization   62  [facility] BC Totals Reminder: bill using total billable min of TIMED therapeutic procedures (example: do not include dry needle or estim unattended, both untimed codes, in totals to left)  8-22 min = 1 unit; 23-37 min = 2 units; 38-52 min = 3 units; 53-67 min = 4 units; 68-82 min = 5 units   Total Total     [x]  Patient Education billed concurrently with other procedures   [x] Review HEP    [] Progressed/Changed HEP, detail:    [] Other detail:       Objective Information/Functional Measures/Assessment    Pt tolerated session well and demonstrated min apprehension during movements. Required min/mod cuing throughout session. Difficulty with improving ROM.  Improvement with flexion    Patient will continue to benefit from skilled PT / OT services to modify and progress therapeutic interventions, analyze and address functional mobility deficits, analyze and address ROM deficits, analyze and address strength deficits, analyze and address soft tissue restrictions, analyze and cue for proper movement patterns, analyze and modify for postural abnormalities, and instruct in home and community integration to address functional deficits and attain remaining goals.     Progress toward goals / Updated goals:  []  See Progress Note/Recertification    ROM 6-92 (95 after manual)    PLAN  yes Continue plan of care  []  Upgrade activities as tolerated  []  Discharge due to :  []  Other:    Jp Gil PT    3/13/2023    3:57 PM    Future Appointments   Date Time Provider Gucci Lawrence   3/13/2023  4:00 PM Jp Gil PT [facility]   3/15/2023  4:30 PM Jp Gil PT [facility]   3/16/2023  3:00 PM Jp Gil PT [facility]   3/20/2023  4:00 PM Prema Chowdhury PTA [facility]   3/22/2023  4:30 PM pJ Gil PT [facility]   3/23/2023  3:00 PM JESSICA Reynolds [facility]

## 2023-03-15 ENCOUNTER — APPOINTMENT (OUTPATIENT)
Facility: HOSPITAL | Age: 58
End: 2023-03-15
Payer: OTHER GOVERNMENT

## 2023-03-20 ENCOUNTER — HOSPITAL ENCOUNTER (OUTPATIENT)
Facility: HOSPITAL | Age: 58
Setting detail: RECURRING SERIES
Discharge: HOME | End: 2023-03-23
Payer: OTHER GOVERNMENT

## 2023-03-20 PROCEDURE — 97530 THERAPEUTIC ACTIVITIES: CPT

## 2023-03-20 PROCEDURE — 97140 MANUAL THERAPY 1/> REGIONS: CPT

## 2023-03-20 PROCEDURE — 97016 VASOPNEUMATIC DEVICE THERAPY: CPT

## 2023-03-20 NOTE — PROGRESS NOTES
stair negotiation. addressed c stretches   3. Pt will ambulate 1 mile to improve quality of life. 4. Pt will increase FOTO Functional Status score to 64 to decrease functional limitations. 5. Pt will demonstrate left knee extension strength of at least 5/5 to engage in age.  instructed to perform LAQs every 1-2 hr x 10 to promote strengthening      PLAN  yes Continue plan of care  [x]  Upgrade activities as tolerated  []  Discharge due to :  []  Other:    Gamaliel Yancey, MASON    3/20/2023    4:08 PM    Future Appointments   Date Time Provider Gucci Lawrence   3/22/2023  4:30 PM Nakia Rodrigues, PT [facility]   3/23/2023  3:00 PM Nakia Rodrigues, PT [facility]   3/27/2023  2:30 PM Gamaliel Yancey, PTA [facility]   3/29/2023  1:30 PM Gamaliel Yancey, PTA [facility]   3/30/2023  2:00 PM Anita Henline, PTA [facility]   4/3/2023  1:30 PM Anita Henline, PTA [facility]   4/5/2023  2:30 PM Anita Henline, PTA [facility]   4/10/2023  1:30 PM Anita Henline, PTA [facility]   4/12/2023  2:30 PM Anita Henline, PTA [facility]

## 2023-03-22 ENCOUNTER — HOSPITAL ENCOUNTER (OUTPATIENT)
Facility: HOSPITAL | Age: 58
Setting detail: RECURRING SERIES
Discharge: HOME | End: 2023-03-25
Payer: OTHER GOVERNMENT

## 2023-03-22 PROCEDURE — 97110 THERAPEUTIC EXERCISES: CPT

## 2023-03-22 PROCEDURE — 97140 MANUAL THERAPY 1/> REGIONS: CPT

## 2023-03-22 PROCEDURE — 97016 VASOPNEUMATIC DEVICE THERAPY: CPT

## 2023-03-22 NOTE — PROGRESS NOTES
PHYSICAL / OCCUPATIONAL THERAPY - DAILY TREATMENT NOTE (updated )    Patient Name: Sarah Herrera    Date: 3/22/2023    : 1965  Insurance: Payor: Sandra Santos / Plan: Sandra Santos / Product Type: *No Product type* /      Patient  verified YES    Visit #   Current / Total 15 18   Time   In / Out 419 518   Pain   In / Out 2 0   Subjective Functional Status/Changes: Pt continues to report anterior knee pain and increased swelling. Reports compliance with HEP   Changes to:  Meds, Allergies, Med Hx, Sx Hx? If yes, update Summary List no       TREATMENT AREA =  Left Knee Pain    OBJECTIVE    Modalities Rationale:     decrease edema, decrease inflammation, and decrease pain to improve patient's ability to progress to PLOF and address remaining functional goals. 10 min [x]  Vasopneumatic Device, press/temp: Low pressure, 34 deg, supine with wedge under B LE and heel prop under ankle to promote knee ext AROM     min []  Akira Solis / Clevester Lundborg: If using vaso (only need to measure limb vaso being performed on)      pre-treatment girth : 48.5 cm      post-treatment girth : 48 cm      measured at (landmark location) : mid-patella      min []  Other:     Skin assessment post-treatment (if applicable):    [x]  intact    []  redness- no adverse reaction                 []redness - adverse reaction:             Therapeutic Procedures: Tx Min Billable or 1:1 Min (if diff from Tx Min) Procedure, Rationale, Specifics   39  51954 Therapeutic Exercise (timed):  increase ROM, strength, coordination, balance, and proprioception to improve patient's ability to progress to PLOF and address remaining functional goals. (see flow sheet as applicable)     Details if applicable:    See flowsheet     10  36088 Manual Therapy (timed):  decrease pain, increase ROM, and increase tissue extensibility to improve patient's ability to progress to PLOF and address remaining functional goals.   The manual therapy

## 2023-03-23 ENCOUNTER — HOSPITAL ENCOUNTER (OUTPATIENT)
Facility: HOSPITAL | Age: 58
Setting detail: RECURRING SERIES
Discharge: HOME | End: 2023-03-26
Payer: OTHER GOVERNMENT

## 2023-03-23 PROCEDURE — 97140 MANUAL THERAPY 1/> REGIONS: CPT

## 2023-03-23 PROCEDURE — 97110 THERAPEUTIC EXERCISES: CPT

## 2023-03-23 NOTE — PROGRESS NOTES
PHYSICAL / OCCUPATIONAL THERAPY - DAILY TREATMENT NOTE (updated )    Patient Name: Jose Matamoros    Date: 3/23/2023    : 1965  Insurance: Payor: He Mary / Plan: He Mary / Product Type: *No Product type* /      Patient  verified YES    Visit #   Current / Total 16 18   Time   In / Out 244 338   Pain   In / Out 3 2   Subjective Functional Status/Changes: Pt reports liking the bike and leg press. Has not started wearing compression garment again. Immediately following yesterday's session pt was hurting and went to sleep afterwards. \"Felt like I had a workout. ..which is a good thing\"   Changes to:  Meds, Allergies, Med Hx, Sx Hx? If yes, update Summary List no       TREATMENT AREA =      OBJECTIVE    Therapeutic Procedures: Tx Min Billable or 1:1 Min (if diff from Tx Min) Procedure, Rationale, Specifics   46  21597 Therapeutic Exercise (timed):  increase ROM, strength, coordination, balance, and proprioception to improve patient's ability to progress to PLOF and address remaining functional goals. (see flow sheet as applicable)     Details if applicable:    See flowsheet     8  50789 Manual Therapy (timed):  decrease pain, increase ROM, and increase tissue extensibility to improve patient's ability to progress to PLOF and address remaining functional goals. The manual therapy interventions were performed at a separate and distinct time from the therapeutic activities interventions .  (see flow sheet as applicable)     Details if applicable: Static cupping to left hamstring and gastrocnemius during knee extension PROM, contract/relax and tibial mobilization   47  [facility] BC Totals Reminder: bill using total billable min of TIMED therapeutic procedures (example: do not include dry needle or estim unattended, both untimed codes, in totals to left)  8-22 min = 1 unit; 23-37 min = 2 units; 38-52 min = 3 units; 53-67 min = 4 units; 68-82 min = 5 units   Total Total     [x]  Patient Education

## 2023-03-27 ENCOUNTER — HOSPITAL ENCOUNTER (OUTPATIENT)
Facility: HOSPITAL | Age: 58
Setting detail: RECURRING SERIES
Discharge: HOME | End: 2023-03-30
Payer: OTHER GOVERNMENT

## 2023-03-27 PROCEDURE — 97140 MANUAL THERAPY 1/> REGIONS: CPT

## 2023-03-27 PROCEDURE — 97110 THERAPEUTIC EXERCISES: CPT

## 2023-03-27 PROCEDURE — 97530 THERAPEUTIC ACTIVITIES: CPT

## 2023-03-27 PROCEDURE — 97112 NEUROMUSCULAR REEDUCATION: CPT

## 2023-03-27 NOTE — PROGRESS NOTES
and tibial mobilization. In seated position  Tibial distraction c flexion mobilization   58  [facility] BC Totals Reminder: bill using total billable min of TIMED therapeutic procedures (example: do not include dry needle or estim unattended, both untimed codes, in totals to left)  8-22 min = 1 unit; 23-37 min = 2 units; 38-52 min = 3 units; 53-67 min = 4 units; 68-82 min = 5 units   Total Total     [x]  Patient Education billed concurrently with other procedures   [x] Review HEP    [] Progressed/Changed HEP, detail:    [] Other detail:       Objective Information/Functional Measures/Assessment    Pt extension ROM went from 15 degrees to 12 degrees from neutral within session. He was advised to obtain compression garment with 15mmhg - 20 mmhg and consult MD regarding lack of progress. Patient will continue to benefit from skilled PT / OT services to modify and progress therapeutic interventions, analyze and address functional mobility deficits, analyze and address ROM deficits, analyze and address strength deficits, analyze and address soft tissue restrictions, analyze and cue for proper movement patterns, analyze and modify for postural abnormalities, and instruct in home and community integration to address functional deficits and attain remaining goals.     Progress toward goals / Updated goals:  []  See Progress Note/Recertification    Slow progress towards goals    PLAN  yes Continue plan of care  []  Upgrade activities as tolerated  []  Discharge due to :  []  Other:    Blayne Casiano PT    3/27/2023    2:32 PM    Future Appointments   Date Time Provider Gucci Lawrence   3/29/2023  1:30 PM Blayne Casiano PT [facility]   3/30/2023  2:00 PM Candie Gaxiola PTA [facility]   4/4/2023  4:30 PM Naina Dumont PT [facility]   4/10/2023  3:00 PM Candie Gaxiola PTA [facility]   4/12/2023  4:30 PM [facility]

## 2023-03-29 ENCOUNTER — HOSPITAL ENCOUNTER (OUTPATIENT)
Facility: HOSPITAL | Age: 58
Setting detail: RECURRING SERIES
Discharge: HOME | End: 2023-04-01
Payer: OTHER GOVERNMENT

## 2023-03-29 PROCEDURE — 97140 MANUAL THERAPY 1/> REGIONS: CPT

## 2023-03-29 PROCEDURE — 97110 THERAPEUTIC EXERCISES: CPT

## 2023-03-29 PROCEDURE — 97530 THERAPEUTIC ACTIVITIES: CPT

## 2023-03-29 PROCEDURE — 97016 VASOPNEUMATIC DEVICE THERAPY: CPT

## 2023-03-29 NOTE — PROGRESS NOTES
[facility]  [address] [address]   Phone: [phone] Fax: [phone]  PROGRESS NOTE  Patient Name: Rachel Aguilar : 1965   Treatment/Medical Diagnosis: Left TKA   Referral Source: Lambert Angeles PA-C     Date of Initial Visit: 2/10/2023 Attended Visits: 18 Missed Visits: 3     SUMMARY OF TREATMENT  Patient's POC has consisted of therex, therapeutic activities, manual therapy prn, modalities prn, pt. education, and a comprehensive HEP. Treatment strategies used to address functional mobility deficits, ROM deficits, strength deficits, analyze and address soft tissue restrictions, analyze and cue movement patterns, analyze and modify body mechanics/ergonomics, assess and modify postural abnormalities and instruct in home and community integration. CURRENT STATUS  Gordon Hagan has regressed since last progress note. His flexion has decreased by 5 degrees and extension by 11 degrees. He is reporting more swelling and has to wear compression garment again. Swelling has increased about 2 cm in the past 3 weeks. DPT recommended he contact MD immediately due to regression in progress. . Pt's max p! = 8/10 and min p! 0/10. Gait: ambulates with SPC                                                   AROM PROM  Strength     Hip  Flexion (0-120)   NT NT   4+     Knee  Flexion (0-135) 84 91  5                 Extension (0) 15 15 2     Notes: minimal extensor lag c SLR       Goal/Measure of Progress Goal Met? 1. Pt will be independent with HEP at D/C for self management. Status at last Eval: Compliant with HEP Current Status: Ongoing NOT MET   2. Pt will demonstrate left knee flexion AROM to at least 105 to improve stair negotiation. Status at last Eval: 90 degrees Current Status: 84 degrees NOT MET   3. Pt will ambulate 1 mile to improve quality of life.    Status at last Eval: Unable  Current Status: Unable NOT MET
PHYSICAL / OCCUPATIONAL THERAPY - DAILY TREATMENT NOTE (updated )    Patient Name: Angeli Bah    Date: 3/29/2023    : 1965  Insurance: Payor: Steve Hogan / Plan: Steve Hogan / Product Type: *No Product type* /      Patient  verified YES    Visit #   Current / Total 18 18   Time   In / Out 125 246   Pain   In / Out 4 3   Subjective Functional Status/Changes: See PN   Changes to:  Meds, Allergies, Med Hx, Sx Hx? If yes, update Summary List no       TREATMENT AREA =  Left Knee    OBJECTIVE    Modalities Rationale:     decrease edema, decrease inflammation, and decrease pain to improve patient's ability to progress to PLOF and address remaining functional goals. min [] Estim Unattended, type/location:                                                            []  w/ice    []  w/heat     min [] Estim Attended, type/location:                                                           []  w/US     []  w/ice    []  w/heat    []  TENS insruct       min []  Mechanical Traction: type/lbs                    []  pro   []  sup   []  int   []  cont    []  before manual    []  after manual     min []  Ultrasound, settings/location:        min []  Iontophoresis w/ dexamethasone, location:                                                []  take home patch       []  in clinic     min  unbill []  Ice     []  Heat    location/position:       min []  Paraffin,  details:     10 min [x]  Vasopneumatic Device, press/temp: Low pressure, 34 deg, supine with wedge under B LE     min []  [address] / [place]:      If using vaso (only need to measure limb vaso being performed on)      pre-treatment girth : 47 cm      post-treatment girth : 47 cm      measured at (landmark location) : mid-patella      min []  Other:     Skin assessment post-treatment (if applicable):    [x]  intact    []  redness- no adverse reaction                 []redness - adverse reaction:             Therapeutic
4/17/2023  4:00 PM Levon Jordan, PT [facility]   4/19/2023  4:40 PM Alyse Menchaca, JESSICA [facility]   4/24/2023  4:00 PM Levon Jordan, PT [facility]   4/26/2023  4:40 PM [facility]

## 2023-03-30 ENCOUNTER — HOSPITAL ENCOUNTER (OUTPATIENT)
Facility: HOSPITAL | Age: 58
Setting detail: RECURRING SERIES
End: 2023-03-30
Payer: OTHER GOVERNMENT

## 2023-04-03 ENCOUNTER — APPOINTMENT (OUTPATIENT)
Facility: HOSPITAL | Age: 58
End: 2023-04-03
Payer: COMMERCIAL

## 2023-04-04 ENCOUNTER — HOSPITAL ENCOUNTER (OUTPATIENT)
Facility: HOSPITAL | Age: 58
Setting detail: RECURRING SERIES
Discharge: HOME | End: 2023-04-07
Payer: OTHER GOVERNMENT

## 2023-04-04 PROCEDURE — 97110 THERAPEUTIC EXERCISES: CPT

## 2023-04-04 PROCEDURE — 97140 MANUAL THERAPY 1/> REGIONS: CPT

## 2023-04-04 NOTE — PROGRESS NOTES
ability to progress to PLOF and address remaining functional goals. min [] Estim Unattended, type/location:                                      []  w/ice    []  w/heat    min []  Mechanical Traction: type/lbs                   []  pro   []  sup   []  int   []  cont    []  before manual    []  after manual   10 min  unbill [x]  Ice     []  Heat    location/position: Left knee  supine    min []  Vasopneumatic Device, press/temp:    If using vaso (only need to measure limb vaso being performed on)      pre-treatment girth :       post-treatment girth :       measured at (landmark location) :     Skin assessment post-treatment (if applicable):    [x]  intact    [x]  redness- no adverse reaction                 []redness - adverse reaction:        [x]  Patient Education billed concurrently with other procedures   [x] Review HEP    [] Progressed/Changed HEP  [] Other:    Objective Information/Functional Measures/Assessment    Discussed with patient regressing and being conservative with treatment until his next f/u US on 4/6/23 to determine if DVT has been cleared by his medication    Patient will continue to benefit from skilled PT services to modify and progress therapeutic interventions, analyze and address functional mobility deficits, analyze and address ROM deficits, analyze and address strength deficits, analyze and address soft tissue restrictions, analyze and cue for proper movement patterns, and instruct in home and community integration to address functional deficits and attain remaining goals. Progress toward goals / Updated goals:  []  See Progress Note/Recertification    1st session since progress note. No significant progress observed      PLAN  yes Continue plan of care  []  Upgrade activities as tolerated  []  Discharge due to :  []  Other:    Nicole Rey \"BJ\" KIMBERLY Walker, Cert. MDT, Cert. DN, Cert. SMT, Dip.  [employer]    4/4/2023    4:23 PM    Future Appointments   Date Time Provider Gucci Lawrence

## 2023-04-05 ENCOUNTER — APPOINTMENT (OUTPATIENT)
Facility: HOSPITAL | Age: 58
End: 2023-04-05
Payer: COMMERCIAL

## 2023-04-10 ENCOUNTER — APPOINTMENT (OUTPATIENT)
Facility: HOSPITAL | Age: 58
End: 2023-04-10
Payer: COMMERCIAL

## 2023-04-12 ENCOUNTER — APPOINTMENT (OUTPATIENT)
Facility: HOSPITAL | Age: 58
End: 2023-04-12
Payer: COMMERCIAL

## 2023-04-13 ENCOUNTER — HOSPITAL ENCOUNTER (OUTPATIENT)
Facility: HOSPITAL | Age: 58
Setting detail: RECURRING SERIES
Discharge: HOME | End: 2023-04-16
Payer: OTHER GOVERNMENT

## 2023-04-13 PROCEDURE — 97140 MANUAL THERAPY 1/> REGIONS: CPT

## 2023-04-13 PROCEDURE — 97110 THERAPEUTIC EXERCISES: CPT

## 2023-04-13 PROCEDURE — 97530 THERAPEUTIC ACTIVITIES: CPT

## 2023-04-17 ENCOUNTER — HOSPITAL ENCOUNTER (OUTPATIENT)
Facility: HOSPITAL | Age: 58
Setting detail: RECURRING SERIES
Discharge: HOME | End: 2023-04-20
Payer: OTHER GOVERNMENT

## 2023-04-17 PROCEDURE — 97535 SELF CARE MNGMENT TRAINING: CPT

## 2023-04-17 PROCEDURE — 97112 NEUROMUSCULAR REEDUCATION: CPT

## 2023-04-17 PROCEDURE — 97116 GAIT TRAINING THERAPY: CPT

## 2023-04-17 PROCEDURE — 97110 THERAPEUTIC EXERCISES: CPT

## 2023-04-17 PROCEDURE — 97016 VASOPNEUMATIC DEVICE THERAPY: CPT

## 2023-04-17 NOTE — PROGRESS NOTES
min = 4 units; 68-82 min = 5 units   Total Total     [x]  Patient Education billed concurrently with other procedures   [x] Review HEP    [] Progressed/Changed HEP, detail:    [] Other detail:       Objective Information/Functional Measures/Assessment    Left knee AROM extension 9 from neutral and 6 degree from neutral after cupping. Pt tolerated session fairly and was able to improve knee extension by 3 degrees within session. Patient will continue to benefit from skilled PT / OT services to modify and progress therapeutic interventions, analyze and address functional mobility deficits, analyze and address ROM deficits, analyze and address strength deficits, analyze and address soft tissue restrictions, analyze and cue for proper movement patterns, analyze and modify for postural abnormalities, and instruct in home and community integration to address functional deficits and attain remaining goals. Progress toward goals / Updated goals:  []  See Progress Note/Recertification    Slow progress towards goals.     PLAN  yes Continue plan of care  []  Upgrade activities as tolerated  []  Discharge due to :  []  Other:    Alber Martini PT    4/17/2023    4:01 PM    Future Appointments   Date Time Provider Gucci Lawrence   4/19/2023  4:40 PM Rony Freitas PT [facility]   4/24/2023  4:00 PM Alber Martini PT [facility]   4/26/2023  4:40 PM Alber Martini PT [facility]   4/28/2023  1:50 PM Lashay Waller PA-C [facility]

## 2023-04-18 ENCOUNTER — APPOINTMENT (OUTPATIENT)
Facility: HOSPITAL | Age: 58
End: 2023-04-18
Payer: COMMERCIAL

## 2023-04-19 ENCOUNTER — APPOINTMENT (OUTPATIENT)
Facility: HOSPITAL | Age: 58
End: 2023-04-19
Payer: COMMERCIAL

## 2023-04-24 ENCOUNTER — HOSPITAL ENCOUNTER (OUTPATIENT)
Facility: HOSPITAL | Age: 58
Setting detail: RECURRING SERIES
Discharge: HOME | End: 2023-04-27
Payer: OTHER GOVERNMENT

## 2023-04-24 PROCEDURE — 97112 NEUROMUSCULAR REEDUCATION: CPT

## 2023-04-24 PROCEDURE — 97110 THERAPEUTIC EXERCISES: CPT

## 2023-04-24 PROCEDURE — 97530 THERAPEUTIC ACTIVITIES: CPT

## 2023-04-24 PROCEDURE — 97140 MANUAL THERAPY 1/> REGIONS: CPT

## 2023-04-24 NOTE — PROGRESS NOTES
Elana Stoner, PT [facility] [address]   5/8/2023  3:20 PM Jose Mohamud, PTA [facility] [address]   5/10/2023  3:20 PM Elana Stoner, PT [facility] [address]   5/15/2023  3:20 PM Susan Palma, PT [facility] [address]   5/17/2023  3:20 PM Nate Olvera, PT [facility] [address]   5/22/2023  3:20 PM Nate Olvera, PT [facility] [address]   5/24/2023  3:20 PM Nate Olvera, PT [facility] [address]   5/30/2023  3:20 PM [facility] [address]

## 2023-04-26 ENCOUNTER — APPOINTMENT (OUTPATIENT)
Facility: HOSPITAL | Age: 58
End: 2023-04-26
Payer: COMMERCIAL

## 2023-04-28 ENCOUNTER — HOSPITAL ENCOUNTER (OUTPATIENT)
Facility: HOSPITAL | Age: 58
Discharge: HOME | End: 2023-04-28
Payer: OTHER GOVERNMENT

## 2023-04-28 ENCOUNTER — OFFICE VISIT (OUTPATIENT)
Age: 58
End: 2023-04-28
Payer: OTHER GOVERNMENT

## 2023-04-28 DIAGNOSIS — M25.662 STIFFNESS OF LEFT KNEE: ICD-10-CM

## 2023-04-28 DIAGNOSIS — Z96.652 PRESENCE OF LEFT ARTIFICIAL KNEE JOINT: Primary | ICD-10-CM

## 2023-04-28 LAB
CRP SERPL-MCNC: <0.3 MG/DL (ref 0–0.3)
ERYTHROCYTE [DISTWIDTH] IN BLOOD BY AUTOMATED COUNT: 13.8 % (ref 11.6–14.5)
ERYTHROCYTE [SEDIMENTATION RATE] IN BLOOD: 16 MM/HR (ref 0–20)
HCT VFR BLD AUTO: 37.8 % (ref 36–48)
HGB BLD-MCNC: 12.3 G/DL (ref 13–16)
MCH RBC QN AUTO: 29.6 PG (ref 24–34)
MCHC RBC AUTO-ENTMCNC: 32.5 G/DL (ref 31–37)
MCV RBC AUTO: 90.9 FL (ref 78–100)
NRBC # BLD: 0 K/UL (ref 0–0.01)
NRBC BLD-RTO: 0 PER 100 WBC
PLATELET # BLD AUTO: 294 K/UL (ref 135–420)
PMV BLD AUTO: 11.5 FL (ref 9.2–11.8)
RBC # BLD AUTO: 4.16 M/UL (ref 4.35–5.65)
URATE SERPL-MCNC: 3.3 MG/DL (ref 2.6–7.2)
WBC # BLD AUTO: 8.8 K/UL (ref 4.6–13.2)

## 2023-04-28 PROCEDURE — 99024 POSTOP FOLLOW-UP VISIT: CPT | Performed by: PHYSICIAN ASSISTANT

## 2023-04-28 PROCEDURE — 36415 COLL VENOUS BLD VENIPUNCTURE: CPT

## 2023-04-28 PROCEDURE — 86140 C-REACTIVE PROTEIN: CPT

## 2023-04-28 PROCEDURE — 84550 ASSAY OF BLOOD/URIC ACID: CPT

## 2023-04-28 PROCEDURE — 83529 ASAY OF INTERLEUKIN-6 (IL-6): CPT

## 2023-04-28 PROCEDURE — 85027 COMPLETE CBC AUTOMATED: CPT

## 2023-04-28 PROCEDURE — 73562 X-RAY EXAM OF KNEE 3: CPT | Performed by: PHYSICIAN ASSISTANT

## 2023-04-28 PROCEDURE — 85652 RBC SED RATE AUTOMATED: CPT

## 2023-04-28 RX ORDER — HYDROCODONE BITARTRATE AND ACETAMINOPHEN 7.5; 325 MG/1; MG/1
1-2 TABLET ORAL EVERY 8 HOURS PRN
Qty: 42 TABLET | Refills: 0 | Status: SHIPPED | OUTPATIENT
Start: 2023-04-28 | End: 2023-05-05

## 2023-04-28 NOTE — PROGRESS NOTES
Patient: Angel Womack                MRN: [id number]       SSN: [id number]  YOB: 1965        AGE: 62 y.o. SEX: male  There is no height or weight on file to calculate BMI. PCP: Annalee El MD  04/28/23      This office note has been dictated. REVIEW OF SYSTEMS:  Constitutional: Negative for fever, chills, weight loss and malaise/fatigue. HENT: Negative. Eyes: Negative. Respiratory: Negative. Cardiovascular: Negative. Gastrointestinal: No bowel incontinence or constipation. Genitourinary: No bladder incontinence or saddle anesthesia. Skin: Negative. Neurological: Negative. Endo/Heme/Allergies: Negative. Psychiatric/Behavioral: Negative. Musculoskeletal: As per HPI above. Past Medical History:   Diagnosis Date    Chronic back pain     Diabetes ([facility] [address]     Hypertension     Unspecified sleep apnea     Use CPAP         Current Outpatient Medications:     amLODIPine (NORVASC) 10 MG tablet, Take by mouth daily, Disp: , Rfl:     atorvastatin (LIPITOR) 10 MG tablet, Take by mouth daily, Disp: , Rfl:     vitamin D 25 MCG (1000 UT) CAPS, Take by mouth daily, Disp: , Rfl:     hydroCHLOROthiazide (HYDRODIURIL) 25 MG tablet, Take 25 mg by mouth daily, Disp: , Rfl:     insulin glargine (LANTUS SOLOSTAR) 100 UNIT/ML injection pen, Inject 50 Units into the skin daily, Disp: , Rfl:     levothyroxine (SYNTHROID) 175 MCG tablet, Take 175 mcg by mouth every morning (before breakfast), Disp: , Rfl:     losartan (COZAAR) 100 MG tablet, Take 100 mg by mouth daily, Disp: , Rfl:     metFORMIN (GLUCOPHAGE) 1000 MG tablet, Take 1,000 mg by mouth 2 times daily (with meals), Disp: , Rfl:     potassium chloride (MICRO-K) 10 MEQ extended release capsule, Take 10 mEq by mouth daily, Disp: , Rfl:     traMADol (ULTRAM) 50 MG tablet, Take 50 mg by mouth every 6 hours as needed. , Disp: , Rfl:     No Known Allergies    Social History     Socioeconomic History    Marital status:

## 2023-04-29 LAB — IL6 SERPL-MCNC: <2.5 PG/ML (ref 0–13)

## 2023-04-30 ENCOUNTER — ANESTHESIA EVENT (OUTPATIENT)
Facility: HOSPITAL | Age: 58
End: 2023-04-30
Payer: OTHER GOVERNMENT

## 2023-05-01 ENCOUNTER — HOSPITAL ENCOUNTER (OUTPATIENT)
Facility: HOSPITAL | Age: 58
Discharge: HOME | End: 2023-05-01
Attending: ORTHOPAEDIC SURGERY | Admitting: ORTHOPAEDIC SURGERY
Payer: OTHER GOVERNMENT

## 2023-05-01 ENCOUNTER — APPOINTMENT (OUTPATIENT)
Facility: HOSPITAL | Age: 58
End: 2023-05-01
Payer: OTHER GOVERNMENT

## 2023-05-01 ENCOUNTER — ANESTHESIA (OUTPATIENT)
Facility: HOSPITAL | Age: 58
End: 2023-05-01
Payer: OTHER GOVERNMENT

## 2023-05-01 VITALS
HEART RATE: 88 BPM | SYSTOLIC BLOOD PRESSURE: 141 MMHG | TEMPERATURE: 98.2 F | OXYGEN SATURATION: 96 % | HEIGHT: 72 IN | DIASTOLIC BLOOD PRESSURE: 88 MMHG | BODY MASS INDEX: 33.59 KG/M2 | WEIGHT: 248 LBS | RESPIRATION RATE: 15 BRPM

## 2023-05-01 PROBLEM — M24.669 ARTHROFIBROSIS OF KNEE JOINT, UNSPECIFIED LATERALITY: Status: ACTIVE | Noted: 2023-05-01

## 2023-05-01 PROBLEM — Z91.199 NONCOMPLIANCE: Status: ACTIVE | Noted: 2023-05-01

## 2023-05-01 LAB
EST. AVERAGE GLUCOSE BLD GHB EST-MCNC: 157 MG/DL
GLUCOSE BLD STRIP.AUTO-MCNC: 114 MG/DL (ref 70–110)
GLUCOSE BLD STRIP.AUTO-MCNC: 130 MG/DL (ref 70–110)
HBA1C MFR BLD: 7.1 % (ref 4.2–5.6)

## 2023-05-01 PROCEDURE — 2580000003 HC RX 258: Performed by: NURSE ANESTHETIST, CERTIFIED REGISTERED

## 2023-05-01 PROCEDURE — 3700000000 HC ANESTHESIA ATTENDED CARE: Performed by: ORTHOPAEDIC SURGERY

## 2023-05-01 PROCEDURE — 27570 FIXATION OF KNEE JOINT: CPT | Performed by: ORTHOPAEDIC SURGERY

## 2023-05-01 PROCEDURE — 2500000003 HC RX 250 WO HCPCS: Performed by: NURSE ANESTHETIST, CERTIFIED REGISTERED

## 2023-05-01 PROCEDURE — 7100000011 HC PHASE II RECOVERY - ADDTL 15 MIN: Performed by: ORTHOPAEDIC SURGERY

## 2023-05-01 PROCEDURE — 6360000002 HC RX W HCPCS: Performed by: NURSE ANESTHETIST, CERTIFIED REGISTERED

## 2023-05-01 PROCEDURE — 82962 GLUCOSE BLOOD TEST: CPT

## 2023-05-01 PROCEDURE — 6370000000 HC RX 637 (ALT 250 FOR IP): Performed by: NURSE ANESTHETIST, CERTIFIED REGISTERED

## 2023-05-01 PROCEDURE — 01380 ANES ALL CLSD PX KNEE JOINT: CPT | Performed by: ANESTHESIOLOGY

## 2023-05-01 PROCEDURE — 83036 HEMOGLOBIN GLYCOSYLATED A1C: CPT

## 2023-05-01 PROCEDURE — 2700000000 HC OXYGEN THERAPY PER DAY

## 2023-05-01 PROCEDURE — 3700000001 HC ADD 15 MINUTES (ANESTHESIA): Performed by: ORTHOPAEDIC SURGERY

## 2023-05-01 PROCEDURE — 3600000012 HC SURGERY LEVEL 2 ADDTL 15MIN: Performed by: ORTHOPAEDIC SURGERY

## 2023-05-01 PROCEDURE — 7100000001 HC PACU RECOVERY - ADDTL 15 MIN: Performed by: ORTHOPAEDIC SURGERY

## 2023-05-01 PROCEDURE — 3600000002 HC SURGERY LEVEL 2 BASE: Performed by: ORTHOPAEDIC SURGERY

## 2023-05-01 PROCEDURE — 7100000010 HC PHASE II RECOVERY - FIRST 15 MIN: Performed by: ORTHOPAEDIC SURGERY

## 2023-05-01 PROCEDURE — 7100000000 HC PACU RECOVERY - FIRST 15 MIN: Performed by: ORTHOPAEDIC SURGERY

## 2023-05-01 RX ORDER — DEXTROSE MONOHYDRATE 100 MG/ML
INJECTION, SOLUTION INTRAVENOUS CONTINUOUS PRN
Status: DISCONTINUED | OUTPATIENT
Start: 2023-05-01 | End: 2023-05-01 | Stop reason: HOSPADM

## 2023-05-01 RX ORDER — ONDANSETRON 2 MG/ML
4 INJECTION INTRAMUSCULAR; INTRAVENOUS
Status: DISCONTINUED | OUTPATIENT
Start: 2023-05-01 | End: 2023-05-01 | Stop reason: HOSPADM

## 2023-05-01 RX ORDER — INSULIN LISPRO 100 [IU]/ML
0-4 INJECTION, SOLUTION INTRAVENOUS; SUBCUTANEOUS EVERY 4 HOURS
Status: DISCONTINUED | OUTPATIENT
Start: 2023-05-01 | End: 2023-05-01 | Stop reason: HOSPADM

## 2023-05-01 RX ORDER — FENTANYL CITRATE 50 UG/ML
INJECTION, SOLUTION INTRAMUSCULAR; INTRAVENOUS PRN
Status: DISCONTINUED | OUTPATIENT
Start: 2023-05-01 | End: 2023-05-01 | Stop reason: SDUPTHER

## 2023-05-01 RX ORDER — SODIUM CHLORIDE 0.9 % (FLUSH) 0.9 %
5-40 SYRINGE (ML) INJECTION PRN
Status: DISCONTINUED | OUTPATIENT
Start: 2023-05-01 | End: 2023-05-01 | Stop reason: HOSPADM

## 2023-05-01 RX ORDER — INSULIN LISPRO 100 [IU]/ML
0-4 INJECTION, SOLUTION INTRAVENOUS; SUBCUTANEOUS NIGHTLY
Status: DISCONTINUED | OUTPATIENT
Start: 2023-05-01 | End: 2023-05-01 | Stop reason: HOSPADM

## 2023-05-01 RX ORDER — FENTANYL CITRATE 50 UG/ML
25 INJECTION, SOLUTION INTRAMUSCULAR; INTRAVENOUS EVERY 5 MIN PRN
Status: DISCONTINUED | OUTPATIENT
Start: 2023-05-01 | End: 2023-05-01 | Stop reason: HOSPADM

## 2023-05-01 RX ORDER — SODIUM CHLORIDE, SODIUM LACTATE, POTASSIUM CHLORIDE, CALCIUM CHLORIDE 600; 310; 30; 20 MG/100ML; MG/100ML; MG/100ML; MG/100ML
INJECTION, SOLUTION INTRAVENOUS CONTINUOUS
Status: DISCONTINUED | OUTPATIENT
Start: 2023-05-01 | End: 2023-05-01 | Stop reason: HOSPADM

## 2023-05-01 RX ORDER — SODIUM CHLORIDE 9 MG/ML
INJECTION, SOLUTION INTRAVENOUS PRN
Status: DISCONTINUED | OUTPATIENT
Start: 2023-05-01 | End: 2023-05-01 | Stop reason: HOSPADM

## 2023-05-01 RX ORDER — PROPOFOL 10 MG/ML
INJECTION, EMULSION INTRAVENOUS PRN
Status: DISCONTINUED | OUTPATIENT
Start: 2023-05-01 | End: 2023-05-01 | Stop reason: SDUPTHER

## 2023-05-01 RX ORDER — SODIUM CHLORIDE, SODIUM LACTATE, POTASSIUM CHLORIDE, CALCIUM CHLORIDE 600; 310; 30; 20 MG/100ML; MG/100ML; MG/100ML; MG/100ML
INJECTION, SOLUTION INTRAVENOUS CONTINUOUS PRN
Status: DISCONTINUED | OUTPATIENT
Start: 2023-05-01 | End: 2023-05-01 | Stop reason: SDUPTHER

## 2023-05-01 RX ORDER — LIDOCAINE HYDROCHLORIDE 20 MG/ML
INJECTION, SOLUTION EPIDURAL; INFILTRATION; INTRACAUDAL; PERINEURAL PRN
Status: DISCONTINUED | OUTPATIENT
Start: 2023-05-01 | End: 2023-05-01 | Stop reason: SDUPTHER

## 2023-05-01 RX ORDER — INSULIN LISPRO 100 [IU]/ML
0-15 INJECTION, SOLUTION INTRAVENOUS; SUBCUTANEOUS ONCE
Status: DISCONTINUED | OUTPATIENT
Start: 2023-05-01 | End: 2023-05-01 | Stop reason: HOSPADM

## 2023-05-01 RX ORDER — SODIUM CHLORIDE 0.9 % (FLUSH) 0.9 %
5-40 SYRINGE (ML) INJECTION EVERY 12 HOURS SCHEDULED
Status: DISCONTINUED | OUTPATIENT
Start: 2023-05-01 | End: 2023-05-01 | Stop reason: HOSPADM

## 2023-05-01 RX ORDER — MIDAZOLAM HYDROCHLORIDE 1 MG/ML
INJECTION INTRAMUSCULAR; INTRAVENOUS PRN
Status: DISCONTINUED | OUTPATIENT
Start: 2023-05-01 | End: 2023-05-01 | Stop reason: SDUPTHER

## 2023-05-01 RX ORDER — INSULIN LISPRO 100 [IU]/ML
0-4 INJECTION, SOLUTION INTRAVENOUS; SUBCUTANEOUS
Status: DISCONTINUED | OUTPATIENT
Start: 2023-05-01 | End: 2023-05-01 | Stop reason: HOSPADM

## 2023-05-01 RX ORDER — SUCCINYLCHOLINE/SOD CL,ISO/PF 100 MG/5ML
SYRINGE (ML) INTRAVENOUS PRN
Status: DISCONTINUED | OUTPATIENT
Start: 2023-05-01 | End: 2023-05-01 | Stop reason: SDUPTHER

## 2023-05-01 RX ORDER — FAMOTIDINE 20 MG/1
20 TABLET, FILM COATED ORAL ONCE
Status: COMPLETED | OUTPATIENT
Start: 2023-05-01 | End: 2023-05-01

## 2023-05-01 RX ADMIN — FAMOTIDINE 20 MG: 20 TABLET ORAL at 12:25

## 2023-05-01 RX ADMIN — PROPOFOL 200 MG: 10 INJECTION, EMULSION INTRAVENOUS at 12:40

## 2023-05-01 RX ADMIN — Medication 60 MG: at 12:41

## 2023-05-01 RX ADMIN — SODIUM CHLORIDE, SODIUM LACTATE, POTASSIUM CHLORIDE, AND CALCIUM CHLORIDE: 600; 310; 30; 20 INJECTION, SOLUTION INTRAVENOUS at 12:34

## 2023-05-01 RX ADMIN — FENTANYL CITRATE 50 MCG: 50 INJECTION, SOLUTION INTRAMUSCULAR; INTRAVENOUS at 12:34

## 2023-05-01 RX ADMIN — FENTANYL CITRATE 50 MCG: 50 INJECTION, SOLUTION INTRAMUSCULAR; INTRAVENOUS at 12:41

## 2023-05-01 RX ADMIN — SODIUM CHLORIDE, POTASSIUM CHLORIDE, SODIUM LACTATE AND CALCIUM CHLORIDE: 600; 310; 30; 20 INJECTION, SOLUTION INTRAVENOUS at 12:25

## 2023-05-01 RX ADMIN — MIDAZOLAM HYDROCHLORIDE 2 MG: 2 INJECTION, SOLUTION INTRAMUSCULAR; INTRAVENOUS at 12:34

## 2023-05-01 RX ADMIN — LIDOCAINE HYDROCHLORIDE 100 MG: 20 INJECTION, SOLUTION EPIDURAL; INFILTRATION; INTRACAUDAL; PERINEURAL at 12:40

## 2023-05-01 ASSESSMENT — PAIN DESCRIPTION - PAIN TYPE: TYPE: SURGICAL PAIN

## 2023-05-01 ASSESSMENT — PAIN SCALES - GENERAL: PAINLEVEL_OUTOF10: 2

## 2023-05-01 ASSESSMENT — PAIN - FUNCTIONAL ASSESSMENT
PAIN_FUNCTIONAL_ASSESSMENT: 0-10
PAIN_FUNCTIONAL_ASSESSMENT: ACTIVITIES ARE NOT PREVENTED

## 2023-05-01 ASSESSMENT — PAIN DESCRIPTION - DESCRIPTORS: DESCRIPTORS: THROBBING

## 2023-05-01 ASSESSMENT — PAIN DESCRIPTION - LOCATION: LOCATION: KNEE

## 2023-05-01 ASSESSMENT — PAIN DESCRIPTION - ORIENTATION: ORIENTATION: LEFT

## 2023-05-01 NOTE — DISCHARGE INSTRUCTIONS
Follow up with physical therapy on tomorrow May 2, 2023 per Dr. Jeremías Mccabe. Call them today as ask to be seen in the morning. Dr. Jeremías Mccabe is aware of May 3rd appointment but wants patient to have therapy beginning in the am.    Ice twenty minutes on and twenty minutes off. Take perscription as ordered on Friday by Margarito Acevedo PA-C.    DISCHARGE SUMMARY from Nurse    PATIENT INSTRUCTIONS:    After general anesthesia or intravenous sedation, for 24 hours or while taking prescription Narcotics:  Limit your activities  Do not drive and operate hazardous machinery  Do not make important personal or business decisions  Do  not drink alcoholic beverages  If you have not urinated within 8 hours after discharge, please contact your surgeon on call. Report the following to your surgeon:  Excessive pain, swelling, redness or odor of or around the surgical area  Temperature over 100.5  Nausea and vomiting lasting longer than 4 hours or if unable to take medications  Any signs of decreased circulation or nerve impairment to extremity: change in color, persistent  numbness, tingling, coldness or increase pain  Any questions      These are general instructions for a healthy lifestyle:    No smoking/ No tobacco products/ Avoid exposure to second hand smoke  Surgeon General's Warning:  Quitting smoking now greatly reduces serious risk to your health. Obesity, smoking, and sedentary lifestyle greatly increases your risk for illness    A healthy diet, regular physical exercise & weight monitoring are important for maintaining a healthy lifestyle    You may be retaining fluid if you have a history of heart failure or if you experience any of the following symptoms:  Weight gain of 3 pounds or more overnight or 5 pounds in a week, increased swelling in our hands or feet or shortness of breath while lying flat in bed.   Please call your doctor as soon as you notice any of these symptoms; do not wait until your next office

## 2023-05-01 NOTE — ANESTHESIA POSTPROCEDURE EVALUATION
Department of Anesthesiology  Postprocedure Note    Patient: Karma Fitzgerald  MRN: [id number]  YOB: 1965  Date of evaluation: 5/1/2023      Procedure Summary     Date: 05/01/23 Room / Location: [facility] MAIN 07 / [facility] MAIN OR    Anesthesia Start: 1234 Anesthesia Stop: 7700    Procedure: LAURI KNEE MANIPULATION (Left: Knee) Diagnosis:       Stiffness of left knee      (Stiffness of left knee [M25.662])    Surgeons: Spencer Jacome MD Responsible Provider: Laura Donohue MD    Anesthesia Type: General ASA Status: 3          Anesthesia Type: General    Isis Phase I: Isis Score: 10    Isis Phase II: Isis Score: 10      Anesthesia Post Evaluation    Patient location during evaluation: bedside  Patient participation: complete - patient participated  Airway patency: patent  Complications: no  Cardiovascular status: hemodynamically stable  Respiratory status: acceptable  Hydration status: stable

## 2023-05-01 NOTE — ANESTHESIA PRE PROCEDURE
Department of Anesthesiology  Preprocedure Note       Name:  Pacheco Hamilton   Age:  62 y.o.  :  1965                                          MRN:  [id number]         Date:  2023      Surgeon: Beverly Santos):  Usha Arriaga MD    Procedure: Procedure(s):  LAURI KNEE MANIPULATION    Medications prior to admission:   Prior to Admission medications    Medication Sig Start Date End Date Taking? Authorizing Provider   HYDROcodone-acetaminophen (NORCO) 7.5-325 MG per tablet Take 1-2 tablets by mouth every 8 hours as needed for Pain for up to 7 days. Intended supply: 30 days Max Daily Amount: 6 tablets 23  Raphael Craft PA-C   amLODIPine (NORVASC) 10 MG tablet Take by mouth daily    Ar Automatic Reconciliation   atorvastatin (LIPITOR) 10 MG tablet Take by mouth daily    Ar Automatic Reconciliation   vitamin D 25 MCG (1000 UT) CAPS Take by mouth daily    Ar Automatic Reconciliation   hydroCHLOROthiazide (HYDRODIURIL) 25 MG tablet Take 25 mg by mouth daily    Ar Automatic Reconciliation   insulin glargine (LANTUS SOLOSTAR) 100 UNIT/ML injection pen Inject 50 Units into the skin daily    Ar Automatic Reconciliation   levothyroxine (SYNTHROID) 175 MCG tablet Take 175 mcg by mouth every morning (before breakfast)    Ar Automatic Reconciliation   losartan (COZAAR) 100 MG tablet Take 100 mg by mouth daily    Ar Automatic Reconciliation   metFORMIN (GLUCOPHAGE) 1000 MG tablet Take 1,000 mg by mouth 2 times daily (with meals)    Ar Automatic Reconciliation   potassium chloride (MICRO-K) 10 MEQ extended release capsule Take 10 mEq by mouth daily    Ar Automatic Reconciliation   traMADol (ULTRAM) 50 MG tablet Take 50 mg by mouth every 6 hours as needed.  18   Ar Automatic Reconciliation       Current medications:    Current Facility-Administered Medications   Medication Dose Route Frequency Provider Last Rate Last Admin    sodium chloride flush 0.9 % injection 5-40 mL  5-40 mL IntraVENous 2 times per day

## 2023-05-01 NOTE — INTERVAL H&P NOTE
Update History & Physical    The patient's History and Physical of May 1, 2023 was reviewed with the patient and I examined the patient. There was no change. The surgical site was confirmed by the patient and me. Plan: The risks, benefits, expected outcome, and alternative to the recommended procedure have been discussed with the patient. Patient understands and wants to proceed with the procedure.      Electronically signed by Earleen Aschoff, MD on 5/1/2023 at 11:53 AM

## 2023-05-01 NOTE — BRIEF OP NOTE
Brief Postoperative Note      Patient: Kirstin Neumann  YOB: 1965  MRN: [id number]    Date of Procedure: 5/1/2023    Pre-Op Diagnosis Codes:     * Stiffness of left knee [M25.662]    Post-Op Diagnosis: Same       Procedure(s):  LAURI KNEE MANIPULATION    Surgeon(s):  Dee Gar MD    Assistant:  Surgical Assistant: Mari Arora Phasadiq    Anesthesia: General    Estimated Blood Loss (mL): n/a    Complications: None    Specimens:   * No specimens in log *    Implants:  * No implants in log *      Drains: * No LDAs found *    Findings: same      Electronically signed by Lisa Mitchell MD on 5/1/2023 at 12:46 PM

## 2023-05-02 ENCOUNTER — HOSPITAL ENCOUNTER (OUTPATIENT)
Facility: HOSPITAL | Age: 58
Setting detail: RECURRING SERIES
Discharge: HOME | End: 2023-05-05
Payer: OTHER GOVERNMENT

## 2023-05-02 PROCEDURE — 97016 VASOPNEUMATIC DEVICE THERAPY: CPT

## 2023-05-02 PROCEDURE — 97112 NEUROMUSCULAR REEDUCATION: CPT

## 2023-05-02 PROCEDURE — 97110 THERAPEUTIC EXERCISES: CPT

## 2023-05-02 NOTE — PROGRESS NOTES
[facility] [place] [address]. [address]   Phone: [phone] Fax: [phone]  PROGRESS NOTE  Patient Name: Mary Kay Amato : 1965   Treatment/Medical Diagnosis: Pain in left knee [M25.562]   Referral Source: Shannan Trejo PA-C     Date of Initial Visit: 2023 Attended Visits: 25 Missed Visits: 7     SUMMARY OF TREATMENT  Treatment has consisted of ROM and strengthening exercises for left LE, cold packs and vasopneumatic device for edema control, manual techniques to decrease edema and increase ROM, patient education, and home exercise program.   CURRENT STATUS  Patient presents to therapy today reporting a manipulation under anesthesia performed yesterday. He ambulates into clinic with antalgic limp on left LE and lacking terminal knee extension on left. Girth measurements in left knee at mid patella: 48.0 cm  Left knee AAROM flexion: 100 deg, ext to 15 deg from full extension. Progress to Goals:     1. Pt will be independent with HEP at D/C for self management. Current status: Reissued HEP of ROM exercises and emphasized importance of compliance with HEP   2. Pt will demonstrate left knee flexion AROM to at least 105 to improve stair negotiation. Current status: left knee AAROM to 100 deg   3. Pt will ambulate 1 mile to improve quality of life. Current status: NT   4. Pt will increase FOTO Functional Status score to 64 to decrease functional limitations. Current status: NT   5. Pt will demonstrate left knee extension strength of at least 5/5 to engage in age appropriate activities. Current status: NT            Non-Medicare, can change goals, can adjust or add frequency duration, no signature required      New Goals to be achieved in __4__ weeks  1. Pt will be independent with HEP at D/C for self management. 2. Pt will demonstrate left knee flexion AROM to at least 105 to improve stair negotiation.    3. Pt will

## 2023-05-02 NOTE — OP NOTE
complications associated with the procedure, and the patient awoken, taken to the recovery room in stable condition without complication.       Irvin Kelly MD [id number]  D:  05/01/2023 12:51  T:  05/02/2023 0:44  JOB #:  [id number]

## 2023-05-02 NOTE — PROGRESS NOTES
PHYSICAL THERAPY - DAILY TREATMENT NOTE (updated )    Patient Name: Karma Fitzgerald    Date: 2023    : 1965  Insurance: Payor: Cristafaisal Nye / Plan: Fitz Nye NAP CHOICE POS II / Product Type: *No Product type* /      Patient  verified yes     Visit #   Current / Total 25 36   Time   In / Out 10:38 11:32   Pain   In / Out 1/10 0/10   Subjective Functional Status/Changes: Reports he had a manipulation under anesthesia yesterday morning. \"It doesn't feel like the same pain now. It used to feel like it was locking. I can tolerate this pain. \"   Changes to:  Meds, Allergies, Med Hx, Sx Hx? If yes, update Summary List no       TREATMENT AREA =  Pain in left knee [M25.562]    OBJECTIVE    Modalities Rationale:     decrease edema, decrease inflammation, and decrease pain to improve patient's ability to progress to PLOF and address remaining functional goals. min [] Estim Unattended, type/location:                                      []  w/ice    []  w/heat    min [] Estim Attended, type/location:                                     []  w/US     []  w/ice    []  w/heat    []  TENS insruct      min []  Mechanical Traction: type/lbs                   []  pro   []  sup   []  int   []  cont    []  before manual    []  after manual    min []  Ultrasound, settings/location:      min []  Iontophoresis w/ dexamethasone, location:                                               []  take home patch       []  in clinic    min  unbill []  Ice     []  Heat    location/position:     min []  Paraffin,  details:    10 min []  Vasopneumatic Device, press/temp: Medium pressure, coldest temp    min []  Trini Marques / Jade Dewittly:     If using vaso (only need to measure limb vaso being performed on)      pre-treatment girth : 48.0 cm      post-treatment girth : 45.0 cm      measured at (landmark location) :  mid patella    min []  Other:    Skin assessment post-treatment (if applicable):    [x]  intact    []  redness- no adverse reaction

## 2023-05-03 ENCOUNTER — HOSPITAL ENCOUNTER (OUTPATIENT)
Facility: HOSPITAL | Age: 58
Setting detail: RECURRING SERIES
Discharge: HOME | End: 2023-05-06
Payer: OTHER GOVERNMENT

## 2023-05-03 PROCEDURE — 97112 NEUROMUSCULAR REEDUCATION: CPT

## 2023-05-03 PROCEDURE — 97140 MANUAL THERAPY 1/> REGIONS: CPT

## 2023-05-03 PROCEDURE — 97016 VASOPNEUMATIC DEVICE THERAPY: CPT

## 2023-05-03 PROCEDURE — 97110 THERAPEUTIC EXERCISES: CPT

## 2023-05-03 NOTE — PROGRESS NOTES
PHYSICAL / OCCUPATIONAL THERAPY - DAILY TREATMENT NOTE (updated )    Patient Name: Shira Brand    Date: 5/3/2023    : 1965  Insurance: Payor: Maurisio Menon / Plan: Maurisio Menon NAP CHOICE POS II / Product Type: *No Product type* /      Patient  verified Yes     Visit #   Current / Total 26 36   Time   In / Out 3:20 4:10   Pain   In / Out 1 0   Subjective Functional Status/Changes: Pt reports his knee feels so much better since the manipulation   Changes to:  Meds, Allergies, Med Hx, Sx Hx? If yes, update Summary List no       TREATMENT AREA =  Pain in left knee [M25.562]    OBJECTIVE    Modalities Rationale:     decrease edema, decrease inflammation, and decrease pain to improve patient's ability to progress to PLOF and address remaining functional goals. min [] Estim Unattended, type/location:                                      []  w/ice    []  w/heat    min [] Estim Attended, type/location:                                     []  w/US     []  w/ice    []  w/heat    []  TENS insruct      min []  Mechanical Traction: type/lbs                   []  pro   []  sup   []  int   []  cont    []  before manual    []  after manual    min []  Ultrasound, settings/location:      min []  Iontophoresis w/ dexamethasone, location:                                               []  take home patch       []  in clinic        min  unbilled []  Ice     []  Heat    location/position:     min []  Paraffin,  details:    10 min []  Vasopneumatic Device, press/temp: 34/med    min []  Fleurette Bullion / Jean Manual: If using vaso (only need to measure limb vaso being performed on)      pre-treatment girth : 45 cm      post-treatment girth :       measured at (landmark location) :  toñito-patellar    min []  Other:    Skin assessment post-treatment (if applicable):    [x]  intact    [x]  redness- no adverse reaction                 []redness - adverse reaction:         Therapeutic Procedures:   Tx Min Billable or 1:1 Min (if diff from [employer])

## 2023-05-04 ENCOUNTER — HOSPITAL ENCOUNTER (OUTPATIENT)
Facility: HOSPITAL | Age: 58
Setting detail: RECURRING SERIES
Discharge: HOME | End: 2023-05-07
Payer: OTHER GOVERNMENT

## 2023-05-04 PROCEDURE — 97112 NEUROMUSCULAR REEDUCATION: CPT

## 2023-05-04 PROCEDURE — 97016 VASOPNEUMATIC DEVICE THERAPY: CPT

## 2023-05-04 PROCEDURE — 97140 MANUAL THERAPY 1/> REGIONS: CPT

## 2023-05-04 PROCEDURE — 97110 THERAPEUTIC EXERCISES: CPT

## 2023-05-05 ENCOUNTER — HOSPITAL ENCOUNTER (OUTPATIENT)
Facility: HOSPITAL | Age: 58
Setting detail: RECURRING SERIES
Discharge: HOME | End: 2023-05-08
Payer: OTHER GOVERNMENT

## 2023-05-05 PROCEDURE — 97140 MANUAL THERAPY 1/> REGIONS: CPT

## 2023-05-05 PROCEDURE — 97110 THERAPEUTIC EXERCISES: CPT

## 2023-05-05 PROCEDURE — 97016 VASOPNEUMATIC DEVICE THERAPY: CPT

## 2023-05-08 ENCOUNTER — HOSPITAL ENCOUNTER (OUTPATIENT)
Facility: HOSPITAL | Age: 58
Setting detail: RECURRING SERIES
Discharge: HOME | End: 2023-05-11
Payer: OTHER GOVERNMENT

## 2023-05-08 PROCEDURE — 97140 MANUAL THERAPY 1/> REGIONS: CPT

## 2023-05-08 PROCEDURE — 97016 VASOPNEUMATIC DEVICE THERAPY: CPT

## 2023-05-08 PROCEDURE — 97112 NEUROMUSCULAR REEDUCATION: CPT

## 2023-05-08 PROCEDURE — 97110 THERAPEUTIC EXERCISES: CPT

## 2023-05-08 NOTE — PROGRESS NOTES
PHYSICAL / OCCUPATIONAL THERAPY - DAILY TREATMENT NOTE (updated )    Patient Name: Aristides Vela    Date: 2023    : 1965  Insurance: Payor: Evaline Fleischer / Plan: Evaline Fleischer / Product Type: *No Product type* /      Patient  verified yes     Visit #   Current / Total 29 36   Time   In / Out 3:20 4:20   Pain   In / Out 5 5   Subjective Functional Status/Changes: Pt reports he did a lot of walking over the weekend, states his knee is sore today. Pt states his knee swelled up and got tight with walking. Changes to:  Meds, Allergies, Med Hx, Sx Hx? If yes, update Summary List no       TREATMENT AREA =  Pain in left knee [M25.562]    OBJECTIVE    Modalities Rationale:     decrease edema, decrease inflammation, and decrease pain to improve patient's ability to progress to PLOF and address remaining functional goals. min [] Estim Unattended, type/location:                                      []  w/ice    []  w/heat    min [] Estim Attended, type/location:                                     []  w/US     []  w/ice    []  w/heat    []  TENS insruct      min []  Mechanical Traction: type/lbs                   []  pro   []  sup   []  int   []  cont    []  before manual    []  after manual    min []  Ultrasound, settings/location:      min []  Iontophoresis w/ dexamethasone, location:                                               []  take home patch       []  in clinic    min  unbill []  Ice     []  Heat    location/position:     min []  Paraffin,  details:    15 min [x]  Vasopneumatic Device, press/temp:  Medium pressure, coldest temp, LE wedge     min []  Edison Olivia / Carter Spear:     If using vaso (only need to measure limb vaso being performed on)      pre-treatment girth : 48 cm       post-treatment girth : 47.1 cm       measured at (landmark location) :  mid patella left knee     min []  Other:    Skin assessment post-treatment (if applicable):    [x]  intact    [x]  redness- no adverse reaction

## 2023-05-10 ENCOUNTER — TELEPHONE (OUTPATIENT)
Age: 58
End: 2023-05-10

## 2023-05-10 ENCOUNTER — HOSPITAL ENCOUNTER (OUTPATIENT)
Facility: HOSPITAL | Age: 58
Setting detail: RECURRING SERIES
Discharge: HOME | End: 2023-05-13
Payer: OTHER GOVERNMENT

## 2023-05-10 DIAGNOSIS — Z96.652 PRESENCE OF LEFT ARTIFICIAL KNEE JOINT: Primary | ICD-10-CM

## 2023-05-10 PROCEDURE — 97110 THERAPEUTIC EXERCISES: CPT

## 2023-05-10 PROCEDURE — 97112 NEUROMUSCULAR REEDUCATION: CPT

## 2023-05-10 PROCEDURE — 97140 MANUAL THERAPY 1/> REGIONS: CPT

## 2023-05-10 PROCEDURE — 97016 VASOPNEUMATIC DEVICE THERAPY: CPT

## 2023-05-10 RX ORDER — HYDROCODONE BITARTRATE AND ACETAMINOPHEN 7.5; 325 MG/1; MG/1
1-2 TABLET ORAL EVERY 8 HOURS PRN
Qty: 42 TABLET | Refills: 0 | Status: SHIPPED | OUTPATIENT
Start: 2023-05-10 | End: 2023-05-17

## 2023-05-10 NOTE — PROGRESS NOTES
services to modify and progress therapeutic interventions, analyze and address functional mobility deficits, analyze and address ROM deficits, analyze and address strength deficits, analyze and address soft tissue restrictions, analyze and cue for proper movement patterns, analyze and address imbalance/dizziness, and instruct in home and community integration to address functional deficits and attain remaining goals. Progress toward goals / Updated goals:  []  See Progress Note/Recertification    1. Pt will be independent with HEP at D/C for self management. 2. Pt will demonstrate left knee flexion AROM to at least 105 to improve stair negotiation. 94 degrees AAROM flex    3. Pt will ambulate 1 mile to improve quality of life. 4. Pt will increase FOTO Functional Status score to 64 to decrease functional limitations. 5. Pt will demonstrate left knee extension strength of at least 5/5 to engage in age appropriate activities.      PLAN  Yes  Continue plan of care  []  Upgrade activities as tolerated  []  Discharge due to :  []  Other:    Thony Rivera, PT    5/10/2023    12:30 PM    Future Appointments   Date Time Provider Gucci Lawrence   5/10/2023  3:20 PM Thony Rivera PT [facility]   5/11/2023  8:15 AM Iesha Engle MD [facility] BS AMB   5/15/2023  3:20 PM Lulu Peres PT [facility]   5/17/2023  3:20 PM Harsh Archer PT [facility]   5/22/2023  3:20 PM Harsh Archer PT [facility]   5/24/2023  3:20 PM Harsh Archer PT [facility]   5/30/2023  3:20 PM [facility]

## 2023-05-11 ENCOUNTER — OFFICE VISIT (OUTPATIENT)
Age: 58
End: 2023-05-11

## 2023-05-11 VITALS — HEIGHT: 72 IN | BODY MASS INDEX: 33.63 KG/M2

## 2023-05-11 DIAGNOSIS — M25.662 STIFFNESS OF LEFT KNEE: ICD-10-CM

## 2023-05-11 DIAGNOSIS — Z96.652 PRESENCE OF LEFT ARTIFICIAL KNEE JOINT: Primary | ICD-10-CM

## 2023-05-11 PROCEDURE — 99024 POSTOP FOLLOW-UP VISIT: CPT | Performed by: ORTHOPAEDIC SURGERY

## 2023-05-11 NOTE — PROGRESS NOTES
Patient: Arielle Alas                MRN: [id number]       SSN:   YOB: 1965        AGE: 62 y.o. SEX: male  Body mass index is 33.63 kg/m². PCP: Joe Waters MD  05/11/23    Mr. Julio Cesar Davey returns reevaluation of left knee discomfort he is feeling a lot better with any manipulation he is quite pleased that he is making progress and feeling better he is was going to therapy daily for the first week and then just a couple times a week and the examination today he is missing about 5 degrees of extension is bending to about 9598 degrees or so the incision is very nicely healed there is no evidence for infection or DVT he is comfortable and much happier after manipulation    I think we can get some more motion out of the knee and I have asked him to sit with the leg out straight 10 times a day to facilitate give him a note for work I like him to work from home and I think we can get a little more flexion on him as well and so have him carry on with physical therapy and of course we reminded him the most important thing is what he is doing at home he is doing much better and we are very happy for him as well I will see him back in about 3 to 4 weeks just for range of motion check and am glad that he is doing so much better                  I have personally reviewed the previous 3-view x-rays of the left knee, obtained 04/28/23. Shows left knee replacement which appears to be in excellent position and alignment.     REVIEW OF SYSTEMS:      CON: negative  EYE: negative   ENT: negative  RESP: negative  GI:    negative   :  negative  MSK: Positive  A twelve point review of systems was completed, positives noted and all other systems were reviewed and are negative          Past Medical History:   Diagnosis Date    Chronic back pain     Diabetes ([address]     Hypertension     Unspecified sleep apnea     Use CPAP       Family History   Problem Relation Age of Onset    Hypertension

## 2023-05-15 ENCOUNTER — APPOINTMENT (OUTPATIENT)
Facility: HOSPITAL | Age: 58
End: 2023-05-15
Payer: OTHER GOVERNMENT

## 2023-05-17 ENCOUNTER — HOSPITAL ENCOUNTER (OUTPATIENT)
Facility: HOSPITAL | Age: 58
Setting detail: RECURRING SERIES
Discharge: HOME | End: 2023-05-20
Payer: OTHER GOVERNMENT

## 2023-05-17 PROCEDURE — 97016 VASOPNEUMATIC DEVICE THERAPY: CPT

## 2023-05-17 PROCEDURE — 97110 THERAPEUTIC EXERCISES: CPT

## 2023-05-17 PROCEDURE — 97530 THERAPEUTIC ACTIVITIES: CPT

## 2023-05-17 PROCEDURE — 97116 GAIT TRAINING THERAPY: CPT

## 2023-05-17 NOTE — PROGRESS NOTES
PHYSICAL / OCCUPATIONAL THERAPY - DAILY TREATMENT NOTE (updated )    Patient Name: Zeus Peña    Date: 2023    : 1965  Insurance: Payor: Sharee Monahan / Plan: Sharee Monahan / Product Type: *No Product type* /      Patient  verified YES    Visit #   Current / Total 31 36   Time   In / Out 312 426   Pain   In / Out 1-2 0   Subjective Functional Status/Changes: Pt reporting compliance with HEP. Changes to:  Meds, Allergies, Med Hx, Sx Hx? If yes, update Summary List no       TREATMENT AREA =  Pain in left knee [M25.562]    OBJECTIVE    Modalities Rationale:     decrease edema and decrease pain to improve patient's ability to progress to PLOF and address remaining functional goals. 15 min [x]  Vasopneumatic Device, press/temp: 34/med   If using vaso (only need to measure limb vaso being performed on)      pre-treatment girth : 48 cm      post-treatment girth : 47 cm      measured at (landmark location) :  toñito-patellar   Skin assessment post-treatment (if applicable):    [x]  intact    [x]  redness- no adverse reaction                 []redness - adverse reaction:            Therapeutic Procedures: Tx Min Billable or 1:1 Min (if diff from Tx Min) Procedure, Rationale, Specifics   41  51818 Therapeutic Exercise (timed):  increase ROM, strength, coordination, balance, and proprioception to improve patient's ability to progress to PLOF and address remaining functional goals. (see flow sheet as applicable)     Details if applicable:    See flowsheet     8  78765 Gait Training (timed): To improve safety and dynamic movement with household/community ambulation. (see flow sheet as applicable)     Details if applicable:      Tap Ups with step back in to terminal stance x 15  Standing HR/TR   10  94271 Manual Therapy (timed):  decrease pain, increase ROM, and increase tissue extensibility to improve patient's ability to progress to PLOF and address remaining functional goals.   The manual therapy

## 2023-05-19 ENCOUNTER — HOSPITAL ENCOUNTER (OUTPATIENT)
Facility: HOSPITAL | Age: 58
Setting detail: RECURRING SERIES
Discharge: HOME | End: 2023-05-22
Payer: OTHER GOVERNMENT

## 2023-05-19 PROCEDURE — 97530 THERAPEUTIC ACTIVITIES: CPT

## 2023-05-19 PROCEDURE — 97140 MANUAL THERAPY 1/> REGIONS: CPT

## 2023-05-19 PROCEDURE — 97016 VASOPNEUMATIC DEVICE THERAPY: CPT

## 2023-05-19 PROCEDURE — 97110 THERAPEUTIC EXERCISES: CPT

## 2023-05-22 ENCOUNTER — HOSPITAL ENCOUNTER (OUTPATIENT)
Facility: HOSPITAL | Age: 58
Setting detail: RECURRING SERIES
Discharge: HOME | End: 2023-05-25
Payer: OTHER GOVERNMENT

## 2023-05-22 PROCEDURE — 97110 THERAPEUTIC EXERCISES: CPT

## 2023-05-22 PROCEDURE — 97530 THERAPEUTIC ACTIVITIES: CPT

## 2023-05-22 PROCEDURE — 97016 VASOPNEUMATIC DEVICE THERAPY: CPT

## 2023-05-22 PROCEDURE — 97140 MANUAL THERAPY 1/> REGIONS: CPT

## 2023-05-22 NOTE — PROGRESS NOTES
progress to PLOF and address remaining functional goals. The manual therapy interventions were performed at a separate and distinct time from the therapeutic activities interventions . (see flow sheet as applicable)     Details if applicable:  Knee Flexion/Extension Mobilization with Tibial Posterior/Anterior Mobilization   48  MC BC Totals Reminder: bill using total billable min of TIMED therapeutic procedures (example: do not include dry needle or estim unattended, both untimed codes, in totals to left)  8-22 min = 1 unit; 23-37 min = 2 units; 38-52 min = 3 units; 53-67 min = 4 units; 68-82 min = 5 units   Total Total     [x]  Patient Education billed concurrently with other procedures   [x] Review HEP    [] Progressed/Changed HEP, detail:    [] Other detail:       Objective Information/Functional Measures/Assessment  Prone Knee Hang 4 minutes c 5lb ankle weights  Captain mejia flexion rom: 89 degrees  Heel slide flexion ROM: 91 degrees    Pt tolerated session well and demonstrated moderate apprehension during end range of flexion/extension. Required cuing throughout session. Patient will continue to benefit from skilled PT / OT services to modify and progress therapeutic interventions, analyze and address functional mobility deficits, analyze and address ROM deficits, analyze and address strength deficits, analyze and address soft tissue restrictions, analyze and cue for proper movement patterns, analyze and modify for postural abnormalities, and instruct in home and community integration to address functional deficits and attain remaining goals.     Progress toward goals / Updated goals:  []  See Progress Note/Recertification    Flexion ROM improved by only 2 degrees within session    PLAN  yes Continue plan of care  []  Upgrade activities as tolerated  []  Discharge due to :  []  Other:    Jewels Field, PT    5/22/2023    3:23 PM    Future Appointments   Date Time Provider Gucci Lawrence   5/24/2023

## 2023-05-24 ENCOUNTER — HOSPITAL ENCOUNTER (OUTPATIENT)
Facility: HOSPITAL | Age: 58
Setting detail: RECURRING SERIES
Discharge: HOME | End: 2023-05-27
Payer: OTHER GOVERNMENT

## 2023-05-24 PROCEDURE — 97110 THERAPEUTIC EXERCISES: CPT

## 2023-05-24 PROCEDURE — 97530 THERAPEUTIC ACTIVITIES: CPT

## 2023-05-24 PROCEDURE — 97016 VASOPNEUMATIC DEVICE THERAPY: CPT

## 2023-05-24 PROCEDURE — 97112 NEUROMUSCULAR REEDUCATION: CPT

## 2023-05-24 NOTE — PROGRESS NOTES
PHYSICAL / OCCUPATIONAL THERAPY - DAILY TREATMENT NOTE (updated )    Patient Name: Jennifer Osei    Date: 2023    : 1965  Insurance: Payor: Earlis Lines / Plan: Herrera Lines / Product Type: *No Product type* /      Patient  verified YES    Visit #   Current / Total 34 36   Time   In / Out 320 425   Pain   In / Out 4 4   Subjective Functional Status/Changes: \"I was a little sore (after last session) but I was okay\"   Changes to:  Meds, Allergies, Med Hx, Sx Hx? If yes, update Summary List no       TREATMENT AREA =  Pain in left knee [M25.562]    OBJECTIVE       Modalities Rationale:     decrease edema and decrease pain to improve patient's ability to progress to PLOF and address remaining functional goals. 15 min [x]  Vasopneumatic Device, press/temp: 34/med   If using vaso (only need to measure limb vaso being performed on)      pre-treatment girth : 48 cm      post-treatment girth : 47 cm      measured at (landmark location) :  toñito-patellar   Skin assessment post-treatment (if applicable):    [x]  intact    [x]  redness- no adverse reaction                 []redness - adverse reaction:              Therapeutic Procedures: Tx Min Billable or 1:1 Min (if diff from Tx Min) Procedure, Rationale, Specifics   32  82117 Therapeutic Exercise (timed):  increase ROM, strength, coordination, balance, and proprioception to improve patient's ability to progress to PLOF and address remaining functional goals. (see flow sheet as applicable)     Details if applicable:    See flowsheet     10  12825 Therapeutic Activity (timed):  use of dynamic activities replicating functional movements to increase ROM, strength, coordination, balance, and proprioception in order to improve patient's ability to progress to PLOF and address remaining functional goals.   (see flow sheet as applicable)     Details if applicable:    See flow sheet     8  60101 Neuromuscular Re-Education (timed):  improve balance,

## 2023-05-30 ENCOUNTER — APPOINTMENT (OUTPATIENT)
Facility: HOSPITAL | Age: 58
End: 2023-05-30
Payer: OTHER GOVERNMENT

## 2023-06-02 ENCOUNTER — TELEPHONE (OUTPATIENT)
Age: 58
End: 2023-06-02

## 2023-06-02 DIAGNOSIS — Z96.652 PRESENCE OF LEFT ARTIFICIAL KNEE JOINT: Primary | ICD-10-CM

## 2023-06-02 DIAGNOSIS — G89.18 POST-OP PAIN: ICD-10-CM

## 2023-06-02 RX ORDER — HYDROCODONE BITARTRATE AND ACETAMINOPHEN 10; 325 MG/1; MG/1
1-2 TABLET ORAL EVERY 8 HOURS PRN
Qty: 42 TABLET | Refills: 0 | Status: SHIPPED | OUTPATIENT
Start: 2023-06-02 | End: 2023-06-09

## 2023-06-02 NOTE — TELEPHONE ENCOUNTER
Patient called for medication refill request.     Pharmacy: [facility] on [address] in [address]) 7.5-325 MG per tablet

## 2023-06-06 ENCOUNTER — HOSPITAL ENCOUNTER (OUTPATIENT)
Facility: HOSPITAL | Age: 58
Setting detail: RECURRING SERIES
Discharge: HOME | End: 2023-06-09
Payer: OTHER GOVERNMENT

## 2023-06-06 PROCEDURE — 97530 THERAPEUTIC ACTIVITIES: CPT

## 2023-06-06 PROCEDURE — 97110 THERAPEUTIC EXERCISES: CPT

## 2023-06-06 NOTE — PROGRESS NOTES
Status at last Eval: 44/100 Current Status: 49/100 NOT MET   5. Pt will demonstrate left knee extension strength of at least 5/5 to engage in age appropriate activities. Status at last Eval: 4/5 Current Status: 4/5 NOT MET      RECOMMENDATIONS  DC secondary to lack of progress    If you have any questions/comments please contact us directly at [phone]. Thank you for allowing us to assist in the care of your patient. Therapist Signature: Tej Monge DPT, OCS, Cert.  DN Date: 6/6/23   Reporting Period: NA Time: 8:06 AM

## 2023-06-06 NOTE — PROGRESS NOTES
from skilled PT / OT services to modify and progress therapeutic interventions, analyze and address functional mobility deficits, analyze and address ROM deficits, analyze and address strength deficits, analyze and address soft tissue restrictions, analyze and cue for proper movement patterns, analyze and modify for postural abnormalities, and instruct in home and community integration to address functional deficits and attain remaining goals.     Progress toward goals / Updated goals:  []  See Progress Note/Recertification    See Note    PLAN  no Continue plan of care  []  Upgrade activities as tolerated  []  Discharge due to :  []  Other:    Ilene Zamarripa PT    6/6/2023    8:06 AM    Future Appointments   Date Time Provider Gucci Lawrence   6/8/2023  8:00 AM Ilene Zamarripa PT [facility]   6/14/2023  8:15 AM Armin Ramesh MD [facility]

## 2023-06-08 ENCOUNTER — APPOINTMENT (OUTPATIENT)
Facility: HOSPITAL | Age: 58
End: 2023-06-08
Payer: OTHER GOVERNMENT

## 2023-09-14 ENCOUNTER — OFFICE VISIT (OUTPATIENT)
Age: 58
End: 2023-09-14

## 2023-09-14 ENCOUNTER — HOSPITAL ENCOUNTER (OUTPATIENT)
Facility: HOSPITAL | Age: 58
Discharge: HOME | End: 2023-09-14
Payer: COMMERCIAL

## 2023-09-14 VITALS — WEIGHT: 251 LBS | HEIGHT: 73 IN | BODY MASS INDEX: 33.27 KG/M2

## 2023-09-14 DIAGNOSIS — M25.361 KNEE BUCKLING, RIGHT: ICD-10-CM

## 2023-09-14 DIAGNOSIS — M25.462 SWELLING OF JOINT OF LEFT KNEE: ICD-10-CM

## 2023-09-14 DIAGNOSIS — M17.11 PRIMARY OSTEOARTHRITIS OF RIGHT KNEE: ICD-10-CM

## 2023-09-14 DIAGNOSIS — Z96.652 PRESENCE OF LEFT ARTIFICIAL KNEE JOINT: Primary | ICD-10-CM

## 2023-09-14 DIAGNOSIS — M25.662 STIFFNESS OF LEFT KNEE: ICD-10-CM

## 2023-09-14 DIAGNOSIS — M25.561 RIGHT KNEE PAIN, UNSPECIFIED CHRONICITY: ICD-10-CM

## 2023-09-14 LAB
CRP SERPL-MCNC: 0.4 MG/DL (ref 0–0.3)
ERYTHROCYTE [DISTWIDTH] IN BLOOD BY AUTOMATED COUNT: 12.8 % (ref 11.6–14.5)
ERYTHROCYTE [SEDIMENTATION RATE] IN BLOOD: 17 MM/HR (ref 0–20)
HCT VFR BLD AUTO: 38.7 % (ref 36–48)
HGB BLD-MCNC: 12.9 G/DL (ref 13–16)
MCH RBC QN AUTO: 31.5 PG (ref 24–34)
MCHC RBC AUTO-ENTMCNC: 33.3 G/DL (ref 31–37)
MCV RBC AUTO: 94.4 FL (ref 78–100)
NRBC # BLD: 0 K/UL (ref 0–0.01)
NRBC BLD-RTO: 0 PER 100 WBC
PLATELET # BLD AUTO: 262 K/UL (ref 135–420)
PMV BLD AUTO: 11.5 FL (ref 9.2–11.8)
RBC # BLD AUTO: 4.1 M/UL (ref 4.35–5.65)
URATE SERPL-MCNC: 3.6 MG/DL (ref 2.6–7.2)
WBC # BLD AUTO: 7.2 K/UL (ref 4.6–13.2)

## 2023-09-14 PROCEDURE — 84550 ASSAY OF BLOOD/URIC ACID: CPT

## 2023-09-14 PROCEDURE — 85652 RBC SED RATE AUTOMATED: CPT

## 2023-09-14 PROCEDURE — 36415 COLL VENOUS BLD VENIPUNCTURE: CPT

## 2023-09-14 PROCEDURE — 83529 ASAY OF INTERLEUKIN-6 (IL-6): CPT

## 2023-09-14 PROCEDURE — 85027 COMPLETE CBC AUTOMATED: CPT

## 2023-09-14 PROCEDURE — 86140 C-REACTIVE PROTEIN: CPT

## 2023-09-15 LAB — IL6 SERPL-MCNC: 4.5 PG/ML (ref 0–13)

## 2023-10-09 DIAGNOSIS — Z96.652 PRESENCE OF LEFT ARTIFICIAL KNEE JOINT: ICD-10-CM

## 2023-10-10 RX ORDER — MELOXICAM 15 MG/1
TABLET ORAL
Qty: 30 TABLET | Refills: 3 | Status: SHIPPED | OUTPATIENT
Start: 2023-10-10

## 2024-03-18 ENCOUNTER — TELEPHONE (OUTPATIENT)
Age: 59
End: 2024-03-18

## 2024-03-18 RX ORDER — MELOXICAM 15 MG/1
15 TABLET ORAL DAILY
Qty: 30 TABLET | Refills: 3 | Status: SHIPPED | OUTPATIENT
Start: 2024-03-18

## 2024-03-18 NOTE — TELEPHONE ENCOUNTER
Patient is requesting a refill for meloxicam (MOBIC) 15 MG tablet     Patient tel [phone]    [id number] - [place], [place] - [address] - P [phone] - f [phone]

## 2024-05-01 ENCOUNTER — OFFICE VISIT (OUTPATIENT)
Age: 59
End: 2024-05-01
Payer: COMMERCIAL

## 2024-05-01 VITALS — HEIGHT: 73 IN | WEIGHT: 245 LBS | BODY MASS INDEX: 32.47 KG/M2

## 2024-05-01 DIAGNOSIS — M17.11 PATELLOFEMORAL ARTHRITIS OF RIGHT KNEE: Primary | ICD-10-CM

## 2024-05-01 DIAGNOSIS — Z96.652 PRESENCE OF LEFT ARTIFICIAL KNEE JOINT: ICD-10-CM

## 2024-05-01 DIAGNOSIS — S83.203A OTHER TEAR OF MENISCUS OF RIGHT KNEE, UNSPECIFIED MENISCUS, UNSPECIFIED WHETHER OLD OR CURRENT TEAR, INITIAL ENCOUNTER: ICD-10-CM

## 2024-05-01 DIAGNOSIS — M17.11 PRIMARY OSTEOARTHRITIS OF RIGHT KNEE: ICD-10-CM

## 2024-05-01 PROCEDURE — 20610 DRAIN/INJ JOINT/BURSA W/O US: CPT | Performed by: ORTHOPAEDIC SURGERY

## 2024-05-01 PROCEDURE — 99214 OFFICE O/P EST MOD 30 MIN: CPT | Performed by: ORTHOPAEDIC SURGERY

## 2024-05-01 RX ORDER — MELOXICAM 15 MG/1
TABLET ORAL
Qty: 30 TABLET | Refills: 3 | Status: SHIPPED | OUTPATIENT
Start: 2024-05-01

## 2024-05-01 RX ORDER — BETAMETHASONE SODIUM PHOSPHATE AND BETAMETHASONE ACETATE 3; 3 MG/ML; MG/ML
6 INJECTION, SUSPENSION INTRA-ARTICULAR; INTRALESIONAL; INTRAMUSCULAR; SOFT TISSUE ONCE
Status: COMPLETED | OUTPATIENT
Start: 2024-05-01 | End: 2024-05-01

## 2024-05-01 RX ORDER — LIDOCAINE HYDROCHLORIDE 10 MG/ML
3 INJECTION, SOLUTION INFILTRATION; PERINEURAL ONCE
Status: COMPLETED | OUTPATIENT
Start: 2024-05-01 | End: 2024-05-01

## 2024-05-01 RX ADMIN — LIDOCAINE HYDROCHLORIDE 3 ML: 10 INJECTION, SOLUTION INFILTRATION; PERINEURAL at 14:08

## 2024-05-01 RX ADMIN — BETAMETHASONE SODIUM PHOSPHATE AND BETAMETHASONE ACETATE 6 MG: 3; 3 INJECTION, SUSPENSION INTRA-ARTICULAR; INTRALESIONAL; INTRAMUSCULAR; SOFT TISSUE at 14:07

## 2024-05-01 NOTE — PROGRESS NOTES
allergic reactions for Davie Kay         TIME OUT performed immediately prior to the start of procedure:     ILarry M.D., have performed the following reviews on Davie Kay prior to the start of the procedure:          - Patient was identified by name and date of birth     - Agreement on procedure being performed was verified     - Risks and benefits explained to the patient     - Patient was positioned for comfort     - Consent was signed and verified     - Patient was advised regarding risks of bruising, bleeding, infection and pain          Time: 1:50 PM          Body Part: intra-articular injection of the right knee         Medication and Dose: 1mL Celestone preparation, i.e. 6 mg, and 3 mL 1% lidocaine          Date of Procedure: 05/01/24         PROCEDURE PERFORMED BY : Larry Dong M.D., [facility](C)          Provider Assisted by: Tammy Gordon         Patient assisted by: self          Patient tolerated procedure well with no complications       Past Medical History:   Diagnosis Date    Chronic back pain     Diabetes (HCC)     Hypertension     Unspecified sleep apnea     Use CPAP       Family History   Problem Relation Age of Onset    Hypertension Father     Diabetes Father     No Known Problems Mother        Current Outpatient Medications   Medication Sig Dispense Refill    meloxicam (MOBIC) 15 MG tablet Take 1 tablet by mouth daily 30 tablet 3    meloxicam (MOBIC) 15 MG tablet take 1 tablet by mouth once daily with food or milk if needed 30 tablet 3    amLODIPine (NORVASC) 10 MG tablet Take by mouth daily (Patient not taking: Reported on 5/1/2023)      atorvastatin (LIPITOR) 10 MG tablet Take by mouth daily      vitamin D 25 MCG (1000 UT) CAPS Take by mouth daily      hydroCHLOROthiazide (HYDRODIURIL) 25 MG tablet Take 25 mg by mouth daily (Patient not taking: Reported on 5/1/2023)      insulin glargine (LANTUS SOLOSTAR) 100 UNIT/ML injection pen Inject 50 Units into the skin daily

## 2024-05-08 ENCOUNTER — HOSPITAL ENCOUNTER (OUTPATIENT)
Facility: HOSPITAL | Age: 59
Discharge: HOME | End: 2024-05-11
Attending: ORTHOPAEDIC SURGERY
Payer: OTHER GOVERNMENT

## 2024-05-08 DIAGNOSIS — S83.203A OTHER TEAR OF MENISCUS OF RIGHT KNEE, UNSPECIFIED MENISCUS, UNSPECIFIED WHETHER OLD OR CURRENT TEAR, INITIAL ENCOUNTER: ICD-10-CM

## 2024-05-08 PROCEDURE — 73721 MRI JNT OF LWR EXTRE W/O DYE: CPT

## 2024-05-16 ENCOUNTER — OFFICE VISIT (OUTPATIENT)
Age: 59
End: 2024-05-16
Payer: OTHER GOVERNMENT

## 2024-05-16 VITALS — HEIGHT: 73 IN | WEIGHT: 244 LBS | RESPIRATION RATE: 14 BRPM | BODY MASS INDEX: 32.34 KG/M2

## 2024-05-16 DIAGNOSIS — S83.203A OTHER TEAR OF MENISCUS OF RIGHT KNEE, UNSPECIFIED MENISCUS, UNSPECIFIED WHETHER OLD OR CURRENT TEAR, INITIAL ENCOUNTER: Primary | ICD-10-CM

## 2024-05-16 DIAGNOSIS — M17.11 PRIMARY OSTEOARTHRITIS OF RIGHT KNEE: ICD-10-CM

## 2024-05-16 DIAGNOSIS — Z96.652 PRESENCE OF LEFT ARTIFICIAL KNEE JOINT: ICD-10-CM

## 2024-05-16 DIAGNOSIS — M17.11 PATELLOFEMORAL ARTHRITIS OF RIGHT KNEE: ICD-10-CM

## 2024-05-16 PROCEDURE — 99214 OFFICE O/P EST MOD 30 MIN: CPT | Performed by: ORTHOPAEDIC SURGERY

## 2024-05-16 NOTE — PROGRESS NOTES
fusion X10 Lumbar region       Social History     Socioeconomic History    Marital status:      Spouse name: Not on file    Number of children: Not on file    Years of education: Not on file    Highest education level: Not on file   Occupational History    Not on file   Tobacco Use    Smoking status: Never    Smokeless tobacco: Never   Substance and Sexual Activity    Alcohol use: Not Currently    Drug use: Never    Sexual activity: Yes     Partners: Female   Other Topics Concern    Not on file   Social History Narrative    Not on file     Social Determinants of Health     Financial Resource Strain: Not on file   Food Insecurity: Not on file   Transportation Needs: Not on file   Physical Activity: Inactive (6/20/2022)    Exercise Vital Sign     Days of Exercise per Week: 0 days     Minutes of Exercise per Session: 0 min   Stress: Not on file   Social Connections: Not on file   Intimate Partner Violence: Not At Risk (6/20/2022)    Humiliation, Afraid, Rape, and Kick questionnaire     Fear of Current or Ex-Partner: No     Emotionally Abused: No     Physically Abused: No     Sexually Abused: No   Housing Stability: Not on file       Resp 14   Ht 1.854 m (6' 1\")   Wt 110.7 kg (244 lb)   BMI 32.19 kg/m²       PHYSICAL EXAMINATION:  GENERAL: Alert and oriented x3, in no acute distress, well-developed, well-nourished, afebrile.  HEART: No JVD.  EYES: No scleral icterus   NECK: No significant lymphadenopathy   LUNGS: No respiratory compromise or indrawing  ABDOMEN: Soft, non-tender, non-distended.         Note: This note was completed using voice recognition software. Any typographical/name errors or mistakes are unintentional.    Electronically signed by: Lali Murrieta

## 2024-05-27 SDOH — HEALTH STABILITY: PHYSICAL HEALTH: ON AVERAGE, HOW MANY DAYS PER WEEK DO YOU ENGAGE IN MODERATE TO STRENUOUS EXERCISE (LIKE A BRISK WALK)?: 0 DAYS

## 2024-05-28 ENCOUNTER — OFFICE VISIT (OUTPATIENT)
Age: 59
End: 2024-05-28
Payer: OTHER GOVERNMENT

## 2024-05-28 VITALS — BODY MASS INDEX: 32.34 KG/M2 | HEIGHT: 73 IN | WEIGHT: 244 LBS

## 2024-05-28 DIAGNOSIS — S83.271A COMPLEX TEAR OF LATERAL MENISCUS OF RIGHT KNEE AS CURRENT INJURY, INITIAL ENCOUNTER: ICD-10-CM

## 2024-05-28 DIAGNOSIS — S83.231A COMPLEX TEAR OF MEDIAL MENISCUS OF RIGHT KNEE AS CURRENT INJURY, INITIAL ENCOUNTER: Primary | ICD-10-CM

## 2024-05-28 PROCEDURE — 99214 OFFICE O/P EST MOD 30 MIN: CPT | Performed by: ORTHOPAEDIC SURGERY

## 2024-05-28 NOTE — PROGRESS NOTES
Davie Kay  1965   Chief Complaint   Patient presents with    Knee Pain     rt        HISTORY OF PRESENT ILLNESS  Davie Kay is a 58 y.o. male who presents today for surgical evaluation of right knee. Patient is referred by Larry Dong MD. Office note will be sent to referring provider. Pain is a 5/10. Pain has been present for years, but worse of the past 4 months. Patient received a right knee cortisone injection on 5/1/24 under Dr. Dong with minimal relief. He complains of clicking, popping, and locking up. He attributes increased pain to changes in the weather. He recalls one episode 3-4 months ago where his knee was very swollen.     Has tried following treatments: Injections:Yes; Brace:No; Therapy:No; Cane/Crutch:No      No Known Allergies     Past Medical History:   Diagnosis Date    Chronic back pain     Diabetes (HCC)     Hypertension     Unspecified sleep apnea     Use CPAP      Social History       Tobacco History       Smoking Status  Never      Smokeless Tobacco Use  Never              Alcohol History       Alcohol Use Status  Not Currently Drinks/Week  0 Glasses of wine, 0 Cans of beer, 0 Shots of liquor, 0 Drinks containing 0.5 oz of alcohol per week              Drug Use       Drug Use Status  Never              Sexual Activity       Sexually Active  Yes Partners  Female                   Past Surgical History:   Procedure Laterality Date    KNEE SURGERY Left 5/1/2023    LAURI KNEE MANIPULATION performed by Larry Dong MD at [facility] MAIN OR    NEUROLOGICAL SURGERY      spinal fusion X10 Lumbar region      Family History   Problem Relation Age of Onset    Hypertension Father     Diabetes Father     No Known Problems Mother      Current Outpatient Medications   Medication Sig    meloxicam (MOBIC) 15 MG tablet 1 tab daily as needed with food. Do not mix with NSAIDS.    meloxicam (MOBIC) 15 MG tablet Take 1 tablet by mouth daily    meloxicam (MOBIC) 15 MG tablet take

## 2024-08-22 ENCOUNTER — PREP FOR PROCEDURE (OUTPATIENT)
Age: 59
End: 2024-08-22

## 2024-08-22 PROBLEM — S83.231A COMPLEX TEAR OF MEDIAL MENISCUS OF RIGHT KNEE AS CURRENT INJURY: Status: ACTIVE | Noted: 2024-08-22

## 2024-08-22 PROBLEM — S83.271A COMPLEX TEAR OF LATERAL MENISCUS OF RIGHT KNEE AS CURRENT INJURY: Status: ACTIVE | Noted: 2024-08-22

## 2024-08-26 RX ORDER — CARBOXYMETHYLCELLULOSE SODIUM 5 MG/ML
1 SOLUTION/ DROPS OPHTHALMIC EVERY 6 HOURS PRN
COMMUNITY

## 2024-08-26 RX ORDER — SILDENAFIL 100 MG/1
100 TABLET, FILM COATED ORAL PRN
COMMUNITY
Start: 2023-11-29

## 2024-08-26 NOTE — PROGRESS NOTES
Davie Kay  1965   No chief complaint on file.       HISTORY OF PRESENT ILLNESS  Davie Kay is a 59 y.o. male who presents today for reevaluation of right knee pain. Pain is a ***/10.  Patient denies any fever, chills, chest pain, shortness of breath or calf pain. The remainder of the review of systems is negative. There are no new illness or injuries other than that mentioned above to report since last seen in the office. No changes in medications, allergies, social or family history.      PHYSICAL EXAM:   There were no vitals taken for this visit.  The patient is a well-developed, well-nourished male   in no acute distress.  The patient is alert and oriented times three.  The patient is alert and oriented times three. Mood and affect are normal.  LYMPHATIC: lymph nodes are not enlarged and are within normal limits  SKIN: normal in color and non tender to palpation. There are no bruises or abrasions noted.   NEUROLOGICAL: Motor sensory exam is within normal limits. Reflexes are equal bilaterally. There is normal sensation to pinprick and light touch  MUSCULOSKELETAL:  Examination Left knee Right knee   Skin Intact Intact   Range of motion 0-130 0-130   Effusion - -   Medial joint line tenderness - -   Lateral joint line tenderness - -   Tenderness Pes Bursa - -   Tenderness insertion MCL - -   Tenderness insertion LCL - -   Stephany’s - -   Patella crepitus - -   Patella grind - -   Lachman - -   Pivot shift - -   Anterior drawer - -   Posterior drawer - -   Varus stress - -   Valgus stress - -   Neurovascular Intact Intact   Calf Swelling and Tenderness to Palpation - -   Tracy's Test - -   Hamstring Cord Tightness - -        REVIEW OF SYSTEMS:     Negative for fevers, chills, chest pain, shortness of breath, weight loss, recent illness     General: Negative for fever and chills. No unexpected change in weight.  Denies fatigue. No change in appetite.   Skin: Negative for rash or itching.  [Electronically Signed: Kiko Segura MD. 8/26/2024 2:47 PM EDT]

## 2024-08-26 NOTE — PERIOP NOTE
Instructions for your surgery at [facility]      Today's Date:  8/26/2024      Patient's Name:  Davie Kay           Surgery Date:  08/30/2024              Please enter the main entrance of the hospital and check-in at the front security desk located in the lobby. They will direct you to the area to report for your surgery.     Do NOT eat or drink anything, including candy, gum, or ice chips after midnight prior to your surgery, unless you have specific instructions from your surgeon or anesthesia provider to do so.  Brush your teeth before coming to the hospital. You may swish with water, but do not swallow.  No smoking/Vaping/E-Cigarettes 24 hours prior to the day of surgery.  No alcohol 24 hours prior to the day of surgery.  No recreational drugs for one week prior to the day of surgery.  Bring Photo ID, Insurance information, and Co-pay if required on day of surgery.  Bring in pertinent legal documents, such as, Medical Power of , DNR, Advance Directive, etc.  Leave all valuables, including money/purse, at home.  Remove all jewelry, including ALL body piercings, nail polish, acrylic nails, and makeup (including mascara); no lotions, powders, deodorant, or perfume/cologne/after shave on the skin.  Follow instruction for Hibiclens washes and CHG wipes from surgeon's office.   Glasses and dentures may be worn to the hospital. They must be removed prior to surgery. Please bring case/container for glasses or dentures.   Contact lenses should not be worn on day of surgery.   Call your doctor's office if symptoms of a cold or illness develop within 24-48 hours prior to your surgery.  Call your doctor's office if you have any questions concerning insurance or co-pays.  15. AN ADULT (relative or friend 18 years or older) MUST DRIVE YOU HOME AFTER YOUR SURGERY.  16. Please make arrangements for a responsible adult (18 years or older) to be with you for 24 hours after your surgery.

## 2024-08-27 ENCOUNTER — OFFICE VISIT (OUTPATIENT)
Age: 59
End: 2024-08-27

## 2024-08-27 VITALS — BODY MASS INDEX: 32.82 KG/M2 | DIASTOLIC BLOOD PRESSURE: 84 MMHG | HEIGHT: 72 IN | SYSTOLIC BLOOD PRESSURE: 149 MMHG

## 2024-08-27 DIAGNOSIS — S83.231A COMPLEX TEAR OF MEDIAL MENISCUS OF RIGHT KNEE AS CURRENT INJURY, INITIAL ENCOUNTER: Primary | ICD-10-CM

## 2024-08-27 DIAGNOSIS — S83.271A COMPLEX TEAR OF LATERAL MENISCUS OF RIGHT KNEE AS CURRENT INJURY, INITIAL ENCOUNTER: ICD-10-CM

## 2024-08-27 DIAGNOSIS — S83.271A COMPLEX TEAR OF LATERAL MENISCUS OF RIGHT KNEE AS CURRENT INJURY, INITIAL ENCOUNTER: Primary | ICD-10-CM

## 2024-08-27 RX ORDER — HYDROCODONE BITARTRATE AND ACETAMINOPHEN 7.5; 325 MG/1; MG/1
1 TABLET ORAL EVERY 4 HOURS PRN
Qty: 40 TABLET | Refills: 0 | Status: SHIPPED | OUTPATIENT
Start: 2024-08-27 | End: 2024-09-03

## 2024-08-27 NOTE — H&P
- -   Anterior drawer - -   Posterior drawer - -   Varus stress - -   Valgus stress - -   Neurovascular Intact Intact   Calf Swelling and Tenderness to Palpation - -   Tracy's Test - -   Hamstring Cord Tightness - -     IMAGING:   MRI of the right knee obtained by [facility] dated 5/10/24 reviewed and read by Dr. Dallas:   Horizontal tear in the posterior horn and body of the lateral meniscus. Free edge tear in the posterior horn of the medial meniscus. Mild to moderate cartilage disease in the knee which is most pronounced in the medial femorotibial compartment. Knee effusion.     IMPRESSION:        ICD-10-CM     1. Complex tear of medial meniscus of right knee as current injury, initial encounter  S83.231A         2. Complex tear of lateral meniscus of right knee as current injury, initial encounter  S83.271A               PLAN:   1. Patient presented with right knee pain due to a an MRI-indicated medial meniscus and lateral meniscus tears. I discussed the risks and benefits and potential adverse outcomes of both operative vs non operative treatment of Lateral meniscus and medial meniscus teras with the patient and patient wishes to proceed with arthroscopic partial medial and lateral menisectomies.       Risks of operative intervention include but not limited to bleeding, infection, deep vein thrombosis, pulmonary embolism, death, limb length discrepancy, reflexive sympathetic dystrophy, fat embolism syndrome,damage to blood vessels and nerves, malunion, non-union, delayed union, failure of hardware, post traumatic arthritis, stroke, heart attack, and death.       Patient understands that infection may arise and may require numerous surgeries. The patient was counseled at length about the risks of panfilo Covid-19 during their perioperative period and any recovery window from their procedure.  The patient was made aware that panfilo Covid-19  may worsen their prognosis for recovering from their procedure and

## 2024-08-27 NOTE — PROGRESS NOTES
Office Visit    5/28/2024  [facility] [place]     Expand All Collapse All                       Davie Kay  1965        Chief Complaint   Patient presents with    Knee Pain       rt         HISTORY OF PRESENT ILLNESS  Davie Kay is a 58 y.o. male who presents today for surgical evaluation of right knee. Patient is referred by Larry Dong MD. Office note will be sent to referring provider. Pain is a 5/10. Pain has been present for years, but worse of the past 4 months. Patient received a right knee cortisone injection on 5/1/24 under Dr. Dong with minimal relief. He complains of clicking, popping, and locking up. He attributes increased pain to changes in the weather. He recalls one episode 3-4 months ago where his knee was very swollen.      Has tried following treatments: Injections:Yes; Brace:No; Therapy:No; Cane/Crutch:No        Allergies   No Known Allergies         Past Medical History        Past Medical History:   Diagnosis Date    Chronic back pain      Diabetes (HCC)      Hypertension      Unspecified sleep apnea       Use CPAP         Social History         Tobacco History         Smoking Status  Never        Smokeless Tobacco Use  Never                  Alcohol History         Alcohol Use Status  Not Currently Drinks/Week  0 Glasses of wine, 0 Cans of beer, 0 Shots of liquor, 0 Drinks containing 0.5 oz of alcohol per week                  Drug Use         Drug Use Status  Never                  Sexual Activity         Sexually Active  Yes Partners  Female                       Past Surgical History         Past Surgical History:   Procedure Laterality Date    KNEE SURGERY Left 5/1/2023     LAURI KNEE MANIPULATION performed by Larry Dong MD at [facility] MAIN OR    NEUROLOGICAL SURGERY         spinal fusion X10 Lumbar region         Family History         Family History   Problem Relation Age of Onset    Hypertension Father      Diabetes Father      No

## 2024-08-29 ENCOUNTER — ANESTHESIA EVENT (OUTPATIENT)
Facility: HOSPITAL | Age: 59
End: 2024-08-29
Payer: OTHER GOVERNMENT

## 2024-08-30 ENCOUNTER — HOSPITAL ENCOUNTER (OUTPATIENT)
Facility: HOSPITAL | Age: 59
Setting detail: OUTPATIENT SURGERY
Discharge: HOME | End: 2024-08-30
Attending: ORTHOPAEDIC SURGERY | Admitting: ORTHOPAEDIC SURGERY
Payer: OTHER GOVERNMENT

## 2024-08-30 ENCOUNTER — ANESTHESIA (OUTPATIENT)
Facility: HOSPITAL | Age: 59
End: 2024-08-30
Payer: OTHER GOVERNMENT

## 2024-08-30 VITALS
WEIGHT: 242.2 LBS | TEMPERATURE: 97.3 F | HEART RATE: 83 BPM | BODY MASS INDEX: 32.8 KG/M2 | SYSTOLIC BLOOD PRESSURE: 120 MMHG | HEIGHT: 72 IN | RESPIRATION RATE: 17 BRPM | OXYGEN SATURATION: 95 % | DIASTOLIC BLOOD PRESSURE: 76 MMHG

## 2024-08-30 LAB
GLUCOSE BLD STRIP.AUTO-MCNC: 120 MG/DL (ref 70–110)
GLUCOSE BLD STRIP.AUTO-MCNC: 122 MG/DL (ref 70–110)

## 2024-08-30 PROCEDURE — 7100000010 HC PHASE II RECOVERY - FIRST 15 MIN: Performed by: ORTHOPAEDIC SURGERY

## 2024-08-30 PROCEDURE — 82962 GLUCOSE BLOOD TEST: CPT

## 2024-08-30 PROCEDURE — 6370000000 HC RX 637 (ALT 250 FOR IP): Performed by: NURSE ANESTHETIST, CERTIFIED REGISTERED

## 2024-08-30 PROCEDURE — 3700000001 HC ADD 15 MINUTES (ANESTHESIA): Performed by: ORTHOPAEDIC SURGERY

## 2024-08-30 PROCEDURE — 3700000000 HC ANESTHESIA ATTENDED CARE: Performed by: ORTHOPAEDIC SURGERY

## 2024-08-30 PROCEDURE — 6360000002 HC RX W HCPCS: Performed by: NURSE ANESTHETIST, CERTIFIED REGISTERED

## 2024-08-30 PROCEDURE — 6360000002 HC RX W HCPCS: Performed by: ORTHOPAEDIC SURGERY

## 2024-08-30 PROCEDURE — 7100000000 HC PACU RECOVERY - FIRST 15 MIN: Performed by: ORTHOPAEDIC SURGERY

## 2024-08-30 PROCEDURE — 29880 ARTHRS KNE SRG MNISECTMY M&L: CPT | Performed by: ORTHOPAEDIC SURGERY

## 2024-08-30 PROCEDURE — 2709999900 HC NON-CHARGEABLE SUPPLY: Performed by: ORTHOPAEDIC SURGERY

## 2024-08-30 PROCEDURE — 2580000003 HC RX 258: Performed by: NURSE ANESTHETIST, CERTIFIED REGISTERED

## 2024-08-30 PROCEDURE — 3600000012 HC SURGERY LEVEL 2 ADDTL 15MIN: Performed by: ORTHOPAEDIC SURGERY

## 2024-08-30 PROCEDURE — 3600000002 HC SURGERY LEVEL 2 BASE: Performed by: ORTHOPAEDIC SURGERY

## 2024-08-30 PROCEDURE — 7100000001 HC PACU RECOVERY - ADDTL 15 MIN: Performed by: ORTHOPAEDIC SURGERY

## 2024-08-30 PROCEDURE — 2580000003 HC RX 258: Performed by: ORTHOPAEDIC SURGERY

## 2024-08-30 PROCEDURE — 7100000011 HC PHASE II RECOVERY - ADDTL 15 MIN: Performed by: ORTHOPAEDIC SURGERY

## 2024-08-30 PROCEDURE — 2500000003 HC RX 250 WO HCPCS: Performed by: NURSE ANESTHETIST, CERTIFIED REGISTERED

## 2024-08-30 RX ORDER — SODIUM CHLORIDE, SODIUM LACTATE, POTASSIUM CHLORIDE, CALCIUM CHLORIDE 600; 310; 30; 20 MG/100ML; MG/100ML; MG/100ML; MG/100ML
INJECTION, SOLUTION INTRAVENOUS CONTINUOUS
Status: DISCONTINUED | OUTPATIENT
Start: 2024-08-30 | End: 2024-08-30 | Stop reason: HOSPADM

## 2024-08-30 RX ORDER — ONDANSETRON 2 MG/ML
INJECTION INTRAMUSCULAR; INTRAVENOUS PRN
Status: DISCONTINUED | OUTPATIENT
Start: 2024-08-30 | End: 2024-08-30 | Stop reason: SDUPTHER

## 2024-08-30 RX ORDER — GLUCAGON 1 MG
1 KIT INJECTION PRN
Status: CANCELLED | OUTPATIENT
Start: 2024-08-30

## 2024-08-30 RX ORDER — FENTANYL CITRATE 50 UG/ML
50 INJECTION, SOLUTION INTRAMUSCULAR; INTRAVENOUS EVERY 5 MIN PRN
Status: CANCELLED | OUTPATIENT
Start: 2024-08-30

## 2024-08-30 RX ORDER — BUPIVACAINE HYDROCHLORIDE 5 MG/ML
INJECTION, SOLUTION EPIDURAL; INTRACAUDAL PRN
Status: DISCONTINUED | OUTPATIENT
Start: 2024-08-30 | End: 2024-08-30 | Stop reason: HOSPADM

## 2024-08-30 RX ORDER — DIPHENHYDRAMINE HYDROCHLORIDE 50 MG/ML
12.5 INJECTION INTRAMUSCULAR; INTRAVENOUS
Status: CANCELLED | OUTPATIENT
Start: 2024-08-30 | End: 2024-08-31

## 2024-08-30 RX ORDER — SODIUM CHLORIDE 0.9 % (FLUSH) 0.9 %
5-40 SYRINGE (ML) INJECTION PRN
Status: CANCELLED | OUTPATIENT
Start: 2024-08-30

## 2024-08-30 RX ORDER — PROPOFOL 10 MG/ML
INJECTION, EMULSION INTRAVENOUS PRN
Status: DISCONTINUED | OUTPATIENT
Start: 2024-08-30 | End: 2024-08-30 | Stop reason: SDUPTHER

## 2024-08-30 RX ORDER — LIDOCAINE HYDROCHLORIDE 20 MG/ML
INJECTION, SOLUTION EPIDURAL; INFILTRATION; INTRACAUDAL; PERINEURAL PRN
Status: DISCONTINUED | OUTPATIENT
Start: 2024-08-30 | End: 2024-08-30 | Stop reason: SDUPTHER

## 2024-08-30 RX ORDER — DEXTROSE MONOHYDRATE 100 MG/ML
INJECTION, SOLUTION INTRAVENOUS CONTINUOUS PRN
Status: CANCELLED | OUTPATIENT
Start: 2024-08-30

## 2024-08-30 RX ORDER — FENTANYL CITRATE 50 UG/ML
INJECTION, SOLUTION INTRAMUSCULAR; INTRAVENOUS PRN
Status: DISCONTINUED | OUTPATIENT
Start: 2024-08-30 | End: 2024-08-30 | Stop reason: SDUPTHER

## 2024-08-30 RX ORDER — DEXMEDETOMIDINE HYDROCHLORIDE 100 UG/ML
INJECTION, SOLUTION INTRAVENOUS PRN
Status: DISCONTINUED | OUTPATIENT
Start: 2024-08-30 | End: 2024-08-30 | Stop reason: SDUPTHER

## 2024-08-30 RX ORDER — LIDOCAINE HYDROCHLORIDE 10 MG/ML
1 INJECTION, SOLUTION EPIDURAL; INFILTRATION; INTRACAUDAL; PERINEURAL
Status: DISCONTINUED | OUTPATIENT
Start: 2024-08-30 | End: 2024-08-30 | Stop reason: HOSPADM

## 2024-08-30 RX ORDER — MIDAZOLAM HYDROCHLORIDE 1 MG/ML
INJECTION INTRAMUSCULAR; INTRAVENOUS PRN
Status: DISCONTINUED | OUTPATIENT
Start: 2024-08-30 | End: 2024-08-30 | Stop reason: SDUPTHER

## 2024-08-30 RX ORDER — FAMOTIDINE 20 MG/1
20 TABLET, FILM COATED ORAL ONCE
Status: COMPLETED | OUTPATIENT
Start: 2024-08-30 | End: 2024-08-30

## 2024-08-30 RX ORDER — NALOXONE HYDROCHLORIDE 0.4 MG/ML
INJECTION, SOLUTION INTRAMUSCULAR; INTRAVENOUS; SUBCUTANEOUS PRN
Status: CANCELLED | OUTPATIENT
Start: 2024-08-30

## 2024-08-30 RX ORDER — ONDANSETRON 2 MG/ML
4 INJECTION INTRAMUSCULAR; INTRAVENOUS
Status: CANCELLED | OUTPATIENT
Start: 2024-08-30 | End: 2024-08-31

## 2024-08-30 RX ADMIN — PROPOFOL 200 MG: 10 INJECTION, EMULSION INTRAVENOUS at 12:45

## 2024-08-30 RX ADMIN — DEXMEDETOMIDINE HYDROCHLORIDE 6 MCG: 100 INJECTION, SOLUTION INTRAVENOUS at 12:58

## 2024-08-30 RX ADMIN — DEXMEDETOMIDINE HYDROCHLORIDE 4 MCG: 100 INJECTION, SOLUTION INTRAVENOUS at 13:11

## 2024-08-30 RX ADMIN — FENTANYL CITRATE 25 MCG: 50 INJECTION INTRAMUSCULAR; INTRAVENOUS at 12:57

## 2024-08-30 RX ADMIN — DEXMEDETOMIDINE HYDROCHLORIDE 4 MCG: 100 INJECTION, SOLUTION INTRAVENOUS at 13:02

## 2024-08-30 RX ADMIN — DEXMEDETOMIDINE HYDROCHLORIDE 6 MCG: 100 INJECTION, SOLUTION INTRAVENOUS at 13:06

## 2024-08-30 RX ADMIN — MIDAZOLAM 2 MG: 1 INJECTION, SOLUTION INTRAMUSCULAR; INTRAVENOUS at 12:41

## 2024-08-30 RX ADMIN — WATER 2000 MG: 1 INJECTION INTRAMUSCULAR; INTRAVENOUS; SUBCUTANEOUS at 12:52

## 2024-08-30 RX ADMIN — SODIUM CHLORIDE, POTASSIUM CHLORIDE, SODIUM LACTATE AND CALCIUM CHLORIDE: 600; 310; 30; 20 INJECTION, SOLUTION INTRAVENOUS at 10:30

## 2024-08-30 RX ADMIN — DEXMEDETOMIDINE HYDROCHLORIDE 4 MCG: 100 INJECTION, SOLUTION INTRAVENOUS at 13:16

## 2024-08-30 RX ADMIN — FENTANYL CITRATE 25 MCG: 50 INJECTION INTRAMUSCULAR; INTRAVENOUS at 12:49

## 2024-08-30 RX ADMIN — FENTANYL CITRATE 50 MCG: 50 INJECTION INTRAMUSCULAR; INTRAVENOUS at 12:43

## 2024-08-30 RX ADMIN — LIDOCAINE HYDROCHLORIDE 60 MG: 20 INJECTION, SOLUTION EPIDURAL; INFILTRATION; INTRACAUDAL; PERINEURAL at 12:45

## 2024-08-30 RX ADMIN — ONDANSETRON 4 MG: 2 INJECTION, SOLUTION INTRAMUSCULAR; INTRAVENOUS at 13:18

## 2024-08-30 RX ADMIN — FAMOTIDINE 20 MG: 20 TABLET ORAL at 10:32

## 2024-08-30 RX ADMIN — FENTANYL CITRATE 50 MCG: 50 INJECTION INTRAMUSCULAR; INTRAVENOUS at 13:08

## 2024-08-30 ASSESSMENT — PAIN DESCRIPTION - ORIENTATION: ORIENTATION: LEFT

## 2024-08-30 ASSESSMENT — PAIN SCALES - GENERAL: PAINLEVEL_OUTOF10: 5

## 2024-08-30 ASSESSMENT — PAIN DESCRIPTION - LOCATION: LOCATION: KNEE

## 2024-08-30 NOTE — H&P
Update History & Physical    The patient's History and Physical was reviewed with the patient and I examined the patient. There was no change. The surgical site was confirmed by the patient and me.       Plan: The risks, benefits, expected outcome, and alternative to the recommended procedure have been discussed with the patient. Patient understands and wants to proceed with the procedure.     Electronically signed by Kiko Segura MD on 8/30/2024 at 12:04 PM

## 2024-08-30 NOTE — OP NOTE
Operative Note      Patient: Davie Kay  YOB: 1965  MRN: [id number]    Date of Procedure: 8/30/2024    Pre-Op Diagnosis Codes:      * Complex tear of medial meniscus of right knee as current injury, initial encounter [S83.231A]     * Complex tear of lateral meniscus of right knee as current injury, initial encounter [S83.271A]    Post-Op Diagnosis: Same       Procedure(s):  RIGHT KNEE ARTHROSCOPIC PARTIAL MEDIAL AND LATERAL MENISCECTOMY    Surgeon(s):  Kiko Segura MD    Assistant:   Surgical Assistant: Jose Robertson    Anesthesia: General    Estimated Blood Loss (mL): Minimal    Complications: None    Specimens:   * No specimens in log *    Implants:  * No implants in log *      Drains: * No LDAs found *    Findings:  Infection Present At Time Of Surgery (PATOS) (choose all levels that have infection present):  No infection present  Other Findings: As above moderate degenerative changes of all 3 compartments    Detailed Description of Procedure:   Patient was taken to the operating room Discenza general trach anesthesia with anesthesia staff.  Placed standard arthroscopy iverson the right knee was prepped with ChloraPrep solution draped free sterile field.  Anterolateral portal was used send arthroscopy portal anteromedial portals were portal with portals made with 11 blade followed by blunt trocars.  Once the arthroscope was then placed the knee was systematically evaluated Sata suprapatellar pouch patellofemoral joint where diffuse degenerative changes were noted in both articular surface.  The medial compartment marked grade 3 and grade 4 changes in the most of the weightbearing surface with multiple cartilaginous debris that was debrided debrided with a shaver and a complex tear of the posterior half of the medial meniscus was debrided using straight basket forceps 3 5 shaver leaving a stable rim.  Anterior crucial ligament was intact there was some loose body present in the  notch was removed with pituitary and the lateral compartment global tear with horizontal cleavage tear throughout the lateral meniscus which was debrided unstable flaps with straight basket forceps 3 5 shaver leaving a stable rim.  Marked degenerative changes present in the weightbearing surface as well .  Fluid was suctioned out the knee was injected with Marcaine and the portals were closed with 4-0 Monocryl.  Sterile dressings were applied patient Toller procedure well was taken to recovery room without problems    Electronically signed by Kiko Segura MD on 8/30/2024 at 1:38 PM

## 2024-08-30 NOTE — BRIEF OP NOTE
Brief Postoperative Note      Patient: Davie Kay  YOB: 1965  MRN: [id number]    Date of Procedure: 8/30/2024    Pre-Op Diagnosis Codes:      * Complex tear of medial meniscus of right knee as current injury, initial encounter [S83.231A]     * Complex tear of lateral meniscus of right knee as current injury, initial encounter [S83.271A]    Post-Op Diagnosis: Same       Procedure(s):  RIGHT KNEE ARTHROSCOPIC PARTIAL MEDIAL AND LATERAL MENISCECTOMY    Surgeon(s):  Kiko Segura MD    Assistant:  Surgical Assistant: Jose Robertson    Anesthesia: General    Estimated Blood Loss (mL): Minimal    Complications: None    Specimens:   * No specimens in log *    Implants:  * No implants in log *      Drains: * No LDAs found *    Findings:  Infection Present At Time Of Surgery (PATOS) (choose all levels that have infection present):  No infection present  Other Findings: Marked degenerative changes in all 3 compartments    Electronically signed by Kiko Segura MD on 8/30/2024 at 1:38 PM

## 2024-08-30 NOTE — ANESTHESIA PRE PROCEDURE
Department of Anesthesiology  Preprocedure Note       Name:  Davie Kay   Age:  59 y.o.  :  1965                                          MRN:  [id number]         Date:  2024      Surgeon: Surgeon(s):  Kiko Segura MD    Procedure: Procedure(s):  RIGHT KNEE ARTHROSCOPIC PARTIAL MEDIAL AND LATERAL MENISCECTOMY    Medications prior to admission:   Prior to Admission medications    Medication Sig Start Date End Date Taking? Authorizing Provider   HYDROcodone-acetaminophen (NORCO) 7.5-325 MG per tablet Take 1 tablet by mouth every 4 hours as needed for Pain for up to 7 days. Take lowest dose possible to manage pain for acute post operative pain. Do not take until after surgery Max Daily Amount: 6 tablets 8/27/24 9/3/24 Yes Kiko Segura MD   Semaglutide, 1 MG/DOSE, (OZEMPIC) 4 MG/3ML SOPN sc injection Inject 1 mg into the skin every 7 days 4/3/24  Yes ProviderLeo MD   sildenafil (VIAGRA) 100 MG tablet Take 1 tablet by mouth as needed for Erectile Dysfunction 23  Yes Provider, MD Leo   carboxymethylcellulose (REFRESH PLUS) 0.5 % SOLN ophthalmic solution Place 1 drop into both eyes every 6 hours as needed   Yes ProviderLeo MD   meloxicam (MOBIC) 15 MG tablet 1 tab daily as needed with food. Do not mix with NSAIDS. 24  Yes Larry Dong MD   amLODIPine (NORVASC) 10 MG tablet Take 1 tablet by mouth daily   Yes Automatic Reconciliation, Ar   atorvastatin (LIPITOR) 10 MG tablet Take 1 tablet by mouth nightly   Yes Automatic Reconciliation, Ar   hydroCHLOROthiazide (HYDRODIURIL) 25 MG tablet Take 1 tablet by mouth daily   Yes Automatic Reconciliation, Ar   levothyroxine (SYNTHROID) 175 MCG tablet Take 1 tablet by mouth every morning (before breakfast)   Yes Automatic Reconciliation, Ar   losartan (COZAAR) 100 MG tablet Take 1 tablet by mouth daily   Yes Automatic Reconciliation, Ar   metFORMIN (GLUCOPHAGE) 1000 MG tablet Take 1 tablet by mouth daily  2005 last   • TOTAL KNEE ARTHROPLASTY Left 01/16/2023       Social History:    Social History     Tobacco Use   • Smoking status: Never   • Smokeless tobacco: Never   Substance Use Topics   • Alcohol use: Yes     Comment: Rare                                Counseling given: Not Answered      Vital Signs (Current):   Vitals:    08/26/24 1243 08/30/24 1019   BP:  (!) 145/88   Pulse:  93   Resp:  18   Temp:  97.8 °F (36.6 °C)   TempSrc:  Oral   SpO2:  99%   Weight: 109.8 kg (242 lb) 109.9 kg (242 lb 3.2 oz)   Height: 1.829 m (6')                                               BP Readings from Last 3 Encounters:   08/30/24 (!) 145/88   08/27/24 (!) 149/84   05/01/23 (!) 141/88       NPO Status: Time of last liquid consumption: 2000                        Time of last solid consumption: 2000                        Date of last liquid consumption: 08/30/24                        Date of last solid food consumption: 08/29/24    BMI:   Wt Readings from Last 3 Encounters:   08/30/24 109.9 kg (242 lb 3.2 oz)   05/28/24 110.7 kg (244 lb)   05/16/24 110.7 kg (244 lb)     Body mass index is 32.85 kg/m².    CBC:   Lab Results   Component Value Date/Time    WBC 7.2 09/14/2023 09:32 AM    RBC 4.10 09/14/2023 09:32 AM    HGB 12.9 09/14/2023 09:32 AM    HCT 38.7 09/14/2023 09:32 AM    MCV 94.4 09/14/2023 09:32 AM    RDW 12.8 09/14/2023 09:32 AM     09/14/2023 09:32 AM       CMP:   Lab Results   Component Value Date/Time     12/12/2022 07:55 AM    K 4.1 12/12/2022 07:55 AM     12/12/2022 07:55 AM    CO2 29 12/12/2022 07:55 AM    BUN 22 12/12/2022 07:55 AM    CREATININE 1.59 12/12/2022 07:55 AM    LABGLOM 50 12/12/2022 07:55 AM    GLUCOSE 116 12/12/2022 07:55 AM    CALCIUM 9.7 12/12/2022 07:55 AM       POC Tests:   Recent Labs     08/30/24  1014   POCGLU 122*       Coags:   Lab Results   Component Value Date/Time    PROTIME 13.1 12/12/2022 07:55 AM    INR 1.0 12/12/2022 07:55 AM    APTT 28.2 12/12/2022 07:55 AM

## 2024-08-30 NOTE — ANESTHESIA POSTPROCEDURE EVALUATION
Department of Anesthesiology  Postprocedure Note    Patient: Davie Kay  MRN: [id number]  YOB: 1965  Date of evaluation: 8/30/2024    Procedure Summary       Date: 08/30/24 Room / Location: [facility] MAIN 03 / [facility] MAIN OR    Anesthesia Start: 1241 Anesthesia Stop: 6277    Procedure: RIGHT KNEE ARTHROSCOPIC PARTIAL MEDIAL AND LATERAL MENISCECTOMY (Right: Knee) Diagnosis:       Complex tear of medial meniscus of right knee as current injury, initial encounter      Complex tear of lateral meniscus of right knee as current injury, initial encounter      (Complex tear of medial meniscus of right knee as current injury, initial encounter [S83.231A])      (Complex tear of lateral meniscus of right knee as current injury, initial encounter [S83.271A])    Surgeons: Kiko Segura MD Responsible Provider: Renato Hummel MD    Anesthesia Type: General ASA Status: 2            Anesthesia Type: General    Isis Phase I: Isis Score: 8    Isis Phase II: Isis Score: 10    Anesthesia Post Evaluation    Patient location during evaluation: PACU  Patient participation: complete - patient participated  Level of consciousness: sleepy but conscious  Pain score: 0  Airway patency: patent  Nausea & Vomiting: no nausea and no vomiting  Cardiovascular status: blood pressure returned to baseline  Respiratory status: acceptable  Hydration status: euvolemic  Pain management: adequate    No notable events documented.

## 2024-08-30 NOTE — PERIOP NOTE
Patient /Family /Designee has been informed that [facility] is not responsible for patient belongings per policy and the signed [facility] Patient Agreement document.  Personal items should be sent home or checked in with security.  Patient /Family /Designee selected the following action:                            [x]  Send personal items home with a family member or friend                                                 []  Check in personal items with security, excluding clothing                            []  Maintain personal items at the bedside, against recommendation                                 by José Miguel Ley [facility]                                   ** If patient /family /designee chooses to maintain personal items at the bedside,                                      Complete the patient belongings inventory in the EMR.

## 2024-08-30 NOTE — DISCHARGE INSTRUCTIONS
Dr. Dallas Knee Arthroscopy Surgery    What is the surgery?  This is an outpatient procedure at either [facility] or [facility]  You will be completely asleep for procedure. Dr. Dallas will make 2 small incisions in your knee. He will take a tour of your knee with the camera and then address the meniscal tear(s). We will be able to evaluate if any arthritis in your knee but this surgery is not to treat your arthritis.  Total surgery takes about 25-30 mins     What can you expect after surgery?  You will have a bulky dressing on your knee that you can remove 2 days after surgery. You will be able to shower 2 days after surgery but no soaking in a bath, hot tub, ocean or pool x 2 weeks to allow for full wound healing. No special brace is needed.  You will be on crutches or a walker when you leave the hospital. You can place weight on your leg as tolerated starting immediately. You are usually on crutches or your walker for about 4-5 days.   Even though you can place weight on your leg, we recommend no walking or standing longer than 10mins for the first week. We will gradually increase your activities after that point.    Dr. Dallas will start physical therapy for you when you return for your 1 week post op apt  It will take your 6-8 weeks to fully recover from your surgery.    When can I return to work?  Most patients return to desk work only after 1-2 weeks. We recommend no prolonged walking or standing, climbing, kneeling, or crawling x 6-8 weeks.     Not all knee arthroscopies are the same. The specifics of your individual case will be discussed at length with you by Dr. Dallas and his Physician Assistant.        Alka Arreola  Surgical Coordinator  [address] [place] [address]  [email or web address]  P: [phone]  F: [phone]     After general anesthesia or intravenous sedation, for 24 hours or while taking prescription Narcotics:  Limit your

## 2024-09-10 ENCOUNTER — OFFICE VISIT (OUTPATIENT)
Age: 59
End: 2024-09-10

## 2024-09-10 VITALS — HEIGHT: 72 IN | BODY MASS INDEX: 32.85 KG/M2

## 2024-09-10 DIAGNOSIS — S83.271A COMPLEX TEAR OF LATERAL MENISCUS OF RIGHT KNEE AS CURRENT INJURY, INITIAL ENCOUNTER: Primary | ICD-10-CM

## 2024-09-10 PROCEDURE — 99024 POSTOP FOLLOW-UP VISIT: CPT | Performed by: ORTHOPAEDIC SURGERY

## 2024-09-23 DIAGNOSIS — M17.11 PRIMARY OSTEOARTHRITIS OF RIGHT KNEE: ICD-10-CM

## 2024-09-23 RX ORDER — MELOXICAM 15 MG/1
TABLET ORAL
Qty: 30 TABLET | Refills: 3 | Status: SHIPPED | OUTPATIENT
Start: 2024-09-23

## 2024-10-08 ENCOUNTER — OFFICE VISIT (OUTPATIENT)
Age: 59
End: 2024-10-08

## 2024-10-08 VITALS — WEIGHT: 242 LBS | BODY MASS INDEX: 32.78 KG/M2 | HEIGHT: 72 IN

## 2024-10-08 DIAGNOSIS — M17.11 PRIMARY OSTEOARTHRITIS OF RIGHT KNEE: Primary | ICD-10-CM

## 2024-10-08 DIAGNOSIS — S83.231D COMPLEX TEAR OF MEDIAL MENISCUS OF RIGHT KNEE AS CURRENT INJURY, SUBSEQUENT ENCOUNTER: ICD-10-CM

## 2024-10-08 DIAGNOSIS — S83.271D COMPLEX TEAR OF LATERAL MENISCUS OF RIGHT KNEE AS CURRENT INJURY, SUBSEQUENT ENCOUNTER: ICD-10-CM

## 2024-10-08 PROCEDURE — 99024 POSTOP FOLLOW-UP VISIT: CPT | Performed by: NURSE PRACTITIONER

## 2024-10-08 NOTE — PROGRESS NOTES
Davie Kay  1965   Chief Complaint   Patient presents with    Post-Op Check     Rt knee        HISTORY OF PRESENT ILLNESS  Davie Kay is a 59 y.o. male who presents today for reevaluation of s/p right knee arthroscopic partial medial and lateral on 08/30/2024. Pain is a 1/10. He still has popping and clicking. He has minimal swelling. He has not started PT. He is interested in proceeding with knee replacement. He has had a left knee replacement with good results with Dr. Dong.    Patient denies any fever, chills, chest pain, shortness of breath or calf pain. The remainder of the review of systems is negative. There are no new illness or injuries other than that mentioned above to report since last seen in the office. No changes in medications, allergies, social or family history.      PHYSICAL EXAM:   Ht 1.829 m (6')   Wt 109.8 kg (242 lb)   BMI 32.82 kg/m²   The patient is a well-developed, well-nourished male   in no acute distress.  The patient is alert and oriented times three.  The patient is alert and oriented times three. Mood and affect are normal.  LYMPHATIC: lymph nodes are not enlarged and are within normal limits  SKIN: normal in color and non tender to palpation. There are no bruises or abrasions noted.   NEUROLOGICAL: Motor sensory exam is within normal limits. Reflexes are equal bilaterally. There is normal sensation to pinprick and light touch  MUSCULOSKELETAL: Wounds are clean and dry minimal swelling range of motion 0 to 110 degrees         PROCEDURE: none    IMAGING: MRI of the right knee obtained by [facility] dated 5/10/24 reviewed and read by Dr. Dallas:   Horizontal tear in the posterior horn and body of the lateral meniscus. Free edge tear in the posterior horn of the medial meniscus. Mild to moderate cartilage disease in the knee which is most pronounced in the medial femorotibial compartment. Knee effusion.    IMPRESSION:      ICD-10-CM    1. Primary osteoarthritis of

## 2024-10-15 ENCOUNTER — HOSPITAL ENCOUNTER (OUTPATIENT)
Facility: HOSPITAL | Age: 59
Setting detail: RECURRING SERIES
Discharge: HOME | End: 2024-10-18
Payer: OTHER GOVERNMENT

## 2024-10-15 PROCEDURE — 97110 THERAPEUTIC EXERCISES: CPT

## 2024-10-15 PROCEDURE — 97535 SELF CARE MNGMENT TRAINING: CPT

## 2024-10-15 PROCEDURE — 97161 PT EVAL LOW COMPLEX 20 MIN: CPT

## 2024-10-15 NOTE — PROGRESS NOTES
[facility]  [address] [place] [address] Phone: [phone] Fax   Plan of Care / Statement of Necessity for Physical Therapy Services     Patient Name: Davie Kay : 1965   Medical   Diagnosis: Right knee pain [M25.561]  Complex tear of lateral meniscus, current injury, right knee, initial encounter [S83.733A] Treatment Diagnosis: M25.561  RIGHT KNEE PAIN    Onset Date: DOS 24 Payor: Payor: [employer] / Plan: [employer] / Product Type: *No Product type* /    Referral Source: Kiko Segura,* Start of Care (SOC): 10/15/2024   Prior Hospitalization: See medical history Provider #: [id number]   Prior Level of Function: Retired, chronic left knee pain (Left TKA)   Comorbidities: Diabetes mellitus and Other: HTN, thyroid dysfunction, LBP   Medications: Verified on Patient Summary List     Assessment / key information:  Patient is a 59 y.o. male presenting s/p right medial/lateral meniscectomy DOS 24. Patient currently ambulates without AD, but reports use of Rolator when ambulating within the community. He reports a standing tolerance of ~10', and is reliant on his chair lift to ascend/descend his stairs at home. He currently demonstrates the following:       AROM                    PROM                           Left Right Left Right   Knee Flexion 110 90 111 93     Extension -10 -7 -7 -5   Patient reports that he intends to seek TKA after his vacation to the [facility] in December. Pt  will benefit from physical therapist management to address his impairments (listed below),  educate him, and improve his level of function. Thanks for your referral.    Evaluation Complexity:  History:  HIGH Complexity :3+ comorbidities / personal factors will impact the outcome/ POC ; Examination:  LOW Complexity : 1-2 Standardized tests and measures addressing body structure, function, activity limitation and / or 
 Patellar ligament    Patella  Fibular head  Pes Anserinus    Tibial tubercle  Hamstring tendons  Infrapatellar fat pad    Other tests/comments:    22 min [x]Eval  - untimed                Therapeutic Procedures:  Tx Min Billable or 1:1 Min (if diff from Tx Min) Procedure, Rationale, Specifics   10  67724 Therapeutic Exercise (timed):  increase ROM, strength, coordination, balance, and proprioception to improve patient's ability to progress to PLOF and address remaining functional goals. (see flow sheet as applicable)     Details if applicable:     8  17752 Self Care/Home Management (timed):  improve patient knowledge and understanding of pain reducing techniques, positioning, posture/ergonomics, home safety, activity modification, diagnosis/prognosis, and physical therapy expectations, procedures and progression  to improve patient's ability to progress to PLOF and address remaining functional goals.  (see flow sheet as applicable)     Details if applicable:     Total  18 Total Reminder: MC/BC bill using total billable min of TIMED therapeutic procedures (example: do not include dry needle or estim unattended, both untimed codes, in totals to left)      ASSESSMENT/PLAN    Patient will continue to benefit from skilled PT services to modify and progress therapeutic interventions, analyze and address functional mobility deficits, analyze and address ROM deficits, analyze and address strength deficits, analyze and address soft tissue restrictions, analyze and cue for proper movement patterns, analyze and modify for postural abnormalities, analyze and address imbalance/dizziness, and instruct in home and community integration to address functional deficits and attain remaining goals.    [x]  See Plan of Care - for goals and assessment     Quang \"BJ\" Denise DPT, Cert. MDT, Cert. DN, Cert. SMT, Dip. Osteopractic   10/15/2024  4:20 PM    Justification for Eval Code Complexity:  Patient History : HIGH  Examination see exam

## 2024-10-17 ENCOUNTER — HOSPITAL ENCOUNTER (OUTPATIENT)
Facility: HOSPITAL | Age: 59
Setting detail: RECURRING SERIES
Discharge: HOME | End: 2024-10-20
Payer: OTHER GOVERNMENT

## 2024-10-17 PROCEDURE — 97530 THERAPEUTIC ACTIVITIES: CPT

## 2024-10-17 PROCEDURE — 97110 THERAPEUTIC EXERCISES: CPT

## 2024-10-17 NOTE — PROGRESS NOTES
PHYSICAL / OCCUPATIONAL THERAPY - DAILY TREATMENT NOTE (updated )    Patient Name: Davie Kay    Date: 10/17/2024    : 1965  Insurance: Payor: [employer] / Plan: [employer] / Product Type: *No Product type* /      Patient  verified yes     Visit #   Current / Total 2 8-24   Time   In / Out 755 835   Pain   In / Out 5 5   Subjective Functional Status/Changes: It's always stiff when it's cold out     TREATMENT AREA =  Right knee pain [M25.561]  Complex tear of lateral meniscus, current injury, right knee, initial encounter [S83.271A]    Next PN/ RC due 24  Auth due 15V; 10/28/24    OBJECTIVE    Therapeutic Procedures:  Tx Min Procedure, Rationale, Specifics   25 19645 Therapeutic Exercise (timed):  increase ROM, strength, coordination, balance, and proprioception to improve patient's ability to progress to PLOF and address remaining functional goals. (see flow sheet as applicable)     Details if applicable:       15 03977 Therapeutic Activity (timed):  use of dynamic activities replicating functional movements to increase ROM, strength, coordination, balance, and proprioception in order to improve patient's ability to progress to PLOF and address remaining functional goals.  (see flow sheet as applicable)     Details if applicable:     Total  40       [x]  Patient Education billed concurrently with other procedures   [x] Review HEP    [] Progressed/Changed HEP  [] Other:    Objective Information/Functional Measures/Assessment    Initiated treatment protocol per POC.      Patient will continue to benefit from skilled PT services to modify and progress therapeutic interventions, analyze and address functional mobility deficits, analyze and address ROM deficits, analyze and address strength deficits, analyze and address soft tissue restrictions, analyze and cue for proper movement patterns, and instruct in home and community integration to address functional deficits and attain remaining

## 2024-10-22 ENCOUNTER — HOSPITAL ENCOUNTER (OUTPATIENT)
Facility: HOSPITAL | Age: 59
Setting detail: RECURRING SERIES
Discharge: HOME | End: 2024-10-25
Payer: OTHER GOVERNMENT

## 2024-10-22 PROCEDURE — 97535 SELF CARE MNGMENT TRAINING: CPT

## 2024-10-22 PROCEDURE — 97112 NEUROMUSCULAR REEDUCATION: CPT

## 2024-10-22 PROCEDURE — 97110 THERAPEUTIC EXERCISES: CPT

## 2024-10-22 PROCEDURE — 97530 THERAPEUTIC ACTIVITIES: CPT

## 2024-10-22 NOTE — PROGRESS NOTES
PHYSICAL  DAILY TREATMENT NOTE     Patient Name: Davie Kay    Date: 10/22/2024    : 1965  Insurance: Payor: [employer] / Plan: [employer] / Product Type: *No Product type* /      Patient  verified Yes     Visit #   Current / Total 3 8   Time   In / Out 726 807   Pain   In / Out 0/10 0/10   Subjective Functional Status/Changes: \"Its stiff and some p! From the cold. I dont have p! In the Right knee, more in the L. It has not been the same since the knee replacement\".       Next PN/ RC due 11/15/24  Auth due 10/28/24  15 V    TREATMENT AREA =  Right knee pain [M25.561]  Complex tear of lateral meniscus, current injury, right knee, initial encounter [S83.271A]    OBJECTIVE      Therapeutic Procedures:    Tx Min Billable or 1:1 Min (if diff from Tx Min) Procedure, Rationale, Specifics   13  92029 Therapeutic Exercise (timed):  increase ROM, strength, coordination, balance, and proprioception to improve patient's ability to progress to PLOF and address remaining functional goals. (see flow sheet as applicable)     Details if applicable:       10  99618 Neuromuscular Re-Education (timed):  improve balance, coordination, kinesthetic sense, posture, core stability and proprioception to improve patient's ability to develop conscious control of individual muscles and awareness of position of extremities in order to progress to PLOF and address remaining functional goals. (see flow sheet as applicable)     Details if applicable:     10  46384 Therapeutic Activity (timed):  use of dynamic activities replicating functional movements to increase ROM, strength, coordination, balance, and proprioception in order to improve patient's ability to progress to PLOF and address remaining functional goals.  (see flow sheet as applicable)     Details if applicable:     8  71323 Self Care/Home Management (timed):  improve patient knowledge and understanding of pain reducing techniques, positioning, posture/ergonomics, home

## 2024-10-29 ENCOUNTER — HOSPITAL ENCOUNTER (OUTPATIENT)
Facility: HOSPITAL | Age: 59
Setting detail: RECURRING SERIES
Discharge: HOME | End: 2024-11-01
Payer: OTHER GOVERNMENT

## 2024-10-29 PROCEDURE — 97110 THERAPEUTIC EXERCISES: CPT

## 2024-10-29 NOTE — PROGRESS NOTES
PHYSICAL / OCCUPATIONAL THERAPY - DAILY TREATMENT NOTE (updated )    Patient Name: Davie Kay    Date: 10/29/2024    : 1965  Insurance: Payor: [employer] / Plan: [employer] / Product Type: *No Product type* /      Patient  verified YES    Visit #   Current / Total 4 8   Time   In / Out 1003 1048   Pain   In / Out 0 0   Subjective Functional Status/Changes: Denies any difficulty after last session       TREATMENT AREA =  Right knee pain [M25.561]  Complex tear of lateral meniscus, current injury, right knee, initial encounter [S83.271A]    Next PN/ RC due 11/15/24  Auth due 10/28/24   15 V    OBJECTIVE      Therapeutic Procedures:  Tx Min Billable or 1:1 Min (if diff from Tx Min) Procedure, Rationale, Specifics   45  27391 Therapeutic Exercise (timed):  increase ROM, strength, coordination, balance, and proprioception to improve patient's ability to progress to PLOF and address remaining functional goals. (see flow sheet as applicable)     Details if applicable:    See flowsheet     45  [facility] Totals Reminder: bill using total billable min of TIMED therapeutic procedures (example: do not include dry needle or estim unattended, both untimed codes, in totals to left)  8-22 min = 1 unit; 23-37 min = 2 units; 38-52 min = 3 units; 53-67 min = 4 units; 68-82 min = 5 units   Total Total     [x]  Patient Education billed concurrently with other procedures   [x] Review HEP    [] Progressed/Changed HEP, detail:    [] Other detail:       Objective Information/Functional Measures/Assessment    Pt tolerated session well and demonstrated no apprehension during session. Required cuing throughout session. Added SLR 3 way and prone quad stretch. Hip abduction weakness observed during SL hip abduction.     Patient will continue to benefit from skilled PT / OT services to modify and progress therapeutic interventions, analyze and address functional mobility deficits, analyze and address ROM deficits, analyze and

## 2024-10-31 ENCOUNTER — APPOINTMENT (OUTPATIENT)
Facility: HOSPITAL | Age: 59
End: 2024-10-31
Payer: OTHER GOVERNMENT

## 2024-11-14 ENCOUNTER — OFFICE VISIT (OUTPATIENT)
Age: 59
End: 2024-11-14

## 2024-11-14 VITALS — WEIGHT: 242 LBS | BODY MASS INDEX: 32.78 KG/M2 | HEIGHT: 72 IN

## 2024-11-14 DIAGNOSIS — M17.11 PRIMARY OSTEOARTHRITIS OF RIGHT KNEE: Primary | ICD-10-CM

## 2024-11-14 RX ORDER — BETAMETHASONE SODIUM PHOSPHATE AND BETAMETHASONE ACETATE 3; 3 MG/ML; MG/ML
6 INJECTION, SUSPENSION INTRA-ARTICULAR; INTRALESIONAL; INTRAMUSCULAR; SOFT TISSUE ONCE
Status: COMPLETED | OUTPATIENT
Start: 2024-11-14 | End: 2024-11-14

## 2024-11-14 RX ADMIN — BETAMETHASONE SODIUM PHOSPHATE AND BETAMETHASONE ACETATE 6 MG: 3; 3 INJECTION, SUSPENSION INTRA-ARTICULAR; INTRALESIONAL; INTRAMUSCULAR; SOFT TISSUE at 14:36

## 2024-11-14 NOTE — PROGRESS NOTES
Patient: Davie Kay                MRN: [id number]       SSN: [id number]  YOB: 1965        AGE: 59 y.o.        SEX: male  Body mass index is 32.82 kg/m².    PCP: Sadi Burks MD  11/14/24      Davie is here today for follow-up assessment of knees as member he had a left knee replacement had bit of a slow start arthrofibrosis he is work diligently and I think will do better with his next knee replacement he had a scope by Dr. Dallas in August and he is got grade 4 changes in the knee I would recommend a cortisone injection for him today he cannot have NSAIDs because of some kidney issues.    I did examine him today he is in varus on the right side incisions nicely healed he bends to 110 degrees on the right and comes out within about 3 degrees full extension quads are intact Homans' sign is negative hips rotate well I did review his x-rays with him he is gone on to develop severe arthritis of the right knee confirmed with knee scope right knee injected as per protocol return to see us just into the new year we will plan on getting surgery done for him in the springtime for him and social determinants of health were again reviewed no negative factors affecting patient care thank you    X-rays November 14, 2024 4 views of the right knee advanced arthritis with 1-1/2 mm of cartilage medially and extensive patellofemoral changes severe knee replacement on the left side incompletely visualized but in excellent position and alignment      [facility]  OFFICE PROCEDURE PROGRESS NOTE      Chart reviewed for the following:  Larry OJEDA M.D., have reviewed the History, Physical and updated the Allergic reactions for Davie Kay?    TIME OUT performed immediately prior to start of procedure:  Larry OJEDA M.D., have performed the following reviews on Davie Kay prior to the start of the procedure:  ????????  * Patient was identified by name

## 2025-02-03 DIAGNOSIS — M17.11 PRIMARY OSTEOARTHRITIS OF RIGHT KNEE: ICD-10-CM

## 2025-02-03 RX ORDER — MELOXICAM 15 MG/1
TABLET ORAL
Qty: 30 TABLET | Refills: 3 | Status: SHIPPED | OUTPATIENT
Start: 2025-02-03

## 2025-02-07 ENCOUNTER — TELEPHONE (OUTPATIENT)
Age: 60
End: 2025-02-07

## 2025-02-07 DIAGNOSIS — M17.11 PRIMARY OSTEOARTHRITIS OF RIGHT KNEE: Primary | ICD-10-CM

## 2025-02-07 NOTE — TELEPHONE ENCOUNTER
Patient called Nnamdi Xavier asking about his surgery date for rt knee. He saw Dr. Dong on 11/14/24 and discussed surgery but no order was given to Nnamdi.  Will need CHIDI Hughes to let us know what SX, reps and equipment needed. Once order is in Nnamdi will call patient back to schedule.

## 2025-05-12 DIAGNOSIS — Z01.818 PRE-OP TESTING: Primary | ICD-10-CM

## 2025-05-12 DIAGNOSIS — M17.11 PRIMARY OSTEOARTHRITIS OF RIGHT KNEE: ICD-10-CM

## 2025-05-27 ENCOUNTER — PREP FOR PROCEDURE (OUTPATIENT)
Age: 60
End: 2025-05-27

## 2025-05-27 PROBLEM — M17.11 OSTEOARTHRITIS OF RIGHT KNEE: Status: ACTIVE | Noted: 2025-05-27

## 2025-06-02 ENCOUNTER — HOSPITAL ENCOUNTER (OUTPATIENT)
Facility: HOSPITAL | Age: 60
Discharge: HOME | End: 2025-06-05
Payer: OTHER GOVERNMENT

## 2025-06-02 DIAGNOSIS — M17.11 PRIMARY OSTEOARTHRITIS OF RIGHT KNEE: ICD-10-CM

## 2025-06-02 DIAGNOSIS — Z01.818 PRE-OP TESTING: ICD-10-CM

## 2025-06-02 LAB
ALBUMIN SERPL-MCNC: 3.9 G/DL (ref 3.4–5)
ANION GAP SERPL CALC-SCNC: 11 MMOL/L (ref 3–18)
APPEARANCE UR: ABNORMAL
APTT PPP: 28.9 SEC (ref 23–36.4)
BACTERIA URNS QL MICRO: ABNORMAL /HPF
BASOPHILS # BLD: 0.03 K/UL (ref 0–0.1)
BASOPHILS NFR BLD: 0.4 % (ref 0–2)
BILIRUB UR QL: NEGATIVE
BUN SERPL-MCNC: 15 MG/DL (ref 6–23)
BUN/CREAT SERPL: 9 (ref 12–20)
CALCIUM SERPL-MCNC: 9.9 MG/DL (ref 8.5–10.1)
CHLORIDE SERPL-SCNC: 105 MMOL/L (ref 98–107)
CO2 SERPL-SCNC: 26 MMOL/L (ref 21–32)
COLOR UR: YELLOW
CREAT SERPL-MCNC: 1.68 MG/DL (ref 0.6–1.3)
DIFFERENTIAL METHOD BLD: ABNORMAL
EKG ATRIAL RATE: 77 BPM
EKG DIAGNOSIS: NORMAL
EKG P AXIS: 54 DEGREES
EKG P-R INTERVAL: 174 MS
EKG Q-T INTERVAL: 376 MS
EKG QRS DURATION: 102 MS
EKG QTC CALCULATION (BAZETT): 425 MS
EKG R AXIS: -32 DEGREES
EKG T AXIS: 83 DEGREES
EKG VENTRICULAR RATE: 77 BPM
EOSINOPHIL # BLD: 0.11 K/UL (ref 0–0.4)
EOSINOPHIL NFR BLD: 1.4 % (ref 0–5)
EPITH CASTS URNS QL MICRO: ABNORMAL /LPF (ref 0–5)
ERYTHROCYTE [DISTWIDTH] IN BLOOD BY AUTOMATED COUNT: 13 % (ref 11.6–14.5)
EST. AVERAGE GLUCOSE BLD GHB EST-MCNC: 139 MG/DL
GLUCOSE SERPL-MCNC: 104 MG/DL (ref 74–108)
GLUCOSE UR STRIP.AUTO-MCNC: >1000 MG/DL
HBA1C MFR BLD: 6.5 % (ref 4.2–5.6)
HCT VFR BLD AUTO: 39.2 % (ref 36–48)
HGB BLD-MCNC: 13.1 G/DL (ref 13–16)
HGB UR QL STRIP: ABNORMAL
IMM GRANULOCYTES # BLD AUTO: 0.01 K/UL (ref 0–0.04)
IMM GRANULOCYTES NFR BLD AUTO: 0.1 % (ref 0–0.5)
INR PPP: 0.9 (ref 0.9–1.1)
KETONES UR QL STRIP.AUTO: ABNORMAL MG/DL
LEUKOCYTE ESTERASE UR QL STRIP.AUTO: NEGATIVE
LYMPHOCYTES # BLD: 2.55 K/UL (ref 0.9–3.6)
LYMPHOCYTES NFR BLD: 32.4 % (ref 21–52)
MCH RBC QN AUTO: 32 PG (ref 24–34)
MCHC RBC AUTO-ENTMCNC: 33.4 G/DL (ref 31–37)
MCV RBC AUTO: 95.6 FL (ref 78–100)
MONOCYTES # BLD: 0.67 K/UL (ref 0.05–1.2)
MONOCYTES NFR BLD: 8.5 % (ref 3–10)
NEUTS SEG # BLD: 4.51 K/UL (ref 1.8–8)
NEUTS SEG NFR BLD: 57.2 % (ref 40–73)
NITRITE UR QL STRIP.AUTO: NEGATIVE
NRBC # BLD: 0 K/UL (ref 0–0.01)
NRBC BLD-RTO: 0 PER 100 WBC
PH UR STRIP: 5.5 (ref 5–8)
PLATELET # BLD AUTO: 222 K/UL (ref 135–420)
PMV BLD AUTO: 10.9 FL (ref 9.2–11.8)
POTASSIUM SERPL-SCNC: 3.8 MMOL/L (ref 3.5–5.5)
PROT UR STRIP-MCNC: 30 MG/DL
PROTHROMBIN TIME: 12.5 SEC (ref 11.9–14.9)
RBC # BLD AUTO: 4.1 M/UL (ref 4.35–5.65)
RBC #/AREA URNS HPF: ABNORMAL /HPF (ref 0–5)
SODIUM SERPL-SCNC: 141 MMOL/L (ref 136–145)
SP GR UR REFRACTOMETRY: >1.03 (ref 1–1.04)
UROBILINOGEN UR QL STRIP.AUTO: 0.2 EU/DL (ref 0.2–1)
WBC # BLD AUTO: 7.9 K/UL (ref 4.6–13.2)
WBC URNS QL MICRO: ABNORMAL /HPF (ref 0–5)

## 2025-06-02 PROCEDURE — 93010 ELECTROCARDIOGRAM REPORT: CPT | Performed by: INTERNAL MEDICINE

## 2025-06-02 PROCEDURE — 82040 ASSAY OF SERUM ALBUMIN: CPT

## 2025-06-02 PROCEDURE — 93005 ELECTROCARDIOGRAM TRACING: CPT

## 2025-06-02 PROCEDURE — 85025 COMPLETE CBC W/AUTO DIFF WBC: CPT

## 2025-06-02 PROCEDURE — 81001 URINALYSIS AUTO W/SCOPE: CPT

## 2025-06-02 PROCEDURE — 83036 HEMOGLOBIN GLYCOSYLATED A1C: CPT

## 2025-06-02 PROCEDURE — 85730 THROMBOPLASTIN TIME PARTIAL: CPT

## 2025-06-02 PROCEDURE — 87086 URINE CULTURE/COLONY COUNT: CPT

## 2025-06-02 PROCEDURE — 85610 PROTHROMBIN TIME: CPT

## 2025-06-02 PROCEDURE — 71046 X-RAY EXAM CHEST 2 VIEWS: CPT

## 2025-06-02 PROCEDURE — 36415 COLL VENOUS BLD VENIPUNCTURE: CPT

## 2025-06-02 PROCEDURE — 80048 BASIC METABOLIC PNL TOTAL CA: CPT

## 2025-06-03 LAB
BACTERIA SPEC CULT: NORMAL
SERVICE CMNT-IMP: NORMAL

## 2025-06-11 ENCOUNTER — OFFICE VISIT (OUTPATIENT)
Age: 60
End: 2025-06-11

## 2025-06-11 VITALS
SYSTOLIC BLOOD PRESSURE: 151 MMHG | BODY MASS INDEX: 33.05 KG/M2 | HEIGHT: 72 IN | WEIGHT: 244 LBS | DIASTOLIC BLOOD PRESSURE: 96 MMHG

## 2025-06-11 DIAGNOSIS — M17.11 PRIMARY OSTEOARTHRITIS OF RIGHT KNEE: Primary | ICD-10-CM

## 2025-06-11 RX ORDER — TAMSULOSIN HYDROCHLORIDE 0.4 MG/1
0.4 CAPSULE ORAL
OUTPATIENT
Start: 2025-06-11 | End: 2025-06-11

## 2025-06-11 RX ORDER — CELECOXIB 100 MG/1
200 CAPSULE ORAL
OUTPATIENT
Start: 2025-06-11 | End: 2025-06-11

## 2025-06-11 RX ORDER — ACETAMINOPHEN 325 MG/1
1000 TABLET ORAL
OUTPATIENT
Start: 2025-06-11 | End: 2025-06-11

## 2025-06-11 NOTE — H&P (VIEW-ONLY)
HISTORY & PHYSICAL      Patient: Davie Kay                MRN: [id number]       SSN: [id number]  YOB: 1965        AGE: 59 y.o.        SEX: male  Body mass index is 33.09 kg/m².    PCP: Sadi Burks MD  06/11/25      CC: right knee end stage OA  Problem List Items Addressed This Visit          Musculoskeletal and Integument    Osteoarthritis of right knee - Primary    Relevant Orders    [86045] Knee 4V         HPI:  The patient is a pleasant 59 y.o. whom has end stage OA of their right knee and has failed conservative treatment including but not limited to NSAIDS, cortisone injections, viscosupplementation, PT, and pain medicine.  Due to the current findings and affected activities of daily living, surgical intervention is indicated.  The alternatives, risks, complications, as well as expected outcome were discussed.  These include but are not limited to infection, blood loss, need for blood transfusion, neurovascular damage, DVT, PE,  post-op stiffness and pain, leg length discrepancy, dislocation, anesthetic complications, prothesis longevity, need for more surgery, MI, stroke, and even death.  The patient understands and wishes to proceed with surgery.      Past Medical History:   Diagnosis Date    Chronic back pain     Diabetes (HCC)     Hypertension     Hypothyroid     Takes levothyroxine    JORDON on CPAP          Current Outpatient Medications:     meloxicam (MOBIC) 15 MG tablet, take 1 tablet by mouth daily if needed with food DO NOT MIX WITH NSAIDS, Disp: 30 tablet, Rfl: 3    Semaglutide, 1 MG/DOSE, (OZEMPIC) 4 MG/3ML SOPN sc injection, Inject 1 mg into the skin every 7 days, Disp: , Rfl:     sildenafil (VIAGRA) 100 MG tablet, Take 1 tablet by mouth as needed for Erectile Dysfunction, Disp: , Rfl:     carboxymethylcellulose (REFRESH PLUS) 0.5 % SOLN ophthalmic solution, Place 1 drop into both eyes every 6 hours as needed, Disp: , Rfl:     amLODIPine (NORVASC) 10 MG tablet,

## 2025-06-11 NOTE — PATIENT INSTRUCTIONS
Patient: Davie Kay                MRN: [id number]       SSN:   YOB: 1965        AGE: 59 y.o.        SEX: male  Body mass index is 33.09 kg/m².    06/11/25    DO:  1:  Sit with the leg out straight 5-10 min every hour while awake.  Keep the toes pointed       up towards the rena.         2.  Bend your knee 5-10 min every hour.  Bend it to the point of pain and hold it for 5-10       Min.        3.  ICE your knee 20 min every hour    4.  You can expect to have swelling and discomfort in your thigh down to your knee.  Swelling may extend to your foot.  You may have bruising from the thigh to the foot as well.  Some numbness can be expected to the outside part of the knee.  Wear the compression stockings and elevate your leg.      DO NOT:    1.  Do not place anything under your knee to prop it up  2.  Do not sit in the recliner chair.

## 2025-06-13 RX ORDER — ATORVASTATIN CALCIUM 20 MG/1
20 TABLET, FILM COATED ORAL DAILY
COMMUNITY

## 2025-06-13 RX ORDER — LEVOTHYROXINE SODIUM 88 UG/1
88 TABLET ORAL DAILY
COMMUNITY

## 2025-06-13 RX ORDER — TRAMADOL HYDROCHLORIDE 50 MG/1
50 TABLET ORAL EVERY 6 HOURS PRN
Status: ON HOLD | COMMUNITY
End: 2025-06-23 | Stop reason: HOSPADM

## 2025-06-13 NOTE — PERIOP NOTE
Instructions for your surgery at [facility]      Today's Date:  6/13/2025      Patient's Name:  Davie Kay           Surgery Date:  06/23/2025              Please enter the main entrance of the hospital and check-in at the front security desk located in the lobby. They will direct you to the area to report for your surgery.     Do NOT eat or drink anything, including candy, gum, or ice chips after midnight prior to your surgery, unless you have specific instructions from your surgeon or anesthesia provider to do so.  Brush your teeth before coming to the hospital. You may swish with water, but do not swallow.  No smoking/Vaping/E-Cigarettes 24 hours prior to the day of surgery.  No alcohol 24 hours prior to the day of surgery.  No recreational drugs for one week prior to the day of surgery.  Bring Photo ID, Insurance information, and Co-pay if required on day of surgery.  Bring in pertinent legal documents, such as, Medical Power of , DNR, Advance Directive, etc.  Leave all valuables, including money/purse, weapons at home.  Remove all jewelry, including ALL body piercings, nail polish, acrylic nails, and makeup (including mascara); no lotions, powders, deodorant, or perfume/cologne/after shave on the skin.  Follow instruction for Hibiclens washes and CHG wipes from surgeon's office.   Glasses and dentures may be worn to the hospital. They must be removed prior to surgery. Please bring case/container for glasses or dentures.   Contact lenses should not be worn on day of surgery.   Call your doctor's office if symptoms of a cold or illness develop within 24-48 hours prior to your surgery.  Call your doctor's office if you have any questions concerning insurance or co-pays.  15. AN ADULT (relative or friend 18 years or older) MUST DRIVE YOU HOME AFTER YOUR SURGERY.  16. Please make arrangements for a responsible adult (18 years or older) to be with you for 24 hours after your

## 2025-06-22 ENCOUNTER — ANESTHESIA EVENT (OUTPATIENT)
Facility: HOSPITAL | Age: 60
End: 2025-06-22
Payer: OTHER GOVERNMENT

## 2025-06-23 ENCOUNTER — ANESTHESIA (OUTPATIENT)
Facility: HOSPITAL | Age: 60
End: 2025-06-23
Payer: OTHER GOVERNMENT

## 2025-06-23 ENCOUNTER — APPOINTMENT (OUTPATIENT)
Facility: HOSPITAL | Age: 60
End: 2025-06-23
Attending: ORTHOPAEDIC SURGERY
Payer: OTHER GOVERNMENT

## 2025-06-23 ENCOUNTER — HOSPITAL ENCOUNTER (OUTPATIENT)
Facility: HOSPITAL | Age: 60
Discharge: HOME | End: 2025-06-23
Attending: ORTHOPAEDIC SURGERY | Admitting: ORTHOPAEDIC SURGERY
Payer: OTHER GOVERNMENT

## 2025-06-23 VITALS
RESPIRATION RATE: 17 BRPM | DIASTOLIC BLOOD PRESSURE: 84 MMHG | HEART RATE: 69 BPM | HEIGHT: 72 IN | WEIGHT: 239 LBS | OXYGEN SATURATION: 98 % | BODY MASS INDEX: 32.37 KG/M2 | SYSTOLIC BLOOD PRESSURE: 126 MMHG | TEMPERATURE: 97.5 F

## 2025-06-23 DIAGNOSIS — M17.11 PRIMARY OSTEOARTHRITIS OF RIGHT KNEE: Primary | ICD-10-CM

## 2025-06-23 PROBLEM — M22.8X1 MALTRACKING OF RIGHT PATELLA: Status: ACTIVE | Noted: 2025-06-23

## 2025-06-23 PROBLEM — M21.161 GENU VARUM OF RIGHT LOWER EXTREMITY: Status: ACTIVE | Noted: 2025-06-23

## 2025-06-23 LAB
GLUCOSE BLD STRIP.AUTO-MCNC: 103 MG/DL (ref 70–110)
GLUCOSE BLD STRIP.AUTO-MCNC: 112 MG/DL (ref 70–110)

## 2025-06-23 PROCEDURE — 2580000003 HC RX 258: Performed by: NURSE ANESTHETIST, CERTIFIED REGISTERED

## 2025-06-23 PROCEDURE — 2500000003 HC RX 250 WO HCPCS: Performed by: ORTHOPAEDIC SURGERY

## 2025-06-23 PROCEDURE — 27447 TOTAL KNEE ARTHROPLASTY: CPT | Performed by: PHYSICIAN ASSISTANT

## 2025-06-23 PROCEDURE — G0378 HOSPITAL OBSERVATION PER HR: HCPCS

## 2025-06-23 PROCEDURE — 6360000002 HC RX W HCPCS: Performed by: PHYSICIAN ASSISTANT

## 2025-06-23 PROCEDURE — 2500000003 HC RX 250 WO HCPCS: Performed by: PHYSICIAN ASSISTANT

## 2025-06-23 PROCEDURE — 97116 GAIT TRAINING THERAPY: CPT

## 2025-06-23 PROCEDURE — 7100000000 HC PACU RECOVERY - FIRST 15 MIN: Performed by: ORTHOPAEDIC SURGERY

## 2025-06-23 PROCEDURE — 6370000000 HC RX 637 (ALT 250 FOR IP): Performed by: NURSE ANESTHETIST, CERTIFIED REGISTERED

## 2025-06-23 PROCEDURE — 2580000003 HC RX 258: Performed by: PHYSICIAN ASSISTANT

## 2025-06-23 PROCEDURE — 6360000002 HC RX W HCPCS: Performed by: ANESTHESIOLOGY

## 2025-06-23 PROCEDURE — C1713 ANCHOR/SCREW BN/BN,TIS/BN: HCPCS | Performed by: ORTHOPAEDIC SURGERY

## 2025-06-23 PROCEDURE — 3700000001 HC ADD 15 MINUTES (ANESTHESIA): Performed by: ORTHOPAEDIC SURGERY

## 2025-06-23 PROCEDURE — 3700000000 HC ANESTHESIA ATTENDED CARE: Performed by: ORTHOPAEDIC SURGERY

## 2025-06-23 PROCEDURE — 2580000003 HC RX 258: Performed by: ORTHOPAEDIC SURGERY

## 2025-06-23 PROCEDURE — 2500000003 HC RX 250 WO HCPCS: Performed by: NURSE ANESTHETIST, CERTIFIED REGISTERED

## 2025-06-23 PROCEDURE — C1776 JOINT DEVICE (IMPLANTABLE): HCPCS | Performed by: ORTHOPAEDIC SURGERY

## 2025-06-23 PROCEDURE — 3600000004 HC SURGERY LEVEL 4 BASE: Performed by: ORTHOPAEDIC SURGERY

## 2025-06-23 PROCEDURE — 6360000002 HC RX W HCPCS: Performed by: NURSE ANESTHETIST, CERTIFIED REGISTERED

## 2025-06-23 PROCEDURE — 2720000010 HC SURG SUPPLY STERILE: Performed by: ORTHOPAEDIC SURGERY

## 2025-06-23 PROCEDURE — 82962 GLUCOSE BLOOD TEST: CPT

## 2025-06-23 PROCEDURE — 7100000001 HC PACU RECOVERY - ADDTL 15 MIN: Performed by: ORTHOPAEDIC SURGERY

## 2025-06-23 PROCEDURE — 6370000000 HC RX 637 (ALT 250 FOR IP): Performed by: PHYSICIAN ASSISTANT

## 2025-06-23 PROCEDURE — 73560 X-RAY EXAM OF KNEE 1 OR 2: CPT

## 2025-06-23 PROCEDURE — 64447 NJX AA&/STRD FEMORAL NRV IMG: CPT | Performed by: ANESTHESIOLOGY

## 2025-06-23 PROCEDURE — 3600000014 HC SURGERY LEVEL 4 ADDTL 15MIN: Performed by: ORTHOPAEDIC SURGERY

## 2025-06-23 PROCEDURE — 6370000000 HC RX 637 (ALT 250 FOR IP): Performed by: ORTHOPAEDIC SURGERY

## 2025-06-23 PROCEDURE — 27447 TOTAL KNEE ARTHROPLASTY: CPT | Performed by: ORTHOPAEDIC SURGERY

## 2025-06-23 PROCEDURE — 97161 PT EVAL LOW COMPLEX 20 MIN: CPT

## 2025-06-23 PROCEDURE — 2709999900 HC NON-CHARGEABLE SUPPLY: Performed by: ORTHOPAEDIC SURGERY

## 2025-06-23 PROCEDURE — 6360000002 HC RX W HCPCS: Performed by: ORTHOPAEDIC SURGERY

## 2025-06-23 DEVICE — GENESIS PIN AND DRILL SET
Type: IMPLANTABLE DEVICE | Site: KNEE | Status: FUNCTIONAL
Brand: GENESIS

## 2025-06-23 DEVICE — GENESIS II OVAL RESURFACING                                    PATELLAR 35MM
Type: IMPLANTABLE DEVICE | Site: KNEE | Status: FUNCTIONAL
Brand: GENESIS II

## 2025-06-23 DEVICE — GENESIS TROCHLEAR PIN 1/8 IN. X 5IN
Type: IMPLANTABLE DEVICE | Site: KNEE | Status: FUNCTIONAL
Brand: GENESIS

## 2025-06-23 DEVICE — CEMENT BNE 20ML 41GM FULL DOSE PMMA W/ TOBRA M VISC RADPQ: Type: IMPLANTABLE DEVICE | Site: KNEE | Status: FUNCTIONAL

## 2025-06-23 DEVICE — LEGION POSTERIOR STABILIZED                                    OXINIUM FEMORAL SIZE 7 RIGHT
Type: IMPLANTABLE DEVICE | Site: KNEE | Status: FUNCTIONAL
Brand: LEGION

## 2025-06-23 DEVICE — LEGION POSTERIOR STABILIZED HIGH                                    FLEX HIGHLY CROSS LINKED                                    POLYETHYLENE SIZE 5-6 9MM
Type: IMPLANTABLE DEVICE | Site: KNEE | Status: FUNCTIONAL
Brand: LEGION

## 2025-06-23 DEVICE — GENESIS II NON-POROUS TIBIAL                                    BASEPLATE SIZE 6 RIGHT
Type: IMPLANTABLE DEVICE | Site: KNEE | Status: FUNCTIONAL
Brand: GENESIS II

## 2025-06-23 RX ORDER — SODIUM CHLORIDE 0.9 % (FLUSH) 0.9 %
5-40 SYRINGE (ML) INJECTION PRN
Status: DISCONTINUED | OUTPATIENT
Start: 2025-06-23 | End: 2025-06-23 | Stop reason: HOSPADM

## 2025-06-23 RX ORDER — LIDOCAINE HYDROCHLORIDE 10 MG/ML
5 INJECTION, SOLUTION EPIDURAL; INFILTRATION; INTRACAUDAL; PERINEURAL
Status: DISCONTINUED | OUTPATIENT
Start: 2025-06-23 | End: 2025-06-23 | Stop reason: HOSPADM

## 2025-06-23 RX ORDER — TAMSULOSIN HYDROCHLORIDE 0.4 MG/1
0.4 CAPSULE ORAL
Status: COMPLETED | OUTPATIENT
Start: 2025-06-23 | End: 2025-06-23

## 2025-06-23 RX ORDER — ONDANSETRON 4 MG/1
4 TABLET, FILM COATED ORAL EVERY 8 HOURS PRN
Qty: 30 TABLET | Refills: 2 | Status: SHIPPED | OUTPATIENT
Start: 2025-06-23

## 2025-06-23 RX ORDER — POLYETHYLENE GLYCOL 3350 17 G/17G
17 POWDER, FOR SOLUTION ORAL DAILY PRN
Status: DISCONTINUED | OUTPATIENT
Start: 2025-06-23 | End: 2025-06-23 | Stop reason: HOSPADM

## 2025-06-23 RX ORDER — SODIUM CHLORIDE, SODIUM LACTATE, POTASSIUM CHLORIDE, CALCIUM CHLORIDE 600; 310; 30; 20 MG/100ML; MG/100ML; MG/100ML; MG/100ML
INJECTION, SOLUTION INTRAVENOUS CONTINUOUS
Status: DISCONTINUED | OUTPATIENT
Start: 2025-06-23 | End: 2025-06-23 | Stop reason: HOSPADM

## 2025-06-23 RX ORDER — ONDANSETRON 4 MG/1
4 TABLET, ORALLY DISINTEGRATING ORAL EVERY 6 HOURS PRN
Status: DISCONTINUED | OUTPATIENT
Start: 2025-06-23 | End: 2025-06-23 | Stop reason: HOSPADM

## 2025-06-23 RX ORDER — SUCCINYLCHOLINE/SOD CL,ISO/PF 100 MG/5ML
SYRINGE (ML) INTRAVENOUS
Status: DISCONTINUED | OUTPATIENT
Start: 2025-06-23 | End: 2025-06-23 | Stop reason: SDUPTHER

## 2025-06-23 RX ORDER — SODIUM CHLORIDE 9 MG/ML
INJECTION, SOLUTION INTRAVENOUS CONTINUOUS
Status: DISCONTINUED | OUTPATIENT
Start: 2025-06-23 | End: 2025-06-23 | Stop reason: HOSPADM

## 2025-06-23 RX ORDER — FAMOTIDINE 20 MG/1
20 TABLET, FILM COATED ORAL ONCE
Status: COMPLETED | OUTPATIENT
Start: 2025-06-23 | End: 2025-06-23

## 2025-06-23 RX ORDER — NEOSTIGMINE METHYLSULFATE 1 MG/ML
INJECTION, SOLUTION INTRAVENOUS
Status: DISCONTINUED | OUTPATIENT
Start: 2025-06-23 | End: 2025-06-23 | Stop reason: SDUPTHER

## 2025-06-23 RX ORDER — OXYCODONE HYDROCHLORIDE 10 MG/1
10 TABLET ORAL
Status: DISCONTINUED | OUTPATIENT
Start: 2025-06-23 | End: 2025-06-23 | Stop reason: HOSPADM

## 2025-06-23 RX ORDER — ACETAMINOPHEN 500 MG
1000 TABLET ORAL EVERY 6 HOURS
Status: DISCONTINUED | OUTPATIENT
Start: 2025-06-23 | End: 2025-06-23 | Stop reason: HOSPADM

## 2025-06-23 RX ORDER — SODIUM CHLORIDE 0.9 % (FLUSH) 0.9 %
5-40 SYRINGE (ML) INJECTION EVERY 12 HOURS SCHEDULED
Status: DISCONTINUED | OUTPATIENT
Start: 2025-06-23 | End: 2025-06-23 | Stop reason: HOSPADM

## 2025-06-23 RX ORDER — ROPIVACAINE HYDROCHLORIDE 5 MG/ML
INJECTION, SOLUTION EPIDURAL; INFILTRATION; PERINEURAL
Status: COMPLETED | OUTPATIENT
Start: 2025-06-23 | End: 2025-06-23

## 2025-06-23 RX ORDER — FENTANYL CITRATE 50 UG/ML
100 INJECTION, SOLUTION INTRAMUSCULAR; INTRAVENOUS ONCE
Status: COMPLETED | OUTPATIENT
Start: 2025-06-23 | End: 2025-06-23

## 2025-06-23 RX ORDER — CELECOXIB 100 MG/1
200 CAPSULE ORAL
Status: COMPLETED | OUTPATIENT
Start: 2025-06-23 | End: 2025-06-23

## 2025-06-23 RX ORDER — FENTANYL CITRATE 50 UG/ML
INJECTION, SOLUTION INTRAMUSCULAR; INTRAVENOUS
Status: DISCONTINUED | OUTPATIENT
Start: 2025-06-23 | End: 2025-06-23 | Stop reason: SDUPTHER

## 2025-06-23 RX ORDER — MIDAZOLAM HYDROCHLORIDE 2 MG/2ML
2 INJECTION, SOLUTION INTRAMUSCULAR; INTRAVENOUS ONCE
Status: COMPLETED | OUTPATIENT
Start: 2025-06-23 | End: 2025-06-23

## 2025-06-23 RX ORDER — ASPIRIN 81 MG/1
81 TABLET ORAL 2 TIMES DAILY
Status: DISCONTINUED | OUTPATIENT
Start: 2025-06-24 | End: 2025-06-23 | Stop reason: HOSPADM

## 2025-06-23 RX ORDER — KETOROLAC TROMETHAMINE 15 MG/ML
15 INJECTION, SOLUTION INTRAMUSCULAR; INTRAVENOUS EVERY 6 HOURS
Status: DISCONTINUED | OUTPATIENT
Start: 2025-06-23 | End: 2025-06-23 | Stop reason: HOSPADM

## 2025-06-23 RX ORDER — DEXTROSE MONOHYDRATE 100 MG/ML
INJECTION, SOLUTION INTRAVENOUS CONTINUOUS PRN
Status: DISCONTINUED | OUTPATIENT
Start: 2025-06-23 | End: 2025-06-23 | Stop reason: HOSPADM

## 2025-06-23 RX ORDER — ONDANSETRON 2 MG/ML
4 INJECTION INTRAMUSCULAR; INTRAVENOUS
Status: DISCONTINUED | OUTPATIENT
Start: 2025-06-23 | End: 2025-06-23 | Stop reason: HOSPADM

## 2025-06-23 RX ORDER — GLUCAGON 1 MG
1 KIT INJECTION PRN
Status: DISCONTINUED | OUTPATIENT
Start: 2025-06-23 | End: 2025-06-23 | Stop reason: HOSPADM

## 2025-06-23 RX ORDER — GLYCOPYRROLATE 0.2 MG/ML
INJECTION INTRAMUSCULAR; INTRAVENOUS
Status: DISCONTINUED | OUTPATIENT
Start: 2025-06-23 | End: 2025-06-23 | Stop reason: SDUPTHER

## 2025-06-23 RX ORDER — LABETALOL HYDROCHLORIDE 5 MG/ML
10 INJECTION, SOLUTION INTRAVENOUS
Status: DISCONTINUED | OUTPATIENT
Start: 2025-06-23 | End: 2025-06-23 | Stop reason: HOSPADM

## 2025-06-23 RX ORDER — LABETALOL HYDROCHLORIDE 5 MG/ML
INJECTION, SOLUTION INTRAVENOUS
Status: DISCONTINUED | OUTPATIENT
Start: 2025-06-23 | End: 2025-06-23 | Stop reason: SDUPTHER

## 2025-06-23 RX ORDER — AMLODIPINE BESYLATE 10 MG/1
10 TABLET ORAL DAILY
Status: DISCONTINUED | OUTPATIENT
Start: 2025-06-24 | End: 2025-06-23 | Stop reason: HOSPADM

## 2025-06-23 RX ORDER — ROPIVACAINE HYDROCHLORIDE 5 MG/ML
30 INJECTION, SOLUTION EPIDURAL; INFILTRATION; PERINEURAL ONCE
Status: DISCONTINUED | OUTPATIENT
Start: 2025-06-23 | End: 2025-06-23 | Stop reason: HOSPADM

## 2025-06-23 RX ORDER — ONDANSETRON 2 MG/ML
4 INJECTION INTRAMUSCULAR; INTRAVENOUS EVERY 6 HOURS PRN
Status: DISCONTINUED | OUTPATIENT
Start: 2025-06-23 | End: 2025-06-23 | Stop reason: HOSPADM

## 2025-06-23 RX ORDER — LEVOTHYROXINE SODIUM 88 UG/1
88 TABLET ORAL DAILY
Status: DISCONTINUED | OUTPATIENT
Start: 2025-06-23 | End: 2025-06-23 | Stop reason: HOSPADM

## 2025-06-23 RX ORDER — PROMETHAZINE HYDROCHLORIDE 12.5 MG/1
12.5 TABLET ORAL ONCE
Status: COMPLETED | OUTPATIENT
Start: 2025-06-23 | End: 2025-06-23

## 2025-06-23 RX ORDER — OXYCODONE HYDROCHLORIDE 5 MG/1
5 TABLET ORAL
Status: COMPLETED | OUTPATIENT
Start: 2025-06-23 | End: 2025-06-23

## 2025-06-23 RX ORDER — FAMOTIDINE 20 MG/1
20 TABLET, FILM COATED ORAL 2 TIMES DAILY
Status: DISCONTINUED | OUTPATIENT
Start: 2025-06-23 | End: 2025-06-23 | Stop reason: HOSPADM

## 2025-06-23 RX ORDER — DIPHENHYDRAMINE HYDROCHLORIDE 50 MG/ML
12.5 INJECTION, SOLUTION INTRAMUSCULAR; INTRAVENOUS
Status: DISCONTINUED | OUTPATIENT
Start: 2025-06-23 | End: 2025-06-23 | Stop reason: HOSPADM

## 2025-06-23 RX ORDER — ASPIRIN 81 MG/1
81 TABLET ORAL 2 TIMES DAILY
Qty: 60 TABLET | Refills: 3 | Status: SHIPPED | OUTPATIENT
Start: 2025-06-23

## 2025-06-23 RX ORDER — LIDOCAINE HYDROCHLORIDE 20 MG/ML
INJECTION, SOLUTION EPIDURAL; INFILTRATION; INTRACAUDAL; PERINEURAL
Status: DISCONTINUED | OUTPATIENT
Start: 2025-06-23 | End: 2025-06-23 | Stop reason: SDUPTHER

## 2025-06-23 RX ORDER — SODIUM CHLORIDE 9 MG/ML
INJECTION, SOLUTION INTRAVENOUS PRN
Status: DISCONTINUED | OUTPATIENT
Start: 2025-06-23 | End: 2025-06-23 | Stop reason: HOSPADM

## 2025-06-23 RX ORDER — INSULIN LISPRO 100 [IU]/ML
0-12 INJECTION, SOLUTION INTRAVENOUS; SUBCUTANEOUS ONCE
Status: DISCONTINUED | OUTPATIENT
Start: 2025-06-23 | End: 2025-06-23 | Stop reason: HOSPADM

## 2025-06-23 RX ORDER — DEXMEDETOMIDINE HYDROCHLORIDE 100 UG/ML
INJECTION, SOLUTION INTRAVENOUS
Status: DISCONTINUED | OUTPATIENT
Start: 2025-06-23 | End: 2025-06-23 | Stop reason: SDUPTHER

## 2025-06-23 RX ORDER — CELECOXIB 200 MG/1
200 CAPSULE ORAL DAILY
Qty: 30 CAPSULE | Refills: 2 | Status: SHIPPED | OUTPATIENT
Start: 2025-06-23

## 2025-06-23 RX ORDER — OXYCODONE HYDROCHLORIDE 5 MG/1
5 TABLET ORAL EVERY 4 HOURS PRN
Qty: 42 TABLET | Refills: 0 | Status: SHIPPED | OUTPATIENT
Start: 2025-06-23 | End: 2025-06-30

## 2025-06-23 RX ORDER — SODIUM CHLORIDE, SODIUM LACTATE, POTASSIUM CHLORIDE, CALCIUM CHLORIDE 600; 310; 30; 20 MG/100ML; MG/100ML; MG/100ML; MG/100ML
INJECTION, SOLUTION INTRAVENOUS
Status: DISCONTINUED | OUTPATIENT
Start: 2025-06-23 | End: 2025-06-23 | Stop reason: SDUPTHER

## 2025-06-23 RX ORDER — DOCUSATE SODIUM 100 MG/1
100 CAPSULE, LIQUID FILLED ORAL 2 TIMES DAILY
Qty: 60 CAPSULE | Refills: 1 | Status: SHIPPED | OUTPATIENT
Start: 2025-06-23 | End: 2025-08-22

## 2025-06-23 RX ORDER — NALOXONE HYDROCHLORIDE 0.4 MG/ML
INJECTION, SOLUTION INTRAMUSCULAR; INTRAVENOUS; SUBCUTANEOUS PRN
Status: DISCONTINUED | OUTPATIENT
Start: 2025-06-23 | End: 2025-06-23 | Stop reason: HOSPADM

## 2025-06-23 RX ORDER — CEFADROXIL 500 MG/1
500 CAPSULE ORAL 2 TIMES DAILY
Qty: 10 CAPSULE | Refills: 0 | Status: SHIPPED | OUTPATIENT
Start: 2025-06-23 | End: 2025-06-28

## 2025-06-23 RX ORDER — OXYCODONE HYDROCHLORIDE 5 MG/1
5 TABLET ORAL
Status: DISCONTINUED | OUTPATIENT
Start: 2025-06-23 | End: 2025-06-23 | Stop reason: HOSPADM

## 2025-06-23 RX ORDER — FENTANYL CITRATE 50 UG/ML
50 INJECTION, SOLUTION INTRAMUSCULAR; INTRAVENOUS EVERY 5 MIN PRN
Status: DISCONTINUED | OUTPATIENT
Start: 2025-06-23 | End: 2025-06-23 | Stop reason: HOSPADM

## 2025-06-23 RX ORDER — ACETAMINOPHEN 500 MG
1000 TABLET ORAL
Status: COMPLETED | OUTPATIENT
Start: 2025-06-23 | End: 2025-06-23

## 2025-06-23 RX ORDER — SENNA AND DOCUSATE SODIUM 50; 8.6 MG/1; MG/1
1 TABLET, FILM COATED ORAL 2 TIMES DAILY
Status: DISCONTINUED | OUTPATIENT
Start: 2025-06-23 | End: 2025-06-23 | Stop reason: HOSPADM

## 2025-06-23 RX ORDER — ESMOLOL HYDROCHLORIDE 10 MG/ML
INJECTION INTRAVENOUS
Status: DISCONTINUED | OUTPATIENT
Start: 2025-06-23 | End: 2025-06-23 | Stop reason: SDUPTHER

## 2025-06-23 RX ORDER — ACETAMINOPHEN 500 MG
1000 TABLET ORAL EVERY 8 HOURS PRN
Qty: 90 TABLET | Refills: 1 | Status: SHIPPED | OUTPATIENT
Start: 2025-06-23

## 2025-06-23 RX ORDER — PROPOFOL 10 MG/ML
INJECTION, EMULSION INTRAVENOUS
Status: DISCONTINUED | OUTPATIENT
Start: 2025-06-23 | End: 2025-06-23 | Stop reason: SDUPTHER

## 2025-06-23 RX ORDER — INSULIN LISPRO 100 [IU]/ML
0-8 INJECTION, SOLUTION INTRAVENOUS; SUBCUTANEOUS
Status: DISCONTINUED | OUTPATIENT
Start: 2025-06-23 | End: 2025-06-23 | Stop reason: HOSPADM

## 2025-06-23 RX ORDER — ROCURONIUM BROMIDE 10 MG/ML
INJECTION, SOLUTION INTRAVENOUS
Status: DISCONTINUED | OUTPATIENT
Start: 2025-06-23 | End: 2025-06-23 | Stop reason: SDUPTHER

## 2025-06-23 RX ORDER — TAMSULOSIN HYDROCHLORIDE 0.4 MG/1
0.4 CAPSULE ORAL 2 TIMES DAILY
Status: DISCONTINUED | OUTPATIENT
Start: 2025-06-23 | End: 2025-06-23 | Stop reason: HOSPADM

## 2025-06-23 RX ADMIN — FAMOTIDINE 20 MG: 20 TABLET, FILM COATED ORAL at 11:46

## 2025-06-23 RX ADMIN — GLYCOPYRROLATE 0.4 MG: 0.2 INJECTION INTRAMUSCULAR; INTRAVENOUS at 13:59

## 2025-06-23 RX ADMIN — ACETAMINOPHEN 1000 MG: 500 TABLET ORAL at 11:46

## 2025-06-23 RX ADMIN — PROPOFOL 50 MG: 10 INJECTION, EMULSION INTRAVENOUS at 12:53

## 2025-06-23 RX ADMIN — WATER 2000 MG: 1 INJECTION INTRAMUSCULAR; INTRAVENOUS; SUBCUTANEOUS at 12:27

## 2025-06-23 RX ADMIN — FENTANYL CITRATE 100 MCG: 50 INJECTION INTRAMUSCULAR; INTRAVENOUS at 11:54

## 2025-06-23 RX ADMIN — FENTANYL CITRATE 100 MCG: 50 INJECTION INTRAMUSCULAR; INTRAVENOUS at 12:39

## 2025-06-23 RX ADMIN — MIDAZOLAM HYDROCHLORIDE 2 MG: 1 INJECTION, SOLUTION INTRAMUSCULAR; INTRAVENOUS at 11:54

## 2025-06-23 RX ADMIN — TAMSULOSIN HYDROCHLORIDE 0.4 MG: 0.4 CAPSULE ORAL at 11:46

## 2025-06-23 RX ADMIN — OXYCODONE HYDROCHLORIDE 5 MG: 5 TABLET ORAL at 14:46

## 2025-06-23 RX ADMIN — LIDOCAINE HYDROCHLORIDE 50 MG: 20 INJECTION, SOLUTION EPIDURAL; INFILTRATION; INTRACAUDAL; PERINEURAL at 12:20

## 2025-06-23 RX ADMIN — LABETALOL HYDROCHLORIDE 5 MG: 5 INJECTION, SOLUTION INTRAVENOUS at 12:44

## 2025-06-23 RX ADMIN — TRANEXAMIC ACID 1000 MG: 100 INJECTION, SOLUTION INTRAVENOUS at 13:50

## 2025-06-23 RX ADMIN — SODIUM CHLORIDE, SODIUM LACTATE, POTASSIUM CHLORIDE, AND CALCIUM CHLORIDE: 600; 310; 30; 20 INJECTION, SOLUTION INTRAVENOUS at 12:12

## 2025-06-23 RX ADMIN — Medication 140 MG: at 12:20

## 2025-06-23 RX ADMIN — ESMOLOL HYDROCHLORIDE 20 MG: 10 INJECTION, SOLUTION INTRAVENOUS at 12:39

## 2025-06-23 RX ADMIN — PROMETHAZINE HYDROCHLORIDE 12.5 MG: 12.5 TABLET ORAL at 11:46

## 2025-06-23 RX ADMIN — DEXMEDETOMIDINE 20 MCG: 200 INJECTION, SOLUTION INTRAVENOUS at 12:41

## 2025-06-23 RX ADMIN — NEOSTIGMINE METHYLSULFATE 2 MG: 1 INJECTION INTRAVENOUS at 13:59

## 2025-06-23 RX ADMIN — ROPIVACAINE HYDROCHLORIDE 25 ML: 5 INJECTION EPIDURAL; INFILTRATION; PERINEURAL at 11:51

## 2025-06-23 RX ADMIN — SODIUM CHLORIDE, SODIUM LACTATE, POTASSIUM CHLORIDE, AND CALCIUM CHLORIDE: .6; .31; .03; .02 INJECTION, SOLUTION INTRAVENOUS at 11:45

## 2025-06-23 RX ADMIN — TRANEXAMIC ACID 1000 MG: 100 INJECTION, SOLUTION INTRAVENOUS at 12:30

## 2025-06-23 RX ADMIN — PROPOFOL 200 MG: 10 INJECTION, EMULSION INTRAVENOUS at 12:20

## 2025-06-23 RX ADMIN — ROCURONIUM BROMIDE 5 MG: 50 INJECTION INTRAVENOUS at 12:20

## 2025-06-23 RX ADMIN — CELECOXIB 200 MG: 100 CAPSULE ORAL at 11:46

## 2025-06-23 ASSESSMENT — PAIN - FUNCTIONAL ASSESSMENT
PAIN_FUNCTIONAL_ASSESSMENT: ACTIVITIES ARE NOT PREVENTED
PAIN_FUNCTIONAL_ASSESSMENT: 0-10

## 2025-06-23 ASSESSMENT — PAIN DESCRIPTION - DESCRIPTORS: DESCRIPTORS: THROBBING

## 2025-06-23 ASSESSMENT — PAIN SCALES - GENERAL
PAINLEVEL_OUTOF10: 0
PAINLEVEL_OUTOF10: 2
PAINLEVEL_OUTOF10: 2

## 2025-06-23 ASSESSMENT — PAIN DESCRIPTION - ORIENTATION: ORIENTATION: RIGHT

## 2025-06-23 ASSESSMENT — PAIN DESCRIPTION - PAIN TYPE: TYPE: SURGICAL PAIN

## 2025-06-23 ASSESSMENT — PAIN DESCRIPTION - LOCATION: LOCATION: KNEE

## 2025-06-23 NOTE — PERIOP NOTE
Patient /Family /Designee has been informed that [facility] is not responsible for patient belongings per policy and the signed [facility] Patient Agreement document.  Personal items should be sent home or checked in with security.  Patient /Family /Designee selected the following action:                            [x]  Send personal items home with a family member or friend                                                 []  Check in personal items with security, excluding clothing                            []  Maintain personal items at the bedside, against recommendation                                 by José Miguel Ley [facility]                                   ** If patient /family /designee chooses to maintain personal items at the bedside,                                      Complete the patient belongings inventory in the EMR.

## 2025-06-23 NOTE — PERIOP NOTE
1512: TRANSFER - OUT REPORT:    Telephone report given to NAVEEN Dixon on Davie Kay  being transferred to 2 Surgical (2214) for routine post-op       Report consisted of patient's Situation, Background, Assessment and   Recommendations(SBAR).     Information from the following report(s) Nurse Handoff Report, Index, Adult Overview, and Surgery Report was reviewed with the receiving nurse.           Lines:   Peripheral IV 06/23/25 Distal;Left Antecubital (Active)   Site Assessment Clean, dry & intact 06/23/25 1505   Line Status Infusing 06/23/25 1130   Line Care Connections checked and tightened 06/23/25 1505   Phlebitis Assessment No symptoms 06/23/25 1505   Infiltration Assessment 0 06/23/25 1505   Dressing Status Clean, dry & intact 06/23/25 1505   Dressing Type Transparent 06/23/25 1505   Dressing Intervention New 06/23/25 1130        Opportunity for questions and clarification was provided.      Patient transported with:  Registered Nurse

## 2025-06-23 NOTE — ANESTHESIA PROCEDURE NOTES
Peripheral Block    Patient location during procedure: pre-op  Reason for block: post-op pain management and at surgeon's request  Start time: 6/23/2025 11:51 AM  End time: 6/23/2025 11:58 AM  Staffing  Performed: anesthesiologist   Anesthesiologist: Lb Portillo MD  Performed by: Lb Portillo MD  Authorized by: Lb Portillo MD    Preanesthetic Checklist  Completed: patient identified, IV checked, site marked, risks and benefits discussed, surgical/procedural consents, equipment checked, pre-op evaluation, timeout performed, anesthesia consent given, oxygen available, monitors applied/VS acknowledged, fire risk safety assessment completed and verbalized and blood product R/B/A discussed and consented  Peripheral Block   Patient position: supine  Prep: ChloraPrep  Provider prep: mask and sterile gloves  Patient monitoring: cardiac monitor, continuous pulse ox, continuous capnometry, frequent blood pressure checks, IV access, oxygen and responsive to questions  Block type: Femoral  Adductor canal  Laterality: right  Injection technique: single-shot  Guidance: ultrasound guided  Local infiltration: lidocaine  Infiltration strength: 1 %  Local infiltration: lidocaine  Dose: 1 mL    Needle   Needle gauge: 21 G  Needle localization: ultrasound guidance  Needle length: 10 cm  Assessment   Injection assessment: negative aspiration for heme, low pressure verified by pressure monitor, no paresthesia on injection, local visualized surrounding nerve on ultrasound and no intravascular symptoms  Paresthesia pain: none  Slow fractionated injection: yes  Hemodynamics: stable  Outcomes: uncomplicated    Medications Administered  ropivacaine (NAROPIN) injection 0.5% - Perineural   25 mL - 6/23/2025 11:51:00 AM

## 2025-06-23 NOTE — INTERVAL H&P NOTE
Update History & Physical    The patient's History and Physical of June 23, 2025 was reviewed with the patient and I examined the patient. There was no change. The surgical site was confirmed by the patient and me.     Plan: The risks, benefits, expected outcome, and alternative to the recommended procedure have been discussed with the patient. Patient understands and wants to proceed with the procedure.     Electronically signed by MARY HODGE MD on 6/23/2025 at 11:26 AM

## 2025-06-23 NOTE — OP NOTE
Operative Note      Patient: Davie Kay  YOB: 1965  MRN: [id number]    Date of Procedure: 6/23/2025    Pre-Op Diagnosis Codes:      * Osteoarthritis of right knee [M17.11] with fixed varus deformity ankylosis and significant preoperative patellar maltracking history of Osgood-Schlatter's disease type I obesity    Post-Op Diagnosis: Same       Procedure(s):  Right total knee replacement.    Surgeon(s):  Larry Dong MD    Assistant:   Surgical Assistant: Aashish Kent  Physician Assistant: Jean Hughes PA-C first assistant    Anesthesia: General    Estimated Blood Loss (mL): Less than 50    Complications: None    Specimens:   * No specimens in log *    Implants:  Implant Name Type Inv. Item Serial No.  Lot No. LRB No. Used Action   CEMENT BNE 20ML 41GM FULL DOSE PMMA W/ TOBRA M VISC RADPQ - [id number]  CEMENT BNE 20ML 41GM FULL DOSE PMMA W/ TOBRA M VISC RADPQ  [employer] [facility] [id number] Right 2 Implanted   SET PIN L76MM DIA3.2MM S STL FLUT 127X3.2MM DRL DISP GEN - [id number]  SET PIN L76MM DIA3.2MM S STL FLUT 127X3.2MM DRL DISP GEN  [facility]  Right 1 Implanted   PIN FIX L5IN DIA1/8IN S STL TRCR TIP FOR CNTR GEN II - [id number]  PIN FIX L5IN DIA1/8IN S STL TRCR TIP FOR CNTR GEN II  [employer]- [id number] Right 1 Implanted   BASEPLATE TIB SZ 6 AP54MM ML77MM THK2.3MM R KNEE TI ALLY NP - [id number]  BASEPLATE TIB SZ 6 AP54MM ML77MM THK2.3MM R KNEE TI ALLY NP  [facility] [id number] Right 1 Implanted   COMPONENT FEM SZ 7 R KNEE OXINIUM POST STBL JAC LEGION - [id number]  COMPONENT FEM SZ 7 R KNEE OXINIUM POST STBL JAC LEGION  [facility] [id number] Right 1 Implanted   COMPONENT PAT [id number] THK9MM KNEE OVL RESURF GEN II LEGION - [id number]  COMPONENT PAT [id number] THK9MM KNEE OVL RESURF GEN II [facility]  [employer]- [id number] Right 1 Implanted   INSERT TIB SZ 5-6 THK9MM KNEE

## 2025-06-23 NOTE — DISCHARGE SUMMARY
6/23/2025  9:40 AM    6/23/2025, 2:10 PM    Primary Dx:right Orthopedic / Rheumatologic: Total Knee Replacement  Secondary Dx: Etiological Diagnoses: none    HPI:  Pt has end stage OA of their right knee and had failed conservative treatment.  Due to the current findings and affected activity of daily living surgical intervention is indicated.  The alternatives, risks, complications as well as expected outcome were discussed, the patient understands and wishes to proceed with surgery    Past Medical History:   Diagnosis Date    Chronic back pain     Diabetes (HCC)     Ozempic    Hypertension     Hypothyroid     Takes levothyroxine    JORDON on CPAP 2006         Current Facility-Administered Medications:     insulin lispro (HUMALOG,ADMELOG) injection vial 0-12 Units, 0-12 Units, SubCUTAneous, Once, Verito Deutsch APRN - CRNA    glucose chewable tablet 16 g, 4 tablet, Oral, PRN, Verito Deutsch APRN - CRNA    dextrose bolus 10% 125 mL, 125 mL, IntraVENous, PRN **OR** dextrose bolus 10% 250 mL, 250 mL, IntraVENous, PRN, Verito Deutsch APRN - CRNA    glucagon injection 1 mg, 1 mg, SubCUTAneous, PRN, Verito Deutsch APRN - CRNA    dextrose 10 % infusion, , IntraVENous, Continuous PRN, Verito Deutsch APRN - CRNA    lidocaine PF 1 % injection 5 mL, 5 mL, IntraDERmal, Once PRN, Verito Deutsch APRN - CRNA    lactated ringers infusion, , IntraVENous, Continuous, Verito Deutsch APRN - CRNA, Last Rate: 75 mL/hr at 06/23/25 1145, New Bag at 06/23/25 1145    sodium chloride flush 0.9 % injection 5-40 mL, 5-40 mL, IntraVENous, 2 times per day, Verito Deutsch APRN - CRNA    sodium chloride flush 0.9 % injection 5-40 mL, 5-40 mL, IntraVENous, PRN, Verito Deutsch APRN - CRNA    0.9 % sodium chloride infusion, , IntraVENous, PRN, Verito Deutsch APRN - CRNA    ROPivacaine (NAROPIN) 0.5% injection 30 mL, 30 mL, Other, Once, Verito Deutsch APRN - CRNA    sodium chloride (PF) 0.9 % 25 mL with BUPivacaine  Once PRN, Viki Rider, APRN - CRNA, 50 mg at 06/23/25 1220    neostigmine (PROSTIGMINE) injection, , IntraVENous, Once PRN, de Dennis, Viki C, APRN - CRNA, 2 mg at 06/23/25 1359    glycopyrrolate (ROBINUL) injection, , IntraVENous, Once PRN, pan Collins, Viki C, APRN - CRNA, 0.4 mg at 06/23/25 1359    Patient has no known allergies.    Physical Exam:  General A&O x3 NAD, well developed, well nourished, normal affect  Heart: S1-S2, RRR  Lungs: CTA Bilat  Abd: soft NT, ND  Ext: n/v intact    Hospital Course:    Pt. Had rightOrthopedic / Rheumatologic: Total Knee Replacement    Post -op Course:  The patient tolerated the procedure well.  Pt. Was place on Abx pre and post-op for prophylaxis against infection as well as aspirin post-op for prophylaxis against DVT.    Vitals signs remained stable, remained af.  The wound wasclean, dry, no drainage.  Pain was well controlled.  Pt. Had negative calf tenderness or swelling, no evidence for DVT.  Patient had PT/OT consult for evaluation and treatment.    CBC  Lab Results   Component Value Date/Time    WBC 7.9 06/02/2025 08:30 AM    RBC 4.10 (L) 06/02/2025 08:30 AM    HCT 39.2 06/02/2025 08:30 AM    MCV 95.6 06/02/2025 08:30 AM    MCH 32.0 06/02/2025 08:30 AM    MCHC 33.4 06/02/2025 08:30 AM    RDW 13.0 06/02/2025 08:30 AM    MPV 10.9 06/02/2025 08:30 AM     Coagulation  Lab Results   Component Value Date    INR 0.9 06/02/2025    APTT 28.9 06/02/2025      Basic Metabolic Profile  Lab Results   Component Value Date     06/02/2025    CO2 26 06/02/2025    BUN 15 06/02/2025    GLUCOSE 104 06/02/2025    CALCIUM 9.9 06/02/2025       Discharge Meds:  Current Discharge Medication List        START taking these medications    Details   aspirin (ASPIRIN 81) 81 MG EC tablet Take 1 tablet by mouth in the morning and at bedtime  Qty: 60 tablet, Refills: 3      cefadroxil (DURICEF) 500 MG capsule Take 1 capsule by mouth 2 times daily for 5 days  Qty: 10 capsule, Refills: 0

## 2025-06-23 NOTE — DISCHARGE INSTRUCTIONS
DISCHARGE ACTIVITY  ?   Walking is the most important therapy after a hip replacement. Gradually increase your walking daily. Start bywalking daily 5-10 minutes 3-4 times per day. The goal is to be walking 20-30 minutes 1-2 times per day by 6 weeks post-op. You may progress to a single crutch or cane as tolerated.  ?  Weight-bearing as tolerated with walker or crutches for a minimum of 2 weeks to prevent falls. (Discuss with your surgeon at 2 week post-op appointment) Slowly transition to a cane and then nothing when you feel comfortable and safe (everyone is different). This can take anywhere from a few weeks to a few months.  ?  No running or high impact activities.  ?  You may put full weight on your hip unless instructed otherwise.  ?  HIP REPLACEMENT PRECAUTIONS     Avoid extremes of motion.  No pivoting on operative leg for 6 weeks.     SLEEP     You may sleep on your back, or either side with a pillow between your knees.  You may resume sexual relations according to your comfort and must avoid extremes of motion.     BLOOD CLOT PREVENTION     You will be on a medication to prevent blood clots for 4-6 weeks after surgery. Take as directed until all tablets are taken.  ?   Walking regularly every day helps decrease the risk of blood clots!     COMPRESSION STOCKINGS     Wear knee-high compression stockings during the day as needed until swelling resolves. Remove stockings at bedtime.        WOUND DRESSING INSTRUCTIONS     Please do not remove the bandage. Keep in place until follow-up appointment 10-14 days after surgery.        SHOWERING  ?   It is OK to shower ONLY if incision remains dry.  ?   You may shower with the outer dressing in place, immediately following surgery. Make sure the edges are sealed to avoid getting the wound wet. If becomes wet under bandage, remove and call the Clinic.     After bandage is removed (at 2 week postoperative visit),   ?   Do not wash or scrub the incision for 6 weeks.  ?

## 2025-06-23 NOTE — ANESTHESIA POSTPROCEDURE EVALUATION
Department of Anesthesiology  Postprocedure Note    Patient: Davie Kay  MRN: [id number]  YOB: 1965  Date of evaluation: 6/23/2025    Procedure Summary       Date: 06/23/25 Room / Location: [facility] MAIN 07 / [facility] MAIN OR    Anesthesia Start: 1212 Anesthesia Stop: 1414    Procedure: Right total knee replacement. (Right: Knee) Diagnosis:       Osteoarthritis of right knee      (Osteoarthritis of right knee [M17.11])    Surgeons: Larry Dong MD Responsible Provider: Lb Portillo MD    Anesthesia Type: General, Regional ASA Status: 3            Anesthesia Type: General, Regional    Isis Phase I:      Isis Phase II:      Anesthesia Post Evaluation    Patient location during evaluation: bedside  Patient participation: complete - patient participated  Level of consciousness: responsive to verbal stimuli  Airway patency: patent  Nausea & Vomiting: no nausea  Respiratory status: acceptable  Hydration status: euvolemic    No notable events documented.

## 2025-06-23 NOTE — NURSE NAVIGATOR
Rounded on patient s/p right total knee replacement with Dr. Dong, dos 06/23/2025 Patient observed to be alert and oriented x 3, sitting up in bedside chair. He denies chest pain, shortness of breath, nausea, vomiting or calf pain. He reports his pain as controlled at present, he denies any numbness to his lower extremities. He has quadricept fire to his left leg. Ace wrap observed to right lower extremity, dressing underneath observed to be clean, dry and intact with ice pack in place for comfort. Patient was able to ambulate with steady gait and use of rolling walker to bedside chair. He is tolerating food and beverage.       Patient had had a knee replacement on his other knee, so has all required DME at home and a strong support system in place His medications have been sent to the [facility] outpatient pharmacy for . He lives in a two story home with two steps to enter. His bedroom is upstairs requiring 14 steps to navigate.       Today patient was provided with a total knee replacement education book, medication education sheet, medication schedule and frequently asked knee question hand out. He was instructed that his ace wrap may be removed tonight and should be used for compression to assist with swelling in foot and ankle during the daytime only.        Reviewed the use of incentive spirometry. Patient encouraged to use ten times hourly while in hospital and to continue use at home for the next few days to keep lungs expanded and free from complications. He was educated that lack of use could result in low grade fevers of 99- 100. He was reminded that fevers are not worrisome unless they go above 101.3 and remain despite use of incentive spirometry.             Reviewed postoperative showering instructions. Patient was reminded that he may shower on Wednesday, No tubs or submersion in water for a full six weeks. He is to contact clinic with any dressing issues. he was instructed that in the event that

## 2025-06-23 NOTE — ANESTHESIA PRE PROCEDURE
Provider Last Rate Last Admin   • insulin lispro (HUMALOG,ADMELOG) injection vial 0-12 Units  0-12 Units SubCUTAneous Once Verito Deutsch APRN - CRNA       • glucose chewable tablet 16 g  4 tablet Oral PRN Verito Deutsch APRN - CRNA       • dextrose bolus 10% 125 mL  125 mL IntraVENous PRN Verito Deutsch APRN - CRNA        Or   • dextrose bolus 10% 250 mL  250 mL IntraVENous PRN Verito Deutsch APRN - CRNA       • glucagon injection 1 mg  1 mg SubCUTAneous PRN Verito Deutsch APRN - CRNA       • dextrose 10 % infusion   IntraVENous Continuous PRN Verito Deutsch APRN - CRNA       • lidocaine PF 1 % injection 5 mL  5 mL IntraDERmal Once PRN Verito Deutsch APRN - CRNA       • fentaNYL (SUBLIMAZE) injection 100 mcg  100 mcg IntraVENous Once Verito Deutsch APRN - CRNA       • famotidine (PEPCID) tablet 20 mg  20 mg Oral Once Verito Deutsch APRN - CRNA       • lactated ringers infusion   IntraVENous Continuous Verito Deutsch APRN - CRNA       • sodium chloride flush 0.9 % injection 5-40 mL  5-40 mL IntraVENous 2 times per day Verito Deutsch APRN - CRNA       • sodium chloride flush 0.9 % injection 5-40 mL  5-40 mL IntraVENous PRN Verito Deutsch APRN - CRNA       • 0.9 % sodium chloride infusion   IntraVENous PRN Verito Deutsch APRN - CRNA       • midazolam PF (VERSED) injection 2 mg  2 mg IntraVENous Once Verito Deutsch APRN - CRNA       • ROPivacaine (NAROPIN) 0.5% injection 30 mL  30 mL Other Once Verito Deutsch APRN - CRNA       • promethazine (PHENERGAN) tablet 12.5 mg  12.5 mg Oral Once Verito Deutsch APRN - CRNA       • acetaminophen (TYLENOL) tablet 1,000 mg  1,000 mg Oral On Call to OR Jean Hughes PA-C       • celecoxib (CELEBREX) capsule 200 mg  200 mg Oral On Call to OR Jean Hughes PA-C       • ceFAZolin (ANCEF) 2,000 mg in sterile water 20 mL IV syringe  2,000 mg IntraVENous On Call to OR Jean Hughes PA-C       • tamsulosin (FLOMAX) capsule 0.4 mg  0.4 mg

## 2025-06-23 NOTE — PROGRESS NOTES
Physical Therapy  PHYSICAL THERAPY EVALUATION/DISCHARGE    Patient: Davie Kay (59 y.o. male)  Date: 6/23/2025  Primary Diagnosis: Osteoarthritis of right knee [M17.11]  Arthritis of knee [M17.10]  Procedure(s) (LRB):  Right total knee replacement. (Right) * Day of Surgery *   Precautions: General Precautions, Weight Bearing, Right Lower Extremity Weight Bearing: Weight Bearing As Tolerated,  ,  ,  ,  ,  ,    PLOF: Pt lives with his Wife in a two story home, has a chair lift, with level entry.  Pt was independent with all mobility, has RW.      ASSESSMENT AND RECOMMENDATIONS:  Patient is 58 yo male admitted to hospital for TKA and presents today POD 0. Patient was up in recliner upon arrival and agreeable to therapy. Patient was educated on weight bearing status and role of therapy. Patient demonstrated good SLR and objective assessment performed prior to mobilizing. Patient was given demo with instruction on sit <> stand transfer and gait training. Patient transferred to standing with RW and ambulated 150ft Alysha.  At conclusion of session patient transferred to sitting in recliner and was left resting with call bell by the side and towel roll under ankle. Patient was educated on HEP, ice use, ambulation frequency. All questions answered. Pt is safe for transition home from PT standpoint. Will sign off, RN updated.      Patient does not require further skilled physical therapy intervention at this level of care.    Further Equipment Recommendations for Discharge: Patient has all needed DME for home safety.    [facility]: AM-PAC Inpatient Mobility Raw Score : 21      Current research shows that an AM-PAC score of 18 (14 without stairs) or greater is associated with a discharge to the patient's home setting.    This AMPAC score should be considered in conjunction with interdisciplinary team recommendations to determine the most appropriate discharge setting. Patient's social support, diagnosis, medical stability, and  prior level of function should also be taken into consideration.     SUBJECTIVE:   Patient stated “I'm doing pretty good.”    OBJECTIVE DATA SUMMARY:     Past Medical History:   Diagnosis Date    Chronic back pain     Diabetes (HCC)     Ozempic    Hypertension     Hypothyroid     Takes levothyroxine    JORDON on CPAP 2006     Past Surgical History:   Procedure Laterality Date    ANTERIOR LUMBAR FUSION W/ FRA  2012    COLONOSCOPY  08/2024    KNEE ARTHROSCOPY Right 8/30/2024    RIGHT KNEE ARTHROSCOPIC PARTIAL MEDIAL AND LATERAL MENISCECTOMY performed by Kiko Segura MD at [facility] MAIN OR    KNEE SURGERY Left 05/01/2023    LAURI KNEE MANIPULATION performed by Larry Dong MD at [facility] MAIN OR    LUMBAR FUSION  1999 2005 last    TOTAL KNEE ARTHROPLASTY Left 01/16/2023       Home Situation:  Social/Functional History  Lives With: Spouse  Type of Home: House  Home Layout: Two level (has chair lift)  Home Access: Level entry  Home Equipment: Walker - Rolling  Has the patient had two or more falls in the past year or any fall with injury in the past year?: No  Prior Level of Assist for ADLs: Independent  Prior Level of Assist for Ambulation: Independent community ambulator, with or without device  Prior Level of Assist for Transfers: Independent  Critical Behavior:  Orientation  Overall Orientation Status: Within Normal Limits  Orientation Level: Oriented X4  Cognition  Overall Cognitive Status: WNL    Strength:    Strength: Generally decreased, functional    Tone & Sensation:   Tone: Normal  Sensation: Impaired    Range Of Motion:  AROM: Generally decreased, functional  PROM: Generally decreased, functional    Functional Mobility:  Bed Mobility:     Bed Mobility Training  Bed Mobility Training: No  Transfers:     Transfer Training  Transfer Training: Yes  Sit to Stand: Modified independent  Stand to Sit: Modified independent  Balance:               Balance  Sitting: Intact  Standing: Intact;With support  Wheelchair

## 2025-06-25 ENCOUNTER — TELEPHONE (OUTPATIENT)
Facility: HOSPITAL | Age: 60
End: 2025-06-25

## 2025-06-25 NOTE — TELEPHONE ENCOUNTER
Call placed to patient, ID verified x 2. Patient is s/p right total knee replacement with Dr. Dong, dos 06/23/2025. Patient denies chest pain, shortness of breath,nauseas , vomiting or calf pain. He denies any residual numbness in his right lower extremity, he denies any difficulty with bladder , he is passing gas.       He states that his pain is well controlled at present, taking his medications scheduled as prescribed. He does report a great deal of swelling. He was reminded that he should be continuously icing if he not ambulating at this point. He was encouraged today to tailor back on his ambulating as he is ambulating quite a bit. Recommended today that he elevate leg up on two pillows length wise to ensure that knee is straight, but that leg it is above his heart . He was reassured that this will help bring down some of the swelling.       He states that he is ambulating hourly , he is icing , he continues to work on his quad sets and ankle pumps. He is starting to work on his bend.        He reports his dressing as dry and intact with just a few small spots on it. He states that physical therapy has already been out working with him and that they circled the spots to track drainage. He was reassured that home physical therapy has orders to change his dressing if it becomes more than 65% drainage. He was reminded that some drainage is normal as his swelling has peaked and he is taking a blood thinner to help prevent blood clots.       Overall patient feels he is doing well. He has no questions or concerns at this time. He will follow up with CHIDI Hughes in two weeks or sooner if needed.

## 2025-07-10 ENCOUNTER — OFFICE VISIT (OUTPATIENT)
Age: 60
End: 2025-07-10

## 2025-07-10 DIAGNOSIS — Z96.651 STATUS POST RIGHT KNEE REPLACEMENT: Primary | ICD-10-CM

## 2025-07-10 DIAGNOSIS — Z48.02 ENCOUNTER FOR STAPLE REMOVAL: ICD-10-CM

## 2025-07-10 DIAGNOSIS — G89.18 POST-OP PAIN: ICD-10-CM

## 2025-07-10 PROCEDURE — 99024 POSTOP FOLLOW-UP VISIT: CPT | Performed by: PHYSICIAN ASSISTANT

## 2025-07-10 RX ORDER — OXYCODONE HYDROCHLORIDE 5 MG/1
5 TABLET ORAL EVERY 4 HOURS PRN
Qty: 42 TABLET | Refills: 0 | Status: SHIPPED | OUTPATIENT
Start: 2025-07-10 | End: 2025-07-17

## 2025-07-10 NOTE — PROGRESS NOTES
on file   Social Connections: Not on file   Intimate Partner Violence: Not At Risk (6/20/2022)    Humiliation, Afraid, Rape, and Kick questionnaire     Fear of Current or Ex-Partner: No     Emotionally Abused: No     Physically Abused: No     Sexually Abused: No   Housing Stability: Low Risk  (6/23/2025)    Housing Stability Vital Sign     Unable to Pay for Housing in the Last Year: No     Number of Times Moved in the Last Year: 0     Homeless in the Last Year: No       Past Surgical History:   Procedure Laterality Date    ANTERIOR LUMBAR FUSION W/ FRA  2012    COLONOSCOPY  08/2024    KNEE ARTHROSCOPY Right 8/30/2024    RIGHT KNEE ARTHROSCOPIC PARTIAL MEDIAL AND LATERAL MENISCECTOMY performed by Kiko Segura MD at [facility] MAIN OR    KNEE SURGERY Left 05/01/2023    LAURI KNEE MANIPULATION performed by Larry Dong MD at [facility] MAIN OR    LUMBAR FUSION  1999 2005 last    TOTAL KNEE ARTHROPLASTY Left 01/16/2023    TOTAL KNEE ARTHROPLASTY Right 6/23/2025    Right total knee replacement. performed by Larry Dong MD at [facility] MAIN OR         Patient seen evaluated today for his right total knee replacement.  He is now 17 days status post surgery and progressing well.  He had no troubles the wound.  No injuries or falls.  No fevers or chills.  He has been receiving home physical therapy for complications.    Patient denies recent fevers, chills, chest pain, SOB, or injuries.   No recent systemic changes noted.  A 12-point review of systems is performed today.  Pertinent positives are noted.  All other systems reviewed and otherwise are negative.    Physical exam: General: Alert and oriented x3, nad.  well-developed, well nourished.  normal affect, AF.  NC/AT, EOMI, neck supple, trachea midline, no JVD present. Breathing is non-labored.  Examination of the right knee reveals skin intact.  The surgical wound is healed nicely.  Staples in place.  There is no erythema or ecchymosis noted.  Minimal swelling.  There

## 2025-07-18 ENCOUNTER — PATIENT MESSAGE (OUTPATIENT)
Age: 60
End: 2025-07-18

## 2025-07-24 ENCOUNTER — HOSPITAL ENCOUNTER (OUTPATIENT)
Facility: HOSPITAL | Age: 60
Setting detail: RECURRING SERIES
Discharge: HOME | End: 2025-07-27
Payer: OTHER GOVERNMENT

## 2025-07-24 PROCEDURE — 97161 PT EVAL LOW COMPLEX 20 MIN: CPT

## 2025-07-24 PROCEDURE — 97110 THERAPEUTIC EXERCISES: CPT

## 2025-07-24 NOTE — PROGRESS NOTES
Order signed   PT DAILY TREATMENT NOTE/KNEE EVAL       Patient Name: Davie Kay    Date: 2025    : 1965  Insurance: Payor: [employer]E OPTUM / Plan: VACCN OPTUM / Product Type: *No Product type* /      Patient  verified yes     Visit #   Current / Total 1 12   Time   In / Out 1:02 1:30   Pain   In / Out 2 3   Subjective Functional Status/Changes: See below.     Treatment Area: Status post right knee replacement [Z96.651]    SUBJECTIVE  Chief Complaint:  Pt notes he had a R total knee replacement on 25. Pt notes that since his surgery has been experiencing ongoing pain, swelling, and mobility deficits. Pt notes bending his knee is difficult and states he finds chairs challenging. Pt notes that prior to his surgery he ambulated with no AD. Pt notes prior to his appt today he had home physical therapy which ended last Friday.    Pain Level:  Current 2/10  Best: 2/10  Worst: 7-8/10  Aggravating Factors: walking, prolonged sitting, exercises such as heel slides  Alleviating Factors: ice       Home: Pt lives at home with his wife. Pt notes he has been having to modifying ADLs to his mobility deficits. Pt notes he cannot squat to get something off the floor.  Work: Pt is retired. Pt was a marine and an .  Hobbies: Pt enjoys reading.  Sleep: Pt sleeps with a pillow between his legs for comfort. Pt notes disturbed sleep.  Goal: Pt would like to improve his knee ROM and walk without an AD. Pt states \"I feel self conscious walking\".  PMH: No comorbidity, no allergies    OBJECTIVE:     Gait:  [] Normal    [x] Abnormal    [] Antalgic    [] NWB    Device: SPC    Describe: R hip drop, lacks R terminal knee extension    ROM / Strength  [] Unable to assess                  AROM             Strength (1-5)    Left Right Left Right   Hip Flexion - - 4+ 5    Extension - - - -    Abduction - - 4+ 5    Adduction - - - -   Knee Flexion 110 100* 4+ 5    Extension 0 Lacking 8 4* 5   Ankle Plantarflexion - - 5 5

## 2025-07-24 NOTE — PROGRESS NOTES
[facility] PHYSICAL THERAPY  [address] [place] [address] Phone: [phone] Fax   Plan of Care / Statement of Necessity for Physical Therapy Services     Patient Name: Davie Kay : 1965   Medical   Diagnosis: Status post right knee replacement Treatment Diagnosis: M25.561  RIGHT KNEE PAIN       Onset Date: 25 Payor :  Payor: VACCN OPTUM / Plan: VACCN OPTUM / Product Type: *No Product type* /    Referral Source: Jean Hughes PA-C Start of Care (SOC): 2025   Prior Hospitalization: See medical history   Prior Level of Function: Full function   Comorbidities: See below     Assessment / key information:    SUBJECTIVE  Chief Complaint:  Pt notes he had a R total knee replacement on 25. Pt notes that since his surgery has been experiencing ongoing pain, swelling, and mobility deficits. Pt notes bending his knee is difficult and states he finds chairs challenging. Pt notes that prior to his surgery he ambulated with no AD. Pt notes prior to his appt today he had home physical therapy which ended last Friday.     Pain Level:  Current 2/10  Best: 2/10  Worst: 7-8/10  Aggravating Factors: walking, prolonged sitting, exercises such as heel slides  Alleviating Factors: ice        Home: Pt lives at home with his wife. Pt notes he has been having to modifying ADLs to his mobility deficits. Pt notes he cannot squat to get something off the floor.  Work: Pt is retired. Pt was a marine and an .  Hobbies: Pt enjoys reading.  Sleep: Pt sleeps with a pillow between his legs for comfort. Pt notes disturbed sleep.  Goal: Pt would like to improve his knee ROM and walk without an AD. Pt states \"I feel self conscious walking\".  PMH: No comorbidity, no allergies     OBJECTIVE:      Gait:                [] Normal    [x] Abnormal    [] Antalgic    [] NWB    Device: SPC                          Describe: R hip drop, lacks R terminal knee

## 2025-07-29 ENCOUNTER — HOSPITAL ENCOUNTER (OUTPATIENT)
Facility: HOSPITAL | Age: 60
Setting detail: RECURRING SERIES
Discharge: HOME | End: 2025-08-01
Payer: OTHER GOVERNMENT

## 2025-07-29 PROCEDURE — 97110 THERAPEUTIC EXERCISES: CPT

## 2025-07-29 PROCEDURE — 97016 VASOPNEUMATIC DEVICE THERAPY: CPT

## 2025-07-29 PROCEDURE — 97140 MANUAL THERAPY 1/> REGIONS: CPT

## 2025-07-29 PROCEDURE — 97112 NEUROMUSCULAR REEDUCATION: CPT

## 2025-07-29 NOTE — PROGRESS NOTES
PHYSICAL / OCCUPATIONAL THERAPY - DAILY TREATMENT NOTE    Patient Name: Davie Kay    Date: 2025    : 1965  Insurance: Payor: VACCN OPTUM / Plan: VACCN OPTUM / Product Type: *No Product type* /      Patient  verified Yes     Visit #   Current / Total 2 10   Time   In / Out 11:40 am 12:32 pm   Pain   In / Out 2/10 010   Subjective Functional Status/Changes: \"It's heavy and swollen.\"     TREATMENT AREA =  Status post right knee replacement  Pain in right knee     OBJECTIVE  Modalities Rationale:     decrease edema, decrease inflammation, and decrease pain to improve patient's ability to progress to PLOF and address remaining functional goals.    10 min [x]  Vasopneumatic Device, press/temp: Low / 34 degrees  Position: supine with legs elevated by table approximately 40 degrees   If using vaso (only need to measure limb vaso being performed on)      pre-treatment girth: 46.3 cm      post-treatment girth: 45.9 cm      measured at (landmark location):  mid-patella with pants on   Skin assessment post-treatment:   Intact        Therapeutic Procedures:  Tx Min Procedure, Rationale, Specifics   23 [id number] Therapeutic Exercise (timed):  increase ROM, strength, coordination, balance, and proprioception to improve patient's ability to progress to PLOF and address remaining functional goals. (see flow sheet as applicable)     Details if applicable:  mobility, strengthening     9 [id number] Neuromuscular Re-Education (timed):  improve balance, coordination, kinesthetic sense, posture, core stability and proprioception to improve patient's ability to develop conscious control of individual muscles and awareness of position of extremities in order to progress to PLOF and address remaining functional goals. (see flow sheet as applicable)     Details if applicable:  quadriceps/hamstring re-education     10 79431 Manual Therapy (timed):  decrease pain, increase ROM, increase tissue extensibility, and decrease edema to

## 2025-07-31 ENCOUNTER — HOSPITAL ENCOUNTER (OUTPATIENT)
Facility: HOSPITAL | Age: 60
Setting detail: RECURRING SERIES
End: 2025-07-31
Payer: OTHER GOVERNMENT

## 2025-07-31 ENCOUNTER — OFFICE VISIT (OUTPATIENT)
Age: 60
End: 2025-07-31

## 2025-07-31 DIAGNOSIS — G89.18 POST-OP PAIN: ICD-10-CM

## 2025-07-31 DIAGNOSIS — Z96.651 STATUS POST RIGHT KNEE REPLACEMENT: Primary | ICD-10-CM

## 2025-07-31 PROCEDURE — 97140 MANUAL THERAPY 1/> REGIONS: CPT

## 2025-07-31 PROCEDURE — 99024 POSTOP FOLLOW-UP VISIT: CPT | Performed by: PHYSICIAN ASSISTANT

## 2025-07-31 PROCEDURE — 97110 THERAPEUTIC EXERCISES: CPT

## 2025-07-31 PROCEDURE — 97112 NEUROMUSCULAR REEDUCATION: CPT

## 2025-07-31 PROCEDURE — 97116 GAIT TRAINING THERAPY: CPT

## 2025-07-31 PROCEDURE — 97016 VASOPNEUMATIC DEVICE THERAPY: CPT

## 2025-07-31 RX ORDER — OXYCODONE HYDROCHLORIDE 5 MG/1
5 TABLET ORAL EVERY 4 HOURS PRN
Qty: 42 TABLET | Refills: 0 | Status: SHIPPED | OUTPATIENT
Start: 2025-07-31 | End: 2025-08-07

## 2025-07-31 NOTE — PROGRESS NOTES
Patient: Davie Kay                MRN: [id number]       SSN: [id number]  YOB: 1965        AGE: 60 y.o.        SEX: male  There is no height or weight on file to calculate BMI.    PCP: Sadi Burks MD  07/31/25      This office note has been dictated.      REVIEW OF SYSTEMS:  Constitutional: Negative for fever, chills, weight loss and malaise/fatigue.   HENT: Negative.    Eyes: Negative.    Respiratory: Negative.   Cardiovascular: Negative.   Gastrointestinal: No bowel incontinence or constipation.  Genitourinary: No bladder incontinence or saddle anesthesia.  Skin: Negative.   Neurological: Negative.    Endo/Heme/Allergies: Negative.    Psychiatric/Behavioral: Negative.  Musculoskeletal: As per HPI above.     Past Medical History:   Diagnosis Date    Chronic back pain     Diabetes (HCC)     Ozempic    Hypertension     Hypothyroid     Takes levothyroxine    JORDON on CPAP 2006         Current Outpatient Medications:     oxyCODONE (ROXICODONE) 5 MG immediate release tablet, Take 1 tablet by mouth every 4 hours as needed for Pain for up to 7 days. Supervising provider- Dr. Larry Dong- QUITA: [id number] Max Daily Amount: 30 mg, Disp: 42 tablet, Rfl: 0    aspirin (ASPIRIN 81) 81 MG EC tablet, Take 1 tablet by mouth in the morning and at bedtime, Disp: 60 tablet, Rfl: 3    celecoxib (CELEBREX) 200 MG capsule, Take 1 capsule by mouth daily, Disp: 30 capsule, Rfl: 2    docusate sodium (COLACE) 100 MG capsule, Take 1 capsule by mouth 2 times daily, Disp: 60 capsule, Rfl: 1    ondansetron (ZOFRAN) 4 MG tablet, Take 1 tablet by mouth every 8 hours as needed for Nausea or Vomiting, Disp: 30 tablet, Rfl: 2    acetaminophen (TYLENOL) 500 MG tablet, Take 2 tablets by mouth every 8 hours as needed for Pain, Disp: 90 tablet, Rfl: 1    vitamin D (CHOLECALCIFEROL) 25 MCG (1000 UT) TABS tablet, Take 1 tablet by mouth daily, Disp: , Rfl:     empagliflozin (JARDIANCE) 25 MG tablet, Take 0.5 tablets by mouth

## 2025-07-31 NOTE — PROGRESS NOTES
PHYSICAL / OCCUPATIONAL THERAPY - DAILY TREATMENT NOTE (updated )    Patient Name: Davie Kay    Date: 2025    : 1965  Insurance: Payor: VACCN OPTUM / Plan: VACCN OPTUM / Product Type: *No Product type* /      Patient  verified YES    Visit #   Current / Total 3 10   Time   In / Out [id number]   Pain   In / Out 3 3   Subjective Functional Status/Changes: No adverse effects following last session.      TREATMENT AREA =  Status post right knee replacement  Pain in right knee    OBJECTIVE    Modalities Rationale:     decrease edema, decrease inflammation, and decrease pain to improve patient's ability to progress to PLOF and address remaining functional goals.     10 min [x]  Vasopneumatic Device, press/temp: Low / 34 degrees  Position: supine with legs elevated    If using vaso (only need to measure limb vaso being performed on)      pre-treatment girth: 46 cm      post-treatment girth: 45.5 cm      measured at (landmark location):  mid-patella with pants on   Skin assessment post-treatment:   Intact      Therapeutic Procedures:  Tx Min Billable or 1:1 Min (if diff from Tx Min) Procedure, Rationale, Specifics   21  [id number] Therapeutic Exercise (timed):  increase ROM, strength, coordination, balance, and proprioception to improve patient's ability to progress to PLOF and address remaining functional goals. (see flow sheet as applicable)     Details if applicable:    See flowsheet     8  78887 Neuromuscular Re-Education (timed):  improve balance, coordination, kinesthetic sense, posture, core stability and proprioception to improve patient's ability to develop conscious control of individual muscles and awareness of position of extremities in order to progress to PLOF and address remaining functional goals. (see flow sheet as applicable)     Details if applicable:  quad re-education  See flowsheet   8  23883 Gait Training (timed):    To improve safety and dynamic movement with household/community

## 2025-08-04 ENCOUNTER — HOSPITAL ENCOUNTER (OUTPATIENT)
Facility: HOSPITAL | Age: 60
Setting detail: RECURRING SERIES
Discharge: HOME | End: 2025-08-07
Payer: OTHER GOVERNMENT

## 2025-08-04 PROCEDURE — 97140 MANUAL THERAPY 1/> REGIONS: CPT

## 2025-08-04 PROCEDURE — 97110 THERAPEUTIC EXERCISES: CPT

## 2025-08-04 PROCEDURE — 97116 GAIT TRAINING THERAPY: CPT

## 2025-08-04 PROCEDURE — 97112 NEUROMUSCULAR REEDUCATION: CPT

## 2025-08-04 PROCEDURE — 97016 VASOPNEUMATIC DEVICE THERAPY: CPT

## 2025-08-08 ENCOUNTER — HOSPITAL ENCOUNTER (OUTPATIENT)
Facility: HOSPITAL | Age: 60
Setting detail: RECURRING SERIES
Discharge: HOME | End: 2025-08-11
Payer: OTHER GOVERNMENT

## 2025-08-08 PROCEDURE — 97016 VASOPNEUMATIC DEVICE THERAPY: CPT

## 2025-08-08 PROCEDURE — 97110 THERAPEUTIC EXERCISES: CPT

## 2025-08-08 PROCEDURE — 97530 THERAPEUTIC ACTIVITIES: CPT

## 2025-08-08 PROCEDURE — 97140 MANUAL THERAPY 1/> REGIONS: CPT

## 2025-08-08 PROCEDURE — 97112 NEUROMUSCULAR REEDUCATION: CPT

## 2025-08-14 ENCOUNTER — HOSPITAL ENCOUNTER (OUTPATIENT)
Facility: HOSPITAL | Age: 60
Setting detail: RECURRING SERIES
Discharge: HOME | End: 2025-08-17
Payer: OTHER GOVERNMENT

## 2025-08-14 PROCEDURE — 97110 THERAPEUTIC EXERCISES: CPT

## 2025-08-14 PROCEDURE — 97530 THERAPEUTIC ACTIVITIES: CPT

## 2025-08-14 PROCEDURE — 97140 MANUAL THERAPY 1/> REGIONS: CPT

## 2025-08-14 PROCEDURE — 97112 NEUROMUSCULAR REEDUCATION: CPT

## 2025-08-14 PROCEDURE — 97016 VASOPNEUMATIC DEVICE THERAPY: CPT

## 2025-08-15 ENCOUNTER — HOSPITAL ENCOUNTER (OUTPATIENT)
Facility: HOSPITAL | Age: 60
Setting detail: RECURRING SERIES
Discharge: HOME | End: 2025-08-18
Payer: OTHER GOVERNMENT

## 2025-08-15 PROCEDURE — 97112 NEUROMUSCULAR REEDUCATION: CPT

## 2025-08-15 PROCEDURE — 97110 THERAPEUTIC EXERCISES: CPT

## 2025-08-15 PROCEDURE — 97140 MANUAL THERAPY 1/> REGIONS: CPT

## 2025-08-15 PROCEDURE — 97016 VASOPNEUMATIC DEVICE THERAPY: CPT

## 2025-08-15 PROCEDURE — 97530 THERAPEUTIC ACTIVITIES: CPT

## 2025-08-19 ENCOUNTER — HOSPITAL ENCOUNTER (OUTPATIENT)
Facility: HOSPITAL | Age: 60
Setting detail: RECURRING SERIES
Discharge: HOME | End: 2025-08-22
Payer: OTHER GOVERNMENT

## 2025-08-19 PROCEDURE — 97140 MANUAL THERAPY 1/> REGIONS: CPT

## 2025-08-19 PROCEDURE — 97016 VASOPNEUMATIC DEVICE THERAPY: CPT

## 2025-08-19 PROCEDURE — 97112 NEUROMUSCULAR REEDUCATION: CPT

## 2025-08-19 PROCEDURE — 97530 THERAPEUTIC ACTIVITIES: CPT

## 2025-08-19 PROCEDURE — 97110 THERAPEUTIC EXERCISES: CPT

## 2025-08-21 ENCOUNTER — HOSPITAL ENCOUNTER (OUTPATIENT)
Facility: HOSPITAL | Age: 60
Setting detail: RECURRING SERIES
Discharge: HOME | End: 2025-08-24
Payer: OTHER GOVERNMENT

## 2025-08-21 ENCOUNTER — OFFICE VISIT (OUTPATIENT)
Age: 60
End: 2025-08-21

## 2025-08-21 DIAGNOSIS — G89.18 POST-OP PAIN: ICD-10-CM

## 2025-08-21 DIAGNOSIS — Z96.651 STATUS POST RIGHT KNEE REPLACEMENT: Primary | ICD-10-CM

## 2025-08-21 PROCEDURE — 97530 THERAPEUTIC ACTIVITIES: CPT

## 2025-08-21 PROCEDURE — 97110 THERAPEUTIC EXERCISES: CPT

## 2025-08-21 PROCEDURE — 97140 MANUAL THERAPY 1/> REGIONS: CPT

## 2025-08-21 PROCEDURE — 97112 NEUROMUSCULAR REEDUCATION: CPT

## 2025-08-21 RX ORDER — MELOXICAM 15 MG/1
15 TABLET ORAL DAILY
Qty: 30 TABLET | Refills: 3 | Status: SHIPPED | OUTPATIENT
Start: 2025-08-21

## 2025-08-21 RX ORDER — OXYCODONE HYDROCHLORIDE 5 MG/1
5 TABLET ORAL EVERY 4 HOURS PRN
Qty: 42 TABLET | Refills: 0 | Status: SHIPPED | OUTPATIENT
Start: 2025-08-21 | End: 2025-08-28

## 2025-08-27 ENCOUNTER — HOSPITAL ENCOUNTER (OUTPATIENT)
Facility: HOSPITAL | Age: 60
Setting detail: RECURRING SERIES
Discharge: HOME | End: 2025-08-30
Payer: OTHER GOVERNMENT

## 2025-08-27 PROCEDURE — 97110 THERAPEUTIC EXERCISES: CPT

## 2025-08-27 PROCEDURE — 97112 NEUROMUSCULAR REEDUCATION: CPT

## 2025-08-27 PROCEDURE — 97530 THERAPEUTIC ACTIVITIES: CPT

## 2025-09-02 ENCOUNTER — HOSPITAL ENCOUNTER (OUTPATIENT)
Facility: HOSPITAL | Age: 60
Setting detail: RECURRING SERIES
Discharge: HOME | End: 2025-09-05
Payer: OTHER GOVERNMENT

## 2025-09-02 PROCEDURE — 97110 THERAPEUTIC EXERCISES: CPT

## 2025-09-02 PROCEDURE — 97530 THERAPEUTIC ACTIVITIES: CPT

## 2025-09-02 PROCEDURE — 97112 NEUROMUSCULAR REEDUCATION: CPT

## 2025-09-04 ENCOUNTER — OFFICE VISIT (OUTPATIENT)
Age: 60
End: 2025-09-04

## 2025-09-04 DIAGNOSIS — Z96.651 STATUS POST RIGHT KNEE REPLACEMENT: Primary | ICD-10-CM

## 2025-09-04 PROCEDURE — 99024 POSTOP FOLLOW-UP VISIT: CPT | Performed by: PHYSICIAN ASSISTANT

## (undated) DEVICE — GLOVE SURG SZ 8 CRM LTX FREE POLYISOPRENE POLYMER BEAD ANTI

## (undated) DEVICE — SYRINGE MED 30ML STD CLR PLAS LUERLOCK TIP N CTRL DISP

## (undated) DEVICE — TAPE,CLOTH/SILK,CURAD,3"X10YD,LF,40/CS: Brand: CURAD

## (undated) DEVICE — APPLICATOR MEDICATED 26 CC SOLUTION HI LT ORNG CHLORAPREP

## (undated) DEVICE — ELECTRODE PT RET AD L9FT HI MOIST COND ADH HYDRGEL CORDED

## (undated) DEVICE — SOLUTION IRRIG 3000ML 0.9% SOD CHL FLX CONT 0797208] ICU MEDICAL INC]

## (undated) DEVICE — BOWL AND CEMENT CARTRIDGE WITH BREAKAWAY FEMORAL NOZZLE: Brand: ACM

## (undated) DEVICE — HANDPIECE SET WITH HIGH FLOW TIP AND SUCTION TUBE: Brand: INTERPULSE

## (undated) DEVICE — GOWN ,SIRUS ,NONREINFORCED 4XL: Brand: MEDLINE

## (undated) DEVICE — SPONGE LAP W18XL18IN WHT COT 4 PLY FLD STRUNG RADPQ DISP ST 2 PER PACK

## (undated) DEVICE — BLUNTFILL: Brand: MONOJECT

## (undated) DEVICE — BLADE,STAINLESS-STEEL,15,STRL,DISPOSABLE: Brand: MEDLINE

## (undated) DEVICE — [TOMCAT CUTTER, ARTHROSCOPIC SHAVER BLADE,  DO NOT RESTERILIZE,  DO NOT USE IF PACKAGE IS DAMAGED,  KEEP DRY,  KEEP AWAY FROM SUNLIGHT]: Brand: FORMULA

## (undated) DEVICE — 4-PORT MANIFOLD: Brand: NEPTUNE 2

## (undated) DEVICE — SYRINGE MED 3ML NDL 22GA L1 1/2IN REG BVL SFGLDE

## (undated) DEVICE — SUTURE VICRYL SZ 0 L36IN ABSRB UD L36MM CT-1 1/2 CIR J946H

## (undated) DEVICE — DISPOSABLE MULTI BAG ADAPTERS Y                                    TUBING, STERILE, 2 TO A SET 6 SETS                                    PER BOX

## (undated) DEVICE — NEEDLE SPNL L3.5IN PNK HUB S STL REG WALL FIT STYL W/ QNCKE

## (undated) DEVICE — SUTURE ABSORBABLE ANTIBACT 1-0 CT-1 24 IN STRATAFIX PDS + SXPP1A443

## (undated) DEVICE — SKIN MARKER,REGULAR TIP WITH RULER AND LABELS: Brand: DEVON

## (undated) DEVICE — SUTURE VICRYL SZ 0 L27IN ABSRB UD L36MM CT-1 1/2 CIR J260H

## (undated) DEVICE — GENESIS TROCHLEAR PIN 1/8 X 3: Brand: GENESIS

## (undated) DEVICE — INTENDED FOR TISSUE SEPARATION, AND OTHER PROCEDURES THAT REQUIRE A SHARP SURGICAL BLADE TO PUNCTURE OR CUT.: Brand: BARD-PARKER ® STAINLESS STEEL BLADES

## (undated) DEVICE — SOLUTION IRRIG 1000ML 0.9% SOD CHL USP POUR PLAS BTL

## (undated) DEVICE — NEEDLE SPNL 22GA L3.5IN BLK HUB S STL REG WALL FIT STYL W/

## (undated) DEVICE — GLOVE SURG SZ 65 THK91MIL LTX FREE SYN POLYISOPRENE

## (undated) DEVICE — ZIMMER® STERILE DISPOSABLE TOURNIQUET CUFF WITH PLC, DUAL PORT, SINGLE BLADDER, 34 IN. (86 CM)

## (undated) DEVICE — DRESSING HYDROFIBER AQUACEL AG ADVANTAGE 3.5X14 IN

## (undated) DEVICE — STOCKING COMPR M L16-18IN LNG 19MMHG ANK 8-9IN CALF 12-15IN

## (undated) DEVICE — KNEE ARTHROSCOPY III-LF: Brand: MEDLINE INDUSTRIES, INC.

## (undated) DEVICE — GOWN,REINFORCED,POLY,AURORA,XXLARGE,STR: Brand: MEDLINE

## (undated) DEVICE — PREMIUM DRY TRAY LF: Brand: MEDLINE INDUSTRIES, INC.

## (undated) DEVICE — GENESIS PIN AND DRILL SET: Brand: GENESIS

## (undated) DEVICE — BIPOLAR SEALER 23-112-1 AQM 6.0: Brand: AQUAMANTYS ®

## (undated) DEVICE — GLOVE SURG SZ 85 CRM LTX FREE POLYISOPRENE POLYMER BEAD ANTI

## (undated) DEVICE — UNDERGLOVE SURG SZ 7.5 BLU LTX FREE SYN POLYISOPRENE

## (undated) DEVICE — BLADE,STAINLESS-STEEL,10,STRL,DISPOSABLE: Brand: MEDLINE

## (undated) DEVICE — BNDG ELAS HK LOOP 6X5YD NS -- MATRIX

## (undated) DEVICE — SOLUTION 1000ML NRML NACL

## (undated) DEVICE — GLOVE SURG SZ 85 L12IN FNGR THK79MIL GRN LTX FREE

## (undated) DEVICE — BLANKET WRM AD W50XL85.8IN PACU FULL BODY FORC AIR

## (undated) DEVICE — ZIMMER® STERILE DISPOSABLE TOURNIQUET CUFF WITH PROTECTIVE SLEEVE AND PLC, DUAL PORT, SINGLE BLADDER, 34 IN. (86 CM)

## (undated) DEVICE — STRYKER PERFORMANCE SERIES SAGITTAL BLADE: Brand: STRYKER PERFORMANCE SERIES

## (undated) DEVICE — EVACUATOR SMOKE L 10 FT SMOKE MANAGEMENT EXTENDED EDGE ELECTRODE STERILE DISP

## (undated) DEVICE — HOOD WITH PEEL AWAY FACE SHIELD: Brand: T7PLUS

## (undated) DEVICE — SUTURE MONOCRYL SZ 4-0 L18IN ABSRB UD L19MM PS-2 3/8 CIR PRIM Y496G

## (undated) DEVICE — PACK SURG BSHR TOT KNEE LF

## (undated) DEVICE — BNDG,ELSTC,MATRIX,STRL,6"X5YD,LF,HOOK&LP: Brand: MEDLINE

## (undated) DEVICE — SUTURE VCRL + SZ 2 L27IN ABSRB UD TP-1 L65MM 1/2 CIR TAPR VCP849G

## (undated) DEVICE — Device: Brand: JELCO

## (undated) DEVICE — BLADE REPROC SAW RECIP DBL SIDED FIXED PT 3.56MM 70X12.5X0.64MM

## (undated) DEVICE — SOLUTION IV 1000ML 0.9% SOD CHL

## (undated) DEVICE — INTENDED FOR TISSUE SEPARATION, AND OTHER PROCEDURES THAT REQUIRE A SHARP SURGICAL BLADE TO PUNCTURE OR CUT.: Brand: BARD-PARKER ® CARBON RIB-BACK BLADES

## (undated) DEVICE — GLOVE SURG SZ 65 L12IN FNGR THK79MIL GRN LTX FREE

## (undated) DEVICE — POSITIONER ARTHSCP KNEE HLDR WHT W/O CVR

## (undated) DEVICE — SUTURE MONOCRYL SZ 2-0 L36IN ABSRB UD L36MM CT-1 1/2 CIR Y945H

## (undated) DEVICE — INTENDED FOR TISSUE SEPARATION, AND OTHER PROCEDURES THAT REQUIRE A SHARP SURGICAL BLADE TO PUNCTURE OR CUT.: Brand: BARD-PARKER SAFETY BLADES SIZE 11, STERILE

## (undated) DEVICE — 3M™ STERI-DRAPE™ INSTRUMENT POUCH 1018: Brand: STERI-DRAPE™

## (undated) DEVICE — APPLICATOR BNDG 1MM ADH PREMIERPRO EXOFIN

## (undated) DEVICE — CLEAN UP KIT: Brand: MEDLINE INDUSTRIES, INC.

## (undated) DEVICE — Device

## (undated) DEVICE — INFLOW CASSETTE TUBING, DO NOT USE IF PACKAGE IS DAMAGED: Brand: CROSSFLOW

## (undated) DEVICE — BLADE ES L2.75IN ELASTOMERIC COAT DURABLE BEND UPTO 90DEG

## (undated) DEVICE — PACK SURG CUST BSHR TOT KNEE LF MMC

## (undated) DEVICE — DECANTER BAG 9": Brand: MEDLINE INDUSTRIES, INC.

## (undated) DEVICE — SUTURE VCRL + SZ 0 L27IN ABSRB UD CT-1 L36MM 1/2 CIR TAPR VCP260H

## (undated) DEVICE — KIT CLN UP BON SECOURS MARYV

## (undated) DEVICE — WEREWOLF FASTSEAL 6.0 HEMOSTASIS WAND: Brand: FASTSEAL 6.0 HEMOSTASIS WAND

## (undated) DEVICE — 450 ML BOTTLE OF 0.05% CHLORHEXIDINE GLUCONATE IN 99.95% STERILE WATER FOR IRRIGATION, USP AND APPLICATOR.: Brand: IRRISEPT ANTIMICROBIAL WOUND LAVAGE

## (undated) DEVICE — GLOVE SURG SZ 7 CRM LTX FREE POLYISOPRENE POLYMER BEAD ANTI

## (undated) DEVICE — SOFT SILICONE HYDROCELLULAR SACRUM DRESSING WITH LOCK AWAY LAYER: Brand: ALLEVYN LIFE SACRUM (LARGE) PACK OF 10

## (undated) DEVICE — SYRINGE, LUER LOCK, 3ML: Brand: MEDLINE

## (undated) DEVICE — SUTURE VICRYL SZ 2 L27IN ABSRB VLT L65MM TP-1 1/2 CIR J649G

## (undated) DEVICE — SUTURE MCRYL + SZ 2-0 L36IN ABSRB UD CT-1 L36MM 1/2 CIR MCP945H

## (undated) DEVICE — HYPODERMIC SAFETY NEEDLE: Brand: MAGELLAN